# Patient Record
Sex: FEMALE | Race: WHITE | NOT HISPANIC OR LATINO
[De-identification: names, ages, dates, MRNs, and addresses within clinical notes are randomized per-mention and may not be internally consistent; named-entity substitution may affect disease eponyms.]

---

## 2017-08-09 ENCOUNTER — RX RENEWAL (OUTPATIENT)
Age: 72
End: 2017-08-09

## 2017-09-19 ENCOUNTER — RX RENEWAL (OUTPATIENT)
Age: 72
End: 2017-09-19

## 2017-11-01 ENCOUNTER — RESULT CHARGE (OUTPATIENT)
Age: 72
End: 2017-11-01

## 2017-11-02 ENCOUNTER — APPOINTMENT (OUTPATIENT)
Dept: HEART AND VASCULAR | Facility: CLINIC | Age: 72
End: 2017-11-02
Payer: MEDICARE

## 2017-11-02 VITALS
HEIGHT: 66 IN | DIASTOLIC BLOOD PRESSURE: 80 MMHG | WEIGHT: 142 LBS | OXYGEN SATURATION: 97 % | BODY MASS INDEX: 22.82 KG/M2 | HEART RATE: 87 BPM | TEMPERATURE: 98.7 F | SYSTOLIC BLOOD PRESSURE: 140 MMHG

## 2017-11-02 DIAGNOSIS — Z86.018 PERSONAL HISTORY OF OTHER BENIGN NEOPLASM: ICD-10-CM

## 2017-11-02 PROCEDURE — 99214 OFFICE O/P EST MOD 30 MIN: CPT | Mod: 25

## 2017-11-02 PROCEDURE — 93000 ELECTROCARDIOGRAM COMPLETE: CPT

## 2017-11-29 ENCOUNTER — RESULT REVIEW (OUTPATIENT)
Age: 72
End: 2017-11-29

## 2017-12-18 ENCOUNTER — APPOINTMENT (OUTPATIENT)
Dept: HEART AND VASCULAR | Facility: CLINIC | Age: 72
End: 2017-12-18
Payer: MEDICARE

## 2017-12-18 VITALS
BODY MASS INDEX: 23.14 KG/M2 | HEART RATE: 69 BPM | WEIGHT: 143.98 LBS | OXYGEN SATURATION: 96 % | DIASTOLIC BLOOD PRESSURE: 80 MMHG | HEIGHT: 66 IN | TEMPERATURE: 99.1 F | SYSTOLIC BLOOD PRESSURE: 132 MMHG

## 2017-12-18 PROCEDURE — 93000 ELECTROCARDIOGRAM COMPLETE: CPT

## 2017-12-18 PROCEDURE — 99214 OFFICE O/P EST MOD 30 MIN: CPT | Mod: 25

## 2018-02-06 ENCOUNTER — APPOINTMENT (OUTPATIENT)
Dept: HEART AND VASCULAR | Facility: CLINIC | Age: 73
End: 2018-02-06

## 2018-03-15 ENCOUNTER — OTHER (OUTPATIENT)
Age: 73
End: 2018-03-15

## 2018-03-21 ENCOUNTER — APPOINTMENT (OUTPATIENT)
Dept: HEART AND VASCULAR | Facility: CLINIC | Age: 73
End: 2018-03-21

## 2018-05-01 ENCOUNTER — APPOINTMENT (OUTPATIENT)
Dept: HEART AND VASCULAR | Facility: CLINIC | Age: 73
End: 2018-05-01
Payer: MEDICARE

## 2018-05-01 VITALS
HEART RATE: 56 BPM | BODY MASS INDEX: 22.66 KG/M2 | WEIGHT: 140.99 LBS | DIASTOLIC BLOOD PRESSURE: 81 MMHG | OXYGEN SATURATION: 97 % | SYSTOLIC BLOOD PRESSURE: 144 MMHG | TEMPERATURE: 97.5 F | HEIGHT: 66 IN

## 2018-05-01 DIAGNOSIS — R06.02 SHORTNESS OF BREATH: ICD-10-CM

## 2018-05-01 PROCEDURE — 99214 OFFICE O/P EST MOD 30 MIN: CPT | Mod: 25

## 2018-05-01 PROCEDURE — 93000 ELECTROCARDIOGRAM COMPLETE: CPT

## 2018-05-17 ENCOUNTER — OTHER (OUTPATIENT)
Age: 73
End: 2018-05-17

## 2018-05-22 ENCOUNTER — APPOINTMENT (OUTPATIENT)
Dept: HEART AND VASCULAR | Facility: CLINIC | Age: 73
End: 2018-05-22

## 2018-05-30 ENCOUNTER — LABORATORY RESULT (OUTPATIENT)
Age: 73
End: 2018-05-30

## 2018-05-30 ENCOUNTER — APPOINTMENT (OUTPATIENT)
Dept: HEART AND VASCULAR | Facility: CLINIC | Age: 73
End: 2018-05-30
Payer: MEDICARE

## 2018-05-30 PROCEDURE — 36415 COLL VENOUS BLD VENIPUNCTURE: CPT

## 2018-06-05 ENCOUNTER — RX RENEWAL (OUTPATIENT)
Age: 73
End: 2018-06-05

## 2018-06-25 ENCOUNTER — INPATIENT (INPATIENT)
Facility: HOSPITAL | Age: 73
LOS: 3 days | Discharge: HOME | DRG: 392 | End: 2018-06-29
Payer: MEDICARE

## 2018-06-25 VITALS
TEMPERATURE: 98 F | OXYGEN SATURATION: 95 % | HEART RATE: 89 BPM | HEIGHT: 66 IN | SYSTOLIC BLOOD PRESSURE: 150 MMHG | DIASTOLIC BLOOD PRESSURE: 91 MMHG | RESPIRATION RATE: 16 BRPM | WEIGHT: 134.92 LBS

## 2018-06-25 DIAGNOSIS — Z86.011 PERSONAL HISTORY OF BENIGN NEOPLASM OF THE BRAIN: Chronic | ICD-10-CM

## 2018-06-25 DIAGNOSIS — Z90.710 ACQUIRED ABSENCE OF BOTH CERVIX AND UTERUS: Chronic | ICD-10-CM

## 2018-06-25 LAB
ALBUMIN SERPL ELPH-MCNC: 4.5 G/DL — SIGNIFICANT CHANGE UP (ref 3.3–5)
ALP SERPL-CCNC: 101 U/L — SIGNIFICANT CHANGE UP (ref 40–120)
ALT FLD-CCNC: 20 U/L — SIGNIFICANT CHANGE UP (ref 10–45)
ANION GAP SERPL CALC-SCNC: 19 MMOL/L — HIGH (ref 5–17)
AST SERPL-CCNC: 26 U/L — SIGNIFICANT CHANGE UP (ref 10–40)
BASOPHILS NFR BLD AUTO: 0.1 % — SIGNIFICANT CHANGE UP (ref 0–2)
BILIRUB SERPL-MCNC: 1 MG/DL — SIGNIFICANT CHANGE UP (ref 0.2–1.2)
BUN SERPL-MCNC: 21 MG/DL — SIGNIFICANT CHANGE UP (ref 7–23)
CALCIUM SERPL-MCNC: 9.1 MG/DL — SIGNIFICANT CHANGE UP (ref 8.4–10.5)
CHLORIDE SERPL-SCNC: 99 MMOL/L — SIGNIFICANT CHANGE UP (ref 96–108)
CO2 SERPL-SCNC: 23 MMOL/L — SIGNIFICANT CHANGE UP (ref 22–31)
CREAT SERPL-MCNC: 0.71 MG/DL — SIGNIFICANT CHANGE UP (ref 0.5–1.3)
EOSINOPHIL NFR BLD AUTO: 0 % — SIGNIFICANT CHANGE UP (ref 0–6)
GLUCOSE SERPL-MCNC: 136 MG/DL — HIGH (ref 70–99)
HCT VFR BLD CALC: 43.2 % — SIGNIFICANT CHANGE UP (ref 34.5–45)
HGB BLD-MCNC: 15.1 G/DL — SIGNIFICANT CHANGE UP (ref 11.5–15.5)
LACTATE SERPL-SCNC: 2 MMOL/L — SIGNIFICANT CHANGE UP (ref 0.5–2)
LACTATE SERPL-SCNC: 3.7 MMOL/L — HIGH (ref 0.5–2)
LIDOCAIN IGE QN: 11 U/L — SIGNIFICANT CHANGE UP (ref 7–60)
LYMPHOCYTES # BLD AUTO: 6.7 % — LOW (ref 13–44)
MCHC RBC-ENTMCNC: 30.8 PG — SIGNIFICANT CHANGE UP (ref 27–34)
MCHC RBC-ENTMCNC: 35 G/DL — SIGNIFICANT CHANGE UP (ref 32–36)
MCV RBC AUTO: 88.2 FL — SIGNIFICANT CHANGE UP (ref 80–100)
MONOCYTES NFR BLD AUTO: 6.1 % — SIGNIFICANT CHANGE UP (ref 2–14)
NEUTROPHILS NFR BLD AUTO: 87.1 % — HIGH (ref 43–77)
PLATELET # BLD AUTO: 220 K/UL — SIGNIFICANT CHANGE UP (ref 150–400)
POTASSIUM SERPL-MCNC: 4.2 MMOL/L — SIGNIFICANT CHANGE UP (ref 3.5–5.3)
POTASSIUM SERPL-SCNC: 4.2 MMOL/L — SIGNIFICANT CHANGE UP (ref 3.5–5.3)
PROT SERPL-MCNC: 7.3 G/DL — SIGNIFICANT CHANGE UP (ref 6–8.3)
RBC # BLD: 4.9 M/UL — SIGNIFICANT CHANGE UP (ref 3.8–5.2)
RBC # FLD: 14.2 % — SIGNIFICANT CHANGE UP (ref 10.3–16.9)
SODIUM SERPL-SCNC: 141 MMOL/L — SIGNIFICANT CHANGE UP (ref 135–145)
WBC # BLD: 13.2 K/UL — HIGH (ref 3.8–10.5)
WBC # FLD AUTO: 13.2 K/UL — HIGH (ref 3.8–10.5)

## 2018-06-25 PROCEDURE — 99285 EMERGENCY DEPT VISIT HI MDM: CPT | Mod: 25

## 2018-06-25 PROCEDURE — 93010 ELECTROCARDIOGRAM REPORT: CPT

## 2018-06-25 PROCEDURE — 74177 CT ABD & PELVIS W/CONTRAST: CPT | Mod: 26

## 2018-06-25 PROCEDURE — 99223 1ST HOSP IP/OBS HIGH 75: CPT | Mod: GC

## 2018-06-25 RX ORDER — FAMOTIDINE 10 MG/ML
20 INJECTION INTRAVENOUS ONCE
Qty: 0 | Refills: 0 | Status: COMPLETED | OUTPATIENT
Start: 2018-06-25 | End: 2018-06-25

## 2018-06-25 RX ORDER — SODIUM CHLORIDE 9 MG/ML
1000 INJECTION INTRAMUSCULAR; INTRAVENOUS; SUBCUTANEOUS
Qty: 0 | Refills: 0 | Status: DISCONTINUED | OUTPATIENT
Start: 2018-06-25 | End: 2018-06-27

## 2018-06-25 RX ORDER — MORPHINE SULFATE 50 MG/1
4 CAPSULE, EXTENDED RELEASE ORAL ONCE
Qty: 0 | Refills: 0 | Status: DISCONTINUED | OUTPATIENT
Start: 2018-06-25 | End: 2018-06-25

## 2018-06-25 RX ORDER — PIPERACILLIN AND TAZOBACTAM 4; .5 G/20ML; G/20ML
3.38 INJECTION, POWDER, LYOPHILIZED, FOR SOLUTION INTRAVENOUS EVERY 6 HOURS
Qty: 0 | Refills: 0 | Status: DISCONTINUED | OUTPATIENT
Start: 2018-06-26 | End: 2018-06-26

## 2018-06-25 RX ORDER — SODIUM CHLORIDE 9 MG/ML
1000 INJECTION INTRAMUSCULAR; INTRAVENOUS; SUBCUTANEOUS ONCE
Qty: 0 | Refills: 0 | Status: COMPLETED | OUTPATIENT
Start: 2018-06-25 | End: 2018-06-25

## 2018-06-25 RX ORDER — HEPARIN SODIUM 5000 [USP'U]/ML
5000 INJECTION INTRAVENOUS; SUBCUTANEOUS EVERY 8 HOURS
Qty: 0 | Refills: 0 | Status: DISCONTINUED | OUTPATIENT
Start: 2018-06-25 | End: 2018-06-29

## 2018-06-25 RX ORDER — CIPROFLOXACIN LACTATE 400MG/40ML
400 VIAL (ML) INTRAVENOUS EVERY 12 HOURS
Qty: 0 | Refills: 0 | Status: DISCONTINUED | OUTPATIENT
Start: 2018-06-25 | End: 2018-06-25

## 2018-06-25 RX ORDER — LEVETIRACETAM 250 MG/1
500 TABLET, FILM COATED ORAL AT BEDTIME
Qty: 0 | Refills: 0 | Status: DISCONTINUED | OUTPATIENT
Start: 2018-06-25 | End: 2018-06-29

## 2018-06-25 RX ORDER — IOHEXOL 300 MG/ML
50 INJECTION, SOLUTION INTRAVENOUS ONCE
Qty: 0 | Refills: 0 | Status: COMPLETED | OUTPATIENT
Start: 2018-06-25 | End: 2018-06-25

## 2018-06-25 RX ORDER — ONDANSETRON 8 MG/1
4 TABLET, FILM COATED ORAL ONCE
Qty: 0 | Refills: 0 | Status: COMPLETED | OUTPATIENT
Start: 2018-06-25 | End: 2018-06-25

## 2018-06-25 RX ORDER — ATORVASTATIN CALCIUM 80 MG/1
0 TABLET, FILM COATED ORAL
Qty: 0 | Refills: 0 | COMMUNITY

## 2018-06-25 RX ORDER — METRONIDAZOLE 500 MG
500 TABLET ORAL EVERY 8 HOURS
Qty: 0 | Refills: 0 | Status: DISCONTINUED | OUTPATIENT
Start: 2018-06-25 | End: 2018-06-25

## 2018-06-25 RX ORDER — ATORVASTATIN CALCIUM 80 MG/1
40 TABLET, FILM COATED ORAL AT BEDTIME
Qty: 0 | Refills: 0 | Status: DISCONTINUED | OUTPATIENT
Start: 2018-06-25 | End: 2018-06-29

## 2018-06-25 RX ORDER — MORPHINE SULFATE 50 MG/1
2 CAPSULE, EXTENDED RELEASE ORAL EVERY 4 HOURS
Qty: 0 | Refills: 0 | Status: DISCONTINUED | OUTPATIENT
Start: 2018-06-25 | End: 2018-06-29

## 2018-06-25 RX ORDER — PIPERACILLIN AND TAZOBACTAM 4; .5 G/20ML; G/20ML
3.38 INJECTION, POWDER, LYOPHILIZED, FOR SOLUTION INTRAVENOUS ONCE
Qty: 0 | Refills: 0 | Status: COMPLETED | OUTPATIENT
Start: 2018-06-25 | End: 2018-06-25

## 2018-06-25 RX ORDER — ASPIRIN/CALCIUM CARB/MAGNESIUM 324 MG
81 TABLET ORAL DAILY
Qty: 0 | Refills: 0 | Status: DISCONTINUED | OUTPATIENT
Start: 2018-06-25 | End: 2018-06-29

## 2018-06-25 RX ADMIN — LEVETIRACETAM 500 MILLIGRAM(S): 250 TABLET, FILM COATED ORAL at 22:50

## 2018-06-25 RX ADMIN — PIPERACILLIN AND TAZOBACTAM 200 GRAM(S): 4; .5 INJECTION, POWDER, LYOPHILIZED, FOR SOLUTION INTRAVENOUS at 17:33

## 2018-06-25 RX ADMIN — IOHEXOL 50 MILLILITER(S): 300 INJECTION, SOLUTION INTRAVENOUS at 12:48

## 2018-06-25 RX ADMIN — MORPHINE SULFATE 4 MILLIGRAM(S): 50 CAPSULE, EXTENDED RELEASE ORAL at 17:33

## 2018-06-25 RX ADMIN — ONDANSETRON 4 MILLIGRAM(S): 8 TABLET, FILM COATED ORAL at 12:48

## 2018-06-25 RX ADMIN — HEPARIN SODIUM 5000 UNIT(S): 5000 INJECTION INTRAVENOUS; SUBCUTANEOUS at 22:51

## 2018-06-25 RX ADMIN — ATORVASTATIN CALCIUM 40 MILLIGRAM(S): 80 TABLET, FILM COATED ORAL at 22:51

## 2018-06-25 RX ADMIN — SODIUM CHLORIDE 2000 MILLILITER(S): 9 INJECTION INTRAMUSCULAR; INTRAVENOUS; SUBCUTANEOUS at 17:33

## 2018-06-25 RX ADMIN — FAMOTIDINE 20 MILLIGRAM(S): 10 INJECTION INTRAVENOUS at 12:47

## 2018-06-25 RX ADMIN — SODIUM CHLORIDE 2000 MILLILITER(S): 9 INJECTION INTRAMUSCULAR; INTRAVENOUS; SUBCUTANEOUS at 15:03

## 2018-06-25 RX ADMIN — SODIUM CHLORIDE 2000 MILLILITER(S): 9 INJECTION INTRAMUSCULAR; INTRAVENOUS; SUBCUTANEOUS at 12:47

## 2018-06-25 NOTE — ED PROVIDER NOTE - PHYSICAL EXAMINATION
CONSTITUTIONAL: pale appearing, sitting in chair, NAD   HEAD: Normocephalic; atraumatic.   EYES: PERRL; EOM intact; conjunctiva and sclera clear  ENT: normal nose; no rhinorrhea; normal pharynx with no erythema or lesions.   NECK: Supple; non-tender; no LAD  CARDIOVASCULAR: Normal S1, S2; no murmurs, rubs, or gallops. Regular rate and rhythm.   RESPIRATORY: Breathing easily; breath sounds clear and equal bilaterally; no wheezes, rhonchi, or rales.  GI: Soft; non-distended; non-tender; no palpable organomegaly. +hyperactive BS  MSK: FROM at all extremities, normal tone   EXT: No cyanosis or edema; N/V intact  SKIN: Normal for age and race; warm; dry; good turgor; no apparent lesions or rash.   NEURO: A & O x 3; face symmetric; grossly unremarkable.   PSYCHOLOGICAL: The patient’s mood and manner are appropriate.

## 2018-06-25 NOTE — H&P ADULT - ASSESSMENT
73Fpmhx of meningi[age], HLD, CAD s/p stent in 2003, and recent treatment for Lyme last month presenting for profuse watery diarrhea, vomiting and abdominal pain.

## 2018-06-25 NOTE — H&P ADULT - HISTORY OF PRESENT ILLNESS
73 F pmhx of HLD, lyme disease many years ago, meningioma s/p resection in August 2017 and CAD s/p stent in 2003 presents after being sent in from her PMD's office for dehydration from profuse watery diarrhea and vomiting lasting approx 12 hours.  She said last night she ate an old ice cream bar and 1 hour later developed sharp, crampy abdominal pain worse on the left lower quadrant that was persistent but intermittently intensified.  She is not sure if it was related to food ingestion as she has not eaten since that time but did note increased flatus.  The diarrhea was watery, non bloody or mucusy.  She has a "sensitive stomach" and says that she has a history of parasitic infectious diarrhea in the past but does not know other info about that diagnosis.  She has no recent travel, did use Doxycycline for 21 days last month after a tick bite.   She has subjective fevers at home over the past 5 days before the diarrhea started.  No other symptoms.       In ED:  T 98.2 HR 69 /93 RR 16 O2 95% on RA. She was given Zosyn, famotidine, 3L NS, and morphine with significant symptomatic improvment.

## 2018-06-25 NOTE — ED PROVIDER NOTE - PROGRESS NOTE DETAILS
spoke with Dr. Quresih, recommended fluids, labs, ?CT, Dr. Murphy aware of pt CT concerning for ischemic colitis. Pt now c/o worsening LLQ pain. Lactate 3.7 after 2L. Pt meets sepsis criteria, will get blood cultures, start abx. EKG, surgery, vascular consulted. Will get CTA abd/pelvis As per surgery resident, pt may have ischemic colitis, recommend IV fluids, hydration. Stool studies for O&P. Plan for fluids, lactate trend, continue abx. IV Zosyn already given. spoke with Dr. Murphy who spoke with Dr. velazquez, does not need CTA. Will admit to [facility], IV hydration, IV abx

## 2018-06-25 NOTE — H&P ADULT - PROBLEM SELECTOR PLAN 5
Continue ASA, last stent in 2003 however pt has strong family history of young cardiac death.   EKG shows NSR w/o ischemic changes but prolonged QtC 502 so avoid QT prolonging medications. Continue ASA, last stent in 2003 however pt has strong family history of young cardiac death.   EKG shows NSR w/o ischemic changes but prolonged QtC 502 so avoid QT prolonging medications.  Continue Atorvastatin QHS

## 2018-06-25 NOTE — CONSULT NOTE ADULT - ATTENDING COMMENTS
CT most consistent with ischemic colitis. Does not recall having bloody diarrhea.  Bowel rest  IV antibiotics  stool cultures  No role for CT angiogram.

## 2018-06-25 NOTE — ED PROVIDER NOTE - MEDICAL DECISION MAKING DETAILS
74 yo F with pmh of a meningioma resected 1 year ago, abd surgery for "twisted colon," appendectomy, EVERARDO, CAD s/p 1 stent c/o lower abd pain n/v/d since 9pm last night. Pt reports eating ice cream sandwiches from the freezer at work around 8pm and believes they may have been old. Admits to multiple nbnb vomiting all throughout the night and watery, non-bloody diarrhea. No f/c. Pt reports shaking during vomiting. No LOC, no HA, no incontinence. Pt pale, NAD. Abd soft, nt, nd, +hyperactive BS. Neurologically intact. Unlikely seizure episode. Likely food borne illness vs viral gastroenteritis. Will hydrate, get CT to r/o colitis.

## 2018-06-25 NOTE — ED PROVIDER NOTE - OBJECTIVE STATEMENT
72 yo F with pmh of a meningioma resected 1 year ago, abd surgery for "twisted colon," appendectomy, EVERARDO, CAD s/p 1 stent c/o lower abd pain n/v/d since 9pm last night. Pt reports eating ice cream sandwiches from the freezer at work around 8pm and believes they may have been old. Admits to multiple nbnb vomiting all throughout the night and watery, non-bloody diarrhea. Pt states last episode of vomiting and diarrhea were a few hours ago. Pt now feels generalized weakness and fatigue. Pt states while she was vomiting she had an episode of shaking  and was afraid it may have been a seizure. Pt has never had a seizure before and takes keppra for seizure prophylaxis. Pt states she was conscious during the entire episode and denies incontinence. Denies fever, chills, sick contacts, recent travel, abx use, dysuria, back pain, cp, sob, HA.

## 2018-06-25 NOTE — H&P ADULT - PROBLEM SELECTOR PLAN 1
Ischemic vs. infectious  most likely given history of diarrhea starting 1 hour after ingesting old dairy product.  She also could have ischemic colitis and was evaluated by surgery in ED who do not recommend a CTA at this time.  Will continue antibiotics.  Avoiding Cipro for Qtc >500.  Zosyn 3.375 Q8hrs  Check stool osm, na and K for gap  If diarrhea persists (now no diarrhea all afternoon) would check Cdiff as she was on abx for a tick bite last month (Doxycycline 21 day course).   If found to be ischemic colitis will need GI eval for scope.  Surgery Team1 following appreciate recs.   Continue hydration NS @ 125mls/hr    There is circumferential colonic   wall thickening with colonic wall edema and mild surrounding inflammatory   changes extending from the distal splenic flexure to the mid sigmoid   colon . In addition, in the distal sigmoid there is suggestion of a 1.4   cm enhancing nodule or mass within its wall versus an inflamed   diverticulum.  Given CT finding of mass vs. diverticulitis pt should have repeat imaging or colonoscopy after resolution of acute illness. Ischemic vs. infectious  most likely given history of diarrhea starting 1 hour after ingesting old dairy product.  She also could have ischemic colitis and was evaluated by surgery in ED who do not recommend a CTA at this time.  Will continue antibiotics.  Avoiding Cipro for Qtc >500.  Zosyn 3.375 Q8hrs  Check stool osm, na and K for gap- If OSM <250 consider excessive laxitive use and calculate osm gap  If diarrhea persists (now no diarrhea all afternoon) would check Cdiff as she was on abx for a tick bite last month (Doxycycline 21 day course).   If found to be ischemic colitis will need GI eval for scope.  Surgery Team1 following appreciate recs.   Continue hydration NS @ 125mls/hr    There is circumferential colonic   wall thickening with colonic wall edema and mild surrounding inflammatory   changes extending from the distal splenic flexure to the mid sigmoid   colon . In addition, in the distal sigmoid there is suggestion of a 1.4   cm enhancing nodule or mass within its wall versus an inflamed   diverticulum.  Given CT finding of mass vs. diverticulitis pt should have repeat imaging or colonoscopy after resolution of acute illness.  Pain control with Morphine 2mg IV Q4 prn severe pain.

## 2018-06-25 NOTE — ED ADULT NURSE NOTE - OBJECTIVE STATEMENT
Pt came to ED complaining of lower abd pain since yesterday evening with N/V/D.  Pt denies chest pain, SOB,  symptoms, fever or chills. Alert and oriented x3. POsitive PMS x4 extremities.  Ambulatory with even gait.

## 2018-06-25 NOTE — CONSULT NOTE ADULT - ASSESSMENT
73F w/ h/o HLD, CAD s/p stent, meningioma s/p resection 8/2017, endometriosis s/p EVERARDO BSO, & h/o colonic volvulus s/p ex lap, colopexy & appendectomy who now presents to [facility] ED 6/25/18 w/ N/V/D & lower abd pain x 24h w/ CT A/P findings c/w colitis (ischemic vs. infectious vs. inflammatory).    -Given CT A/P findings of colitis in descending to sigmoid colon in setting of dehydration due to profuse vomiting/diarrhea, suspect possible ischemic colitis.   -No urgent surgical intervention indicated at this time.  -No need for CTA A/P as it is unlikely to alter management at this point.    -Continue IVF rehydration.   -Continue IV antibiotics.  -Recommend stool studies including ova & parasites.  -Recommend colonoscopy when symptoms resolve.  -General Surgery Team 1 to follow.  -Discussed w/ chief resident & attending. 73F w/ h/o HLD, CAD s/p stent, meningioma s/p resection 8/2017, endometriosis s/p EVERARDO BSO, & h/o colonic volvulus s/p ex lap, colopexy & appendectomy who now presents to [facility] ED 6/25/18 w/ N/V/D & lower abd pain x 24h w/ CT A/P findings c/w colitis (ischemic vs. infectious vs. inflammatory).    -Given CT A/P findings of colitis in descending to sigmoid colon in setting of dehydration due to profuse vomiting/diarrhea, suspect possible ischemic colitis.   -No urgent surgical intervention indicated at this time.  -No need for CTA A/P as it is unlikely to alter management at this point.    -Continue IVF rehydration. Trend lactate.   -Continue IV antibiotics.  -Recommend stool studies including ova & parasites.  -Recommend colonoscopy when symptoms resolve.  -General Surgery Team 1 to follow.  -Discussed w/ chief resident & attending.

## 2018-06-25 NOTE — H&P ADULT - PROBLEM SELECTOR PLAN 2
Pt has lactate 3.4 on admission after 2L NS now downtrended to 2.  Continue hydration and monitor for severe abdominal pain from ischemic colitis.  Only meets 1/4 SIRs so not sepsis but likely hypoperfusion from hypovolemia with profuse diarrhea now resolving.   Repeat BMP in am

## 2018-06-25 NOTE — H&P ADULT - NSHPREVIEWOFSYSTEMS_GEN_ALL_CORE
+ diarrhea, subjective fevers, abdominal pain, nausea and vomiting  - weight changes, cough, dysuria

## 2018-06-25 NOTE — ED PROVIDER NOTE - ATTENDING CONTRIBUTION TO CARE
73 F with hx of prior volvulus in past now with N/V/D x 1 day min LLQ [pain CT  colitis -splenic flexure to sigmoid noted elevated lactate 3.7 s/p 2 L - abd soft NT  no guarding - Vasc surgery saw pt  will obtain CTA abd pelvis  if neg will admit to [facility]-for hydration  IV  zosyn ordered- will cont hydration

## 2018-06-25 NOTE — CONSULT NOTE ADULT - SUBJECTIVE AND OBJECTIVE BOX
GENERAL SURGERY CONSULT NOTE    HPI: 73F w/ h/o HLD, CAD s/p stent, meningioma s/p resection 8/2017, endometriosis s/p EVERARDO BSO, & h/o colonic volvulus s/p ex lap, colopexy & appendectomy who now presents to [facility] ED 6/25/18 w/ 24hrs of nausea/vomiting & diarrhea (non-bloody) assoc w/ lower abdominal pain (LLQ>RLQ). Also c/o subjective fevers & rigors. In the ED, pt remains afebrile & hemodynamically stable w/ WBC 13.2, PMNs 87%, & LA 3.7. CT A/P showed PO contrast to prox ascending colon--circumferential colonic wall thickening w/ colonic wall edema & mild surrounding inflammatory changes extending from distal splenic flexure to mid sigmoid colon, as well as distal sigmoid 1.4cm enhancing nodule or mass in wall vs. inflamed diverticulum. Pt reports h/o normal colonoscopy 3-4 yrs ago. Also report h/o multiple GI parasites however cannot recall diagnoses or specific treatments.     PMHx: HLD, CAD s/p stent, meningioma, endometriosis, lyme disease, GI parasites    PSHx: meningioma resection 8/2017, EVERARDO BSO for endometriosis in her 20s, ex lap w/ colopexy & open appendectomy for colonic volvulus     ROS: Pertinent positives/negatives noted in HPI.     HOME MEDS: aspirin 81 TIW, Lipitor, Keppra    ALLERGIES: NKDA    SocHx: Never smoker. No EtOH or drug use.     FHx: No pertinent history in first degree relatives.      T(C): 36.9 (06-25-18 @ 15:20), Max: 36.9 (06-25-18 @ 15:20)  HR: 69 (06-25-18 @ 15:20) (69 - 89)  BP: 164/93 (06-25-18 @ 15:20) (150/91 - 164/93)  RR: 16 (06-25-18 @ 15:20) (16 - 16)  SpO2: 95% (06-25-18 @ 15:20) (95% - 95%)    PHYSICAL EXAM:  GEN: Non-toxic   NEURO: A&Ox3, NAD, FALCON.  CV: RRR, S1&S2.   PULM: CTAB, no increased WOB.  ABD: Soft, mildly distended, minimally tender to palpation in lower quadrants (has received morphine however), no rebound or guarding.  EXTR: WWP. No peripheral edema.    LABS:                        15.1   13.2  )-----------( 220      ( 25 Jun 2018 12:51 )             43.2     06-25    141  |  99  |  21  ----------------------------<  136<H>  4.2   |  23  |  0.71    Ca    9.1      25 Jun 2018 12:51    TPro  7.3  /  Alb  4.5  /  TBili  1.0  /  DBili  x   /  AST  26  /  ALT  20  /  AlkPhos  101  06-25      CT Abdomen and Pelvis w/ Oral Cont and w/ IV Cont (06.25.18 @ 14:48) >    FINDINGS: Images of the lower chest demonstrate linear atelectasis in the   left lower lobe. Mild interlobular septal thickening likely representing   mild pulmonary venous congestion. Coronary artery calcifications.    Hepatic steatosis. Subcentimeter hepatic hypodensities, too small to   further characterize. No radiopaque stones are seen in the gallbladder.    The pancreas is normal in appearance.  No splenic abnormalities are seen.    The adrenal glands are unremarkable. 7 mm nonobstructing left upper pole   renal calculus. The kidneys are normal in appearance.        No abdominal aortic aneurysmis seen. Moderate atherosclerotic   calcifications of the abdominal aorta, bilateral iliac and femoral   arteries and their respective branches. Circumaortic left renal vein,   normal variant. The retroaortic component of the circumaortic left renal   vein enters the left kidney through its lower pole. No lymphadenopathy is   seen.     Evaluation of the bowel demonstrates oral contrast up to the proximal   ascending colon. Colonic diverticulosis. There is circumferential colonic   wall thickening with colonic wall edema and mild surrounding inflammatory   changes extending from the distal splenic flexure to the mid sigmoid   colon . In addition, in the distal sigmoid there is suggestion of a 1.4   cm enhancing nodule or mass within its wall versus an inflamed   diverticulum. No bowel obstruction or free intraperitoneal air. Normal   appendix. Diastases recti. No ascites is seen.    Images of the pelvis demonstrate centimeters uterus compatible with   hysterectomy. No adnexal masses. Unremarkable appearance of urinary   bladder. No pelvic adenopathy.    Evaluation of the osseous structures demonstrates severe degenerative   changes of the spine. Multilevel lumbar disc bulges. Severe degenerative   change of the right hip. Moderate degenerative change of the left hip.   Patchy osteopenia.    IMPRESSION:  Nonspecific colitis from the distal splenic flexure to the sigmoid colon   which is a distribution that can be seen with ischemic colitis, but the   differential diagnosis includes infectious or inflammatory colitis.   Correlation is advised.

## 2018-06-25 NOTE — H&P ADULT - FAMILY HISTORY
Mother  Still living? Unknown  Family history of chronic ischemic heart disease, Age at diagnosis: Age Unknown     Sibling  Still living? Unknown  Family history of chronic ischemic heart disease, Age at diagnosis: Age Unknown

## 2018-06-25 NOTE — H&P ADULT - NSHPLABSRESULTS_GEN_ALL_CORE
06-25    141  |  99  |  21  ----------------------------<  136<H>  4.2   |  23  |  0.71    Ca    9.1      25 Jun 2018 12:51    TPro  7.3  /  Alb  4.5  /  TBili  1.0  /  DBili  x   /  AST  26  /  ALT  20  /  AlkPhos  101  06-25                            15.1   13.2  )-----------( 220      ( 25 Jun 2018 12:51 )             43.2             LIVER FUNCTIONS - ( 25 Jun 2018 12:51 )  Alb: 4.5 g/dL / Pro: 7.3 g/dL / ALK PHOS: 101 U/L / ALT: 20 U/L / AST: 26 U/L / GGT: x                   CT Abd/pelvis:        INTERPRETATION:  CT of the abdomen and pelvis with contrast dated   6/25/2018 2:48 PM    INDICATION: abd pain n/v/d .    TECHNIQUE: CT of the abdomen and pelvis was performed using oral and   intravenous contrast.  Axial and coronal images were produced and   reviewed.    PRIOR STUDIES: None.    FINDINGS: Images of the lower chest demonstrate linear atelectasis in the   left lower lobe. Mild interlobular septal thickening likely representing   mild pulmonary venous congestion. Coronary artery calcifications.    Hepatic steatosis. Subcentimeter hepatic hypodensities, too small to   further characterize. No radiopaque stones are seen in the gallbladder.    The pancreas is normal in appearance.  No splenic abnormalities are seen.    The adrenal glands are unremarkable. 7 mm nonobstructing left upper pole   renal calculus. The kidneys are normal in appearance.        No abdominal aortic aneurysm is seen. Moderate atherosclerotic   calcifications of the abdominal aorta, bilateral iliac and femoral   arteries and their respective branches. Circumaortic left renal vein,   normal variant. The retroaortic component of the circumaortic left renal   vein enters the left kidney through its lower pole. No lymphadenopathy is   seen.     Evaluation of the bowel demonstrates oral contrast up to the proximal   ascending colon. Colonic diverticulosis. There is circumferential colonic   wall thickening with colonic wall edema and mild surrounding inflammatory   changes extending from the distal splenic flexure to the mid sigmoid   colon . In addition, in the distal sigmoid there is suggestion of a 1.4   cm enhancing nodule or mass within its wall versus an inflamed   diverticulum. No bowel obstruction or free intraperitoneal air. Normal   appendix. Diastases recti. No ascites is seen.    Images of the pelvis demonstrate centimeters uterus compatible with   hysterectomy. No adnexal masses. Unremarkable appearance of urinary   bladder. No pelvic adenopathy.    Evaluation of the osseous structures demonstrates severe degenerative   changes of the spine. Multilevel lumbar disc bulges. Severe degenerative   change of the right hip. Moderate degenerative change of the left hip.   Patchy osteopenia.    IMPRESSION:  Nonspecific colitis from the distal splenic flexure to the sigmoid colon   which is a distribution that can be seen with ischemic colitis, but the   differential diagnosis includes infectious or inflammatory colitis.   Correlation is advised.

## 2018-06-25 NOTE — H&P ADULT - NSHPPHYSICALEXAM_GEN_ALL_CORE
ICU Vital Signs Last 24 Hrs  T(C): 36.9 (25 Jun 2018 21:33), Max: 36.9 (25 Jun 2018 15:20)  T(F): 98.4 (25 Jun 2018 21:33), Max: 98.4 (25 Jun 2018 15:20)  HR: 77 (25 Jun 2018 21:33) (69 - 89)  BP: 155/78 (25 Jun 2018 21:33) (150/91 - 164/93)  BP(mean): --  ABP: --  ABP(mean): --  RR: 16 (25 Jun 2018 21:33) (16 - 16)  SpO2: 100% (25 Jun 2018 21:33) (95% - 100%)    General: NAD, comfortable, dry appearing  HEENT: dry mmm, no jvd or lymphadenopathy  Cardio: RRR no murmurs  Lung; CTAB  Abd: Soft, TTP of LLQ w/o reboud but some guarding noted.  Hypoactive bowel sounds. Scar midline and also in RLQ  Ext: WWP no edema

## 2018-06-26 DIAGNOSIS — R63.8 OTHER SYMPTOMS AND SIGNS CONCERNING FOOD AND FLUID INTAKE: ICD-10-CM

## 2018-06-26 DIAGNOSIS — R94.31 ABNORMAL ELECTROCARDIOGRAM [ECG] [EKG]: ICD-10-CM

## 2018-06-26 DIAGNOSIS — K52.9 NONINFECTIVE GASTROENTERITIS AND COLITIS, UNSPECIFIED: ICD-10-CM

## 2018-06-26 DIAGNOSIS — Z29.9 ENCOUNTER FOR PROPHYLACTIC MEASURES, UNSPECIFIED: ICD-10-CM

## 2018-06-26 DIAGNOSIS — D32.9 BENIGN NEOPLASM OF MENINGES, UNSPECIFIED: ICD-10-CM

## 2018-06-26 DIAGNOSIS — I25.10 ATHEROSCLEROTIC HEART DISEASE OF NATIVE CORONARY ARTERY WITHOUT ANGINA PECTORIS: ICD-10-CM

## 2018-06-26 DIAGNOSIS — E87.2 ACIDOSIS: ICD-10-CM

## 2018-06-26 LAB
ALBUMIN SERPL ELPH-MCNC: 3.2 G/DL — LOW (ref 3.3–5)
ALP SERPL-CCNC: 67 U/L — SIGNIFICANT CHANGE UP (ref 40–120)
ALT FLD-CCNC: 12 U/L — SIGNIFICANT CHANGE UP (ref 10–45)
ANION GAP SERPL CALC-SCNC: 10 MMOL/L — SIGNIFICANT CHANGE UP (ref 5–17)
AST SERPL-CCNC: 15 U/L — SIGNIFICANT CHANGE UP (ref 10–40)
BILIRUB SERPL-MCNC: 1.5 MG/DL — HIGH (ref 0.2–1.2)
BUN SERPL-MCNC: 14 MG/DL — SIGNIFICANT CHANGE UP (ref 7–23)
CALCIUM SERPL-MCNC: 8.1 MG/DL — LOW (ref 8.4–10.5)
CHLORIDE SERPL-SCNC: 103 MMOL/L — SIGNIFICANT CHANGE UP (ref 96–108)
CO2 SERPL-SCNC: 25 MMOL/L — SIGNIFICANT CHANGE UP (ref 22–31)
CREAT SERPL-MCNC: 0.85 MG/DL — SIGNIFICANT CHANGE UP (ref 0.5–1.3)
GLUCOSE SERPL-MCNC: 121 MG/DL — HIGH (ref 70–99)
HCT VFR BLD CALC: 34.9 % — SIGNIFICANT CHANGE UP (ref 34.5–45)
HGB BLD-MCNC: 11.9 G/DL — SIGNIFICANT CHANGE UP (ref 11.5–15.5)
MAGNESIUM SERPL-MCNC: 2.2 MG/DL — SIGNIFICANT CHANGE UP (ref 1.6–2.6)
MCHC RBC-ENTMCNC: 30.9 PG — SIGNIFICANT CHANGE UP (ref 27–34)
MCHC RBC-ENTMCNC: 34.1 G/DL — SIGNIFICANT CHANGE UP (ref 32–36)
MCV RBC AUTO: 90.6 FL — SIGNIFICANT CHANGE UP (ref 80–100)
PHOSPHATE SERPL-MCNC: 2.7 MG/DL — SIGNIFICANT CHANGE UP (ref 2.5–4.5)
PLATELET # BLD AUTO: 175 K/UL — SIGNIFICANT CHANGE UP (ref 150–400)
POTASSIUM SERPL-MCNC: 3.6 MMOL/L — SIGNIFICANT CHANGE UP (ref 3.5–5.3)
POTASSIUM SERPL-SCNC: 3.6 MMOL/L — SIGNIFICANT CHANGE UP (ref 3.5–5.3)
PROT SERPL-MCNC: 5.7 G/DL — LOW (ref 6–8.3)
RBC # BLD: 3.85 M/UL — SIGNIFICANT CHANGE UP (ref 3.8–5.2)
RBC # FLD: 14.1 % — SIGNIFICANT CHANGE UP (ref 10.3–16.9)
SODIUM SERPL-SCNC: 138 MMOL/L — SIGNIFICANT CHANGE UP (ref 135–145)
WBC # BLD: 9.2 K/UL — SIGNIFICANT CHANGE UP (ref 3.8–10.5)
WBC # FLD AUTO: 9.2 K/UL — SIGNIFICANT CHANGE UP (ref 3.8–10.5)

## 2018-06-26 PROCEDURE — 99232 SBSQ HOSP IP/OBS MODERATE 35: CPT | Mod: GC

## 2018-06-26 PROCEDURE — 71045 X-RAY EXAM CHEST 1 VIEW: CPT | Mod: 26

## 2018-06-26 PROCEDURE — 93010 ELECTROCARDIOGRAM REPORT: CPT

## 2018-06-26 RX ORDER — METRONIDAZOLE 500 MG
500 TABLET ORAL EVERY 8 HOURS
Qty: 0 | Refills: 0 | Status: DISCONTINUED | OUTPATIENT
Start: 2018-06-26 | End: 2018-06-29

## 2018-06-26 RX ORDER — ACETAMINOPHEN 500 MG
650 TABLET ORAL EVERY 6 HOURS
Qty: 0 | Refills: 0 | Status: DISCONTINUED | OUTPATIENT
Start: 2018-06-26 | End: 2018-06-29

## 2018-06-26 RX ORDER — SODIUM CHLORIDE 9 MG/ML
1000 INJECTION INTRAMUSCULAR; INTRAVENOUS; SUBCUTANEOUS ONCE
Qty: 0 | Refills: 0 | Status: COMPLETED | OUTPATIENT
Start: 2018-06-26 | End: 2018-06-26

## 2018-06-26 RX ORDER — CEFTRIAXONE 500 MG/1
1 INJECTION, POWDER, FOR SOLUTION INTRAMUSCULAR; INTRAVENOUS EVERY 24 HOURS
Qty: 0 | Refills: 0 | Status: DISCONTINUED | OUTPATIENT
Start: 2018-06-26 | End: 2018-06-29

## 2018-06-26 RX ORDER — SIMETHICONE 80 MG/1
80 TABLET, CHEWABLE ORAL ONCE
Qty: 0 | Refills: 0 | Status: COMPLETED | OUTPATIENT
Start: 2018-06-26 | End: 2018-06-27

## 2018-06-26 RX ADMIN — HEPARIN SODIUM 5000 UNIT(S): 5000 INJECTION INTRAVENOUS; SUBCUTANEOUS at 22:29

## 2018-06-26 RX ADMIN — LEVETIRACETAM 500 MILLIGRAM(S): 250 TABLET, FILM COATED ORAL at 22:28

## 2018-06-26 RX ADMIN — PIPERACILLIN AND TAZOBACTAM 200 GRAM(S): 4; .5 INJECTION, POWDER, LYOPHILIZED, FOR SOLUTION INTRAVENOUS at 01:45

## 2018-06-26 RX ADMIN — CEFTRIAXONE 100 GRAM(S): 500 INJECTION, POWDER, FOR SOLUTION INTRAMUSCULAR; INTRAVENOUS at 14:27

## 2018-06-26 RX ADMIN — SODIUM CHLORIDE 125 MILLILITER(S): 9 INJECTION INTRAMUSCULAR; INTRAVENOUS; SUBCUTANEOUS at 01:45

## 2018-06-26 RX ADMIN — SODIUM CHLORIDE 4000 MILLILITER(S): 9 INJECTION INTRAMUSCULAR; INTRAVENOUS; SUBCUTANEOUS at 16:43

## 2018-06-26 RX ADMIN — Medication 100 MILLIGRAM(S): at 22:29

## 2018-06-26 RX ADMIN — HEPARIN SODIUM 5000 UNIT(S): 5000 INJECTION INTRAVENOUS; SUBCUTANEOUS at 07:02

## 2018-06-26 RX ADMIN — PIPERACILLIN AND TAZOBACTAM 200 GRAM(S): 4; .5 INJECTION, POWDER, LYOPHILIZED, FOR SOLUTION INTRAVENOUS at 07:02

## 2018-06-26 RX ADMIN — MORPHINE SULFATE 2 MILLIGRAM(S): 50 CAPSULE, EXTENDED RELEASE ORAL at 01:52

## 2018-06-26 RX ADMIN — MORPHINE SULFATE 2 MILLIGRAM(S): 50 CAPSULE, EXTENDED RELEASE ORAL at 12:18

## 2018-06-26 RX ADMIN — Medication 650 MILLIGRAM(S): at 16:46

## 2018-06-26 RX ADMIN — HEPARIN SODIUM 5000 UNIT(S): 5000 INJECTION INTRAVENOUS; SUBCUTANEOUS at 14:27

## 2018-06-26 RX ADMIN — MORPHINE SULFATE 2 MILLIGRAM(S): 50 CAPSULE, EXTENDED RELEASE ORAL at 12:01

## 2018-06-26 RX ADMIN — Medication 100 MILLIGRAM(S): at 14:45

## 2018-06-26 RX ADMIN — MORPHINE SULFATE 2 MILLIGRAM(S): 50 CAPSULE, EXTENDED RELEASE ORAL at 02:07

## 2018-06-26 RX ADMIN — Medication 81 MILLIGRAM(S): at 12:01

## 2018-06-26 RX ADMIN — SODIUM CHLORIDE 125 MILLILITER(S): 9 INJECTION INTRAMUSCULAR; INTRAVENOUS; SUBCUTANEOUS at 12:01

## 2018-06-26 RX ADMIN — ATORVASTATIN CALCIUM 40 MILLIGRAM(S): 80 TABLET, FILM COATED ORAL at 22:29

## 2018-06-26 NOTE — PROGRESS NOTE ADULT - PROBLEM SELECTOR PLAN 4
Qtc 502, avoid Zofran and Cipro for now until resolved  Check EKG in am  Replete lytes. -Qtc 502 on admission EKG, avoid QTc prolonging medications.   -Follow up repeat EKG

## 2018-06-26 NOTE — PROGRESS NOTE ADULT - SUBJECTIVE AND OBJECTIVE BOX
Chief Complaint:   74 yo F w/ pmhx of meningioma, HLD, CAD s/p stent in 2003, and recent treatment for Lyme last month presenting with complaints of profuse watery diarrhea, vomiting and abdominal pain admitted to [facility] for further management.     OVERNIGHT EVENTS: NAEO.    SUBJECTIVE / INTERVAL HPI: Patient seen and examined at bedside. NAD. Patient denies any episode of vomiting or diarrhea overnight, she states her nausea has dramatically improved. She is still experiencing diffuse abdominal pain worse in LLQ which has improved since admission. All remaining ROS negative.     VITAL SIGNS:  Vital Signs Last 24 Hrs  T(C): 36.8 (26 Jun 2018 09:20), Max: 37.4 (26 Jun 2018 04:56)  T(F): 98.3 (26 Jun 2018 09:20), Max: 99.4 (26 Jun 2018 04:56)  HR: 72 (26 Jun 2018 09:20) (64 - 77)  BP: 122/73 (26 Jun 2018 09:20) (122/73 - 164/93)  BP(mean): --  RR: 16 (26 Jun 2018 09:20) (16 - 18)  SpO2: 93% (26 Jun 2018 09:20) (93% - 100%)    PHYSICAL EXAM:  General: NAD  HEENT: NC/AT; PERRL, anicteric sclera; MMM  Neck: supple, no LAD, no JVD  Cardiovascular: +S1/S2; RRR  Respiratory: CTA B/L; no W/R/R  Gastrointestinal: soft, TTP in LLQ. bowel sounds positive.   Extremities: WWP; no edema, clubbing or cyanosis  Vascular: 2+ radial, DP/PT pulses B/L  Neurological: AAOx3; no focal deficits    MEDICATIONS:  MEDICATIONS  (STANDING):  aspirin enteric coated 81 milliGRAM(s) Oral daily  atorvastatin 40 milliGRAM(s) Oral at bedtime  cefTRIAXone   IVPB 1 Gram(s) IV Intermittent every 24 hours  heparin  Injectable 5000 Unit(s) SubCutaneous every 8 hours  levETIRAcetam 500 milliGRAM(s) Oral at bedtime  metroNIDAZOLE  IVPB 500 milliGRAM(s) IV Intermittent every 8 hours  sodium chloride 0.9%. 1000 milliLiter(s) (125 mL/Hr) IV Continuous <Continuous>    MEDICATIONS  (PRN):  morphine  - Injectable 2 milliGRAM(s) IV Push every 4 hours PRN Moderate Pain (4 - 6)      ALLERGIES:  Allergies  No Known Allergies  Intolerances    LABS:                        11.9   9.2   )-----------( 175      ( 26 Jun 2018 05:31 )             34.9     06-26    138  |  103  |  14  ----------------------------<  121<H>  3.6   |  25  |  0.85    Ca    8.1<L>      26 Jun 2018 05:31  Phos  2.7     06-26  Mg     2.2     06-26    TPro  5.7<L>  /  Alb  3.2<L>  /  TBili  1.5<H>  /  DBili  x   /  AST  15  /  ALT  12  /  AlkPhos  67  06-26      CAPILLARY BLOOD GLUCOSE    RADIOLOGY & ADDITIONAL TESTS: Reviewed.

## 2018-06-26 NOTE — PROGRESS NOTE ADULT - PROBLEM SELECTOR PLAN 2
Pt has lactate 3.4 on admission after 2L NS now downtrended to 2.  Continue hydration and monitor for severe abdominal pain from ischemic colitis.  Only meets 1/4 SIRs so not sepsis but likely hypoperfusion from hypovolemia with profuse diarrhea now resolving.   Repeat BMP in am RESOLVED.   -Continue to trend BMP

## 2018-06-26 NOTE — PROGRESS NOTE ADULT - PROBLEM SELECTOR PLAN 1
Ischemic vs. infectious  most likely given history of diarrhea starting 1 hour after ingesting old dairy product.  She also could have ischemic colitis and was evaluated by surgery in ED who do not recommend a CTA at this time.  Will continue antibiotics.  Avoiding Cipro for Qtc >500.  Zosyn 3.375 Q8hrs  Check stool osm, na and K for gap- If OSM <250 consider excessive laxitive use and calculate osm gap  If diarrhea persists (now no diarrhea all afternoon) would check Cdiff as she was on abx for a tick bite last month (Doxycycline 21 day course).   If found to be ischemic colitis will need GI eval for scope.  Surgery Team1 following appreciate recs.   Continue hydration NS @ 125mls/hr    There is circumferential colonic   wall thickening with colonic wall edema and mild surrounding inflammatory   changes extending from the distal splenic flexure to the mid sigmoid   colon . In addition, in the distal sigmoid there is suggestion of a 1.4   cm enhancing nodule or mass within its wall versus an inflamed   diverticulum.  Given CT finding of mass vs. diverticulitis pt should have repeat imaging or colonoscopy after resolution of acute illness.  Pain control with Morphine 2mg IV Q4 prn severe pain. -Patient presenting with complaints of nausea, vomiting and diarrhea for 1 day which started 1 hour after ingesting old dairy product.   -CT abd/pelvis showing nonspecific colitis from the distal splenic flexure to the sigmoid colon which is a distribution that can be seen with ischemic colitis, but the   differential diagnosis includes infectious or inflammatory colitis.   -Symptoms likely in the setting of infectious colitis vs diverticulitis (fevers, LLQ pain) vs less likely ischemic colitis.   -Overnight, patient denies any further episodes of nausea, vomiting or diarrhea.  -Surgery consulted for management of suspected ischemic colitis, recs appreciated. No indication for CTA at this time.    -Will continue with IVF, pain control and anti emetics. Diet advanced to clear liquid, will advance as tolerated.   -Will continue with ceftriaxone and flagyl for intra abdominal source of infection.   -Follow up stool studies.   -If diarrhea persists, would check Cdiff as she was on abx for a tick bite last month (Doxycycline 21 day course).   -Will need outpatient colonoscopy for further evaluation for suspected ischemic colitis.

## 2018-06-26 NOTE — PROGRESS NOTE ADULT - PROBLEM SELECTOR PLAN 3
Pt had resection of meningioma August 2017 and is on seizure ppx with Keppra 500mg PO daily. -Patient had resection of meningioma in August 2017 and is on seizure ppx with Keppra 500mg PO daily. Will continue inpatient.

## 2018-06-26 NOTE — PROGRESS NOTE ADULT - PROBLEM SELECTOR PLAN 7
NPO except meds until clear that pt will not require surgery, keep mg >2 K >4, NS @125mls/hr Diet: Clear Liquid diet, advance as tolerated  NS @125cc/hr  Replete lytes prn

## 2018-06-26 NOTE — PROGRESS NOTE ADULT - ASSESSMENT
72 yo F w/ pmhx of meningioma, HLD, CAD s/p stent in 2003, and recent treatment for Lyme last month presenting for profuse watery diarrhea, vomiting and abdominal pain. 74 yo F w/ pmhx of meningioma, HLD, CAD s/p stent in 2003, and recent treatment for Lyme last month presenting with complaints of profuse watery diarrhea, vomiting and abdominal pain admitted to [facility] for further management.

## 2018-06-26 NOTE — PROGRESS NOTE ADULT - SUBJECTIVE AND OBJECTIVE BOX
SUBJECTIVE: Pt states diarrhea has resolved, none since coming into the hospital yesterday. She has mild LLQ abd pain, but no N/V, no fevers/chills, no flank/back pain.    Vital Signs Last 24 Hrs  T(C): 37.4 (26 Jun 2018 04:56), Max: 37.4 (26 Jun 2018 04:56)  T(F): 99.4 (26 Jun 2018 04:56), Max: 99.4 (26 Jun 2018 04:56)  HR: 64 (26 Jun 2018 04:56) (64 - 89)  BP: 127/76 (26 Jun 2018 04:56) (127/76 - 164/93)  BP(mean): --  RR: 16 (26 Jun 2018 04:56) (16 - 18)  SpO2: 93% (26 Jun 2018 04:56) (93% - 100%)    Physical Exam:  General: NAD  Pulmonary: Nonlabored breathing, no respiratory distress  Cardiovascular: NSR  Abdominal: soft, ND, mild diffuse TTP across all quadrants, but no guarding/rigidity/rebound tenderness, no organomegaly, +BS x 4  Extremities: WWP, normal strength, no clubbing/cyanosis/edema  Neuro: A/O x3, no focal deficits, normal sensation    LABS:                        11.9   9.2   )-----------( 175      ( 26 Jun 2018 05:31 )             34.9     06-26    138  |  103  |  14  ----------------------------<  121<H>  3.6   |  25  |  0.85    Ca    8.1<L>      26 Jun 2018 05:31  Phos  2.7     06-26  Mg     2.2     06-26    TPro  5.7<L>  /  Alb  3.2<L>  /  TBili  1.5<H>  /  DBili  x   /  AST  15  /  ALT  12  /  AlkPhos  67  06-26    LIVER FUNCTIONS - ( 26 Jun 2018 05:31 )  Alb: 3.2 g/dL / Pro: 5.7 g/dL / ALK PHOS: 67 U/L / ALT: 12 U/L / AST: 15 U/L / GGT: x

## 2018-06-26 NOTE — PROGRESS NOTE ADULT - PROBLEM SELECTOR PLAN 5
Continue ASA, last stent in 2003 however pt has strong family history of young cardiac death.   EKG shows NSR w/o ischemic changes but prolonged QtC 502 so avoid QT prolonging medications.  Continue Atorvastatin QHS -s/p PCI. Continue with ASA 81 mg daily.   -EKG shows NSR w/o ischemic changes but prolonged QtC 502 so avoid QT prolonging medications.  -Continue Atorvastatin QHS

## 2018-06-27 ENCOUNTER — TRANSCRIPTION ENCOUNTER (OUTPATIENT)
Age: 73
End: 2018-06-27

## 2018-06-27 LAB
ALBUMIN SERPL ELPH-MCNC: 3.7 G/DL — SIGNIFICANT CHANGE UP (ref 3.3–5)
ALP SERPL-CCNC: 80 U/L — SIGNIFICANT CHANGE UP (ref 40–120)
ALT FLD-CCNC: 11 U/L — SIGNIFICANT CHANGE UP (ref 10–45)
ANION GAP SERPL CALC-SCNC: 13 MMOL/L — SIGNIFICANT CHANGE UP (ref 5–17)
AST SERPL-CCNC: 16 U/L — SIGNIFICANT CHANGE UP (ref 10–40)
BILIRUB SERPL-MCNC: 1.4 MG/DL — HIGH (ref 0.2–1.2)
BUN SERPL-MCNC: 7 MG/DL — SIGNIFICANT CHANGE UP (ref 7–23)
CALCIUM SERPL-MCNC: 8.7 MG/DL — SIGNIFICANT CHANGE UP (ref 8.4–10.5)
CHLORIDE SERPL-SCNC: 102 MMOL/L — SIGNIFICANT CHANGE UP (ref 96–108)
CO2 SERPL-SCNC: 24 MMOL/L — SIGNIFICANT CHANGE UP (ref 22–31)
CREAT SERPL-MCNC: 0.58 MG/DL — SIGNIFICANT CHANGE UP (ref 0.5–1.3)
GLUCOSE SERPL-MCNC: 123 MG/DL — HIGH (ref 70–99)
HCT VFR BLD CALC: 36.8 % — SIGNIFICANT CHANGE UP (ref 34.5–45)
HGB BLD-MCNC: 12.7 G/DL — SIGNIFICANT CHANGE UP (ref 11.5–15.5)
LEVETIRACETAM SERPL-MCNC: 8.6 MCG/ML — LOW (ref 12–46)
MAGNESIUM SERPL-MCNC: 2 MG/DL — SIGNIFICANT CHANGE UP (ref 1.6–2.6)
MCHC RBC-ENTMCNC: 30.9 PG — SIGNIFICANT CHANGE UP (ref 27–34)
MCHC RBC-ENTMCNC: 34.5 G/DL — SIGNIFICANT CHANGE UP (ref 32–36)
MCV RBC AUTO: 89.5 FL — SIGNIFICANT CHANGE UP (ref 80–100)
PLATELET # BLD AUTO: 178 K/UL — SIGNIFICANT CHANGE UP (ref 150–400)
POTASSIUM SERPL-MCNC: 3.4 MMOL/L — LOW (ref 3.5–5.3)
POTASSIUM SERPL-SCNC: 3.4 MMOL/L — LOW (ref 3.5–5.3)
PROT SERPL-MCNC: 6.1 G/DL — SIGNIFICANT CHANGE UP (ref 6–8.3)
RBC # BLD: 4.11 M/UL — SIGNIFICANT CHANGE UP (ref 3.8–5.2)
RBC # FLD: 14 % — SIGNIFICANT CHANGE UP (ref 10.3–16.9)
SODIUM SERPL-SCNC: 139 MMOL/L — SIGNIFICANT CHANGE UP (ref 135–145)
WBC # BLD: 8.6 K/UL — SIGNIFICANT CHANGE UP (ref 3.8–10.5)
WBC # FLD AUTO: 8.6 K/UL — SIGNIFICANT CHANGE UP (ref 3.8–10.5)

## 2018-06-27 PROCEDURE — 99232 SBSQ HOSP IP/OBS MODERATE 35: CPT | Mod: GC

## 2018-06-27 RX ORDER — SIMETHICONE 80 MG/1
80 TABLET, CHEWABLE ORAL ONCE
Qty: 0 | Refills: 0 | Status: COMPLETED | OUTPATIENT
Start: 2018-06-27 | End: 2018-06-27

## 2018-06-27 RX ORDER — SODIUM CHLORIDE 9 MG/ML
1000 INJECTION INTRAMUSCULAR; INTRAVENOUS; SUBCUTANEOUS
Qty: 0 | Refills: 0 | Status: DISCONTINUED | OUTPATIENT
Start: 2018-06-27 | End: 2018-06-29

## 2018-06-27 RX ORDER — POTASSIUM CHLORIDE 20 MEQ
10 PACKET (EA) ORAL
Qty: 0 | Refills: 0 | Status: COMPLETED | OUTPATIENT
Start: 2018-06-27 | End: 2018-06-27

## 2018-06-27 RX ORDER — SIMETHICONE 80 MG/1
80 TABLET, CHEWABLE ORAL EVERY 8 HOURS
Qty: 0 | Refills: 0 | Status: DISCONTINUED | OUTPATIENT
Start: 2018-06-27 | End: 2018-06-29

## 2018-06-27 RX ADMIN — SODIUM CHLORIDE 125 MILLILITER(S): 9 INJECTION INTRAMUSCULAR; INTRAVENOUS; SUBCUTANEOUS at 14:39

## 2018-06-27 RX ADMIN — HEPARIN SODIUM 5000 UNIT(S): 5000 INJECTION INTRAVENOUS; SUBCUTANEOUS at 06:18

## 2018-06-27 RX ADMIN — SIMETHICONE 80 MILLIGRAM(S): 80 TABLET, CHEWABLE ORAL at 03:54

## 2018-06-27 RX ADMIN — HEPARIN SODIUM 5000 UNIT(S): 5000 INJECTION INTRAVENOUS; SUBCUTANEOUS at 22:28

## 2018-06-27 RX ADMIN — SIMETHICONE 80 MILLIGRAM(S): 80 TABLET, CHEWABLE ORAL at 07:09

## 2018-06-27 RX ADMIN — Medication 650 MILLIGRAM(S): at 06:18

## 2018-06-27 RX ADMIN — Medication 81 MILLIGRAM(S): at 12:06

## 2018-06-27 RX ADMIN — Medication 100 MILLIGRAM(S): at 14:39

## 2018-06-27 RX ADMIN — HEPARIN SODIUM 5000 UNIT(S): 5000 INJECTION INTRAVENOUS; SUBCUTANEOUS at 14:39

## 2018-06-27 RX ADMIN — Medication 100 MILLIGRAM(S): at 22:29

## 2018-06-27 RX ADMIN — Medication 100 MILLIEQUIVALENT(S): at 10:16

## 2018-06-27 RX ADMIN — Medication 650 MILLIGRAM(S): at 22:29

## 2018-06-27 RX ADMIN — LEVETIRACETAM 500 MILLIGRAM(S): 250 TABLET, FILM COATED ORAL at 22:28

## 2018-06-27 RX ADMIN — Medication 100 MILLIEQUIVALENT(S): at 07:09

## 2018-06-27 RX ADMIN — Medication 100 MILLIGRAM(S): at 06:19

## 2018-06-27 RX ADMIN — CEFTRIAXONE 100 GRAM(S): 500 INJECTION, POWDER, FOR SOLUTION INTRAMUSCULAR; INTRAVENOUS at 14:39

## 2018-06-27 RX ADMIN — SODIUM CHLORIDE 125 MILLILITER(S): 9 INJECTION INTRAMUSCULAR; INTRAVENOUS; SUBCUTANEOUS at 03:54

## 2018-06-27 RX ADMIN — SIMETHICONE 80 MILLIGRAM(S): 80 TABLET, CHEWABLE ORAL at 22:28

## 2018-06-27 RX ADMIN — Medication 100 MILLIEQUIVALENT(S): at 09:15

## 2018-06-27 RX ADMIN — ATORVASTATIN CALCIUM 40 MILLIGRAM(S): 80 TABLET, FILM COATED ORAL at 22:28

## 2018-06-27 RX ADMIN — SIMETHICONE 80 MILLIGRAM(S): 80 TABLET, CHEWABLE ORAL at 14:07

## 2018-06-27 NOTE — DISCHARGE NOTE ADULT - SECONDARY DIAGNOSIS.
CAD (coronary artery disease) Meningioma Transition of care performed with sharing of clinical summary

## 2018-06-27 NOTE — PROGRESS NOTE ADULT - ASSESSMENT
72 yo F w/ pmhx of meningioma, HLD, CAD s/p stent in 2003, presents w/ colitis, likely ischemic in etiology given distribution on CT scan   - continue with antibiotic regimen (ceftriaxone / flagyl)   - patient states pain in improving  - continue with reg diet   - one fever overnight not likely due to ischemic colitis, would consider UA to rule out additional etiology of fever   - patient should receive outpatient colonoscopy with Dr. Green

## 2018-06-27 NOTE — CHART NOTE - NSCHARTNOTEFT_GEN_A_CORE
HOSPITAL COURSE:    74 yo F w/ pmhx of HLD, Lyme disease, meningioma s/p resection in August 2017 and CAD s/p stent in 2003 presenting after sharp abdominal crampy pain worse in LLQ, multiple episodes of NBNB vomiting and loose bowel movements with no blood with inability to tolerate PO intake associated with subjective fevers and chills for 1 day. On arrival to [facility] ED, vitals: afebrile, HR 69, blood pressure of 164/93 and no respiratory distress. She was admitted to [facility] for further management.   She had a CT abd/pelvis done which showed Nonspecific colitis from the distal splenic flexure to the sigmoid colon which is a distribution that can be seen with ischemic colitis, but the differential diagnosis includes infectious or inflammatory colitis.   Surgery was consulted given CT findings. As per surgery, symptoms likely infectious and less likely ischemia, no indications for CT angio or further interventions, can proceed with colonoscopy outpatient.   Patient was admitted and managed with IV fluids and IV ceftriaxone and flagyl. While in the hospital, patient had no further episodes of vomiting and diarrhea, diet was advanced as tolerated.   This morning, patient states that her symptoms have resolved, she is able to tolerate PO intake without any nausea and vomiting.   She is clinically and hemodynamically stable to be discharged home with outpatient follow up arranged with her PCP for outpatient colonoscopy. HOSPITAL COURSE:    74 yo F w/ pmhx of HLD, Lyme disease, meningioma s/p resection in August 2017 and CAD s/p stent in 2003 presenting after sharp abdominal crampy pain worse in LLQ, multiple episodes of NBNB vomiting and loose bowel movements with no blood with inability to tolerate PO intake associated with subjective fevers and chills for 1 day. On arrival to [facility] ED, vitals: afebrile, HR 69, blood pressure of 164/93 and no respiratory distress. She was admitted to [facility] for further management.   She had a CT abd/pelvis done which showed Nonspecific colitis from the distal splenic flexure to the sigmoid colon which is a distribution that can be seen with ischemic colitis, but the differential diagnosis includes infectious or inflammatory colitis.   Surgery was consulted given CT findings. As per surgery, symptoms likely infectious and less likely ischemia, no indications for CT angio or further interventions, can proceed with colonoscopy outpatient.   Patient was admitted and managed with IV fluids and IV ceftriaxone and flagyl. While in the hospital, patient had no further episodes of vomiting and diarrhea, diet was advanced as tolerated.

## 2018-06-27 NOTE — PROGRESS NOTE ADULT - PROBLEM SELECTOR PLAN 5
-s/p PCI. Continue with ASA 81 mg daily.   -EKG shows NSR w/o ischemic changes.  -Continue Atorvastatin QHS

## 2018-06-27 NOTE — DISCHARGE NOTE ADULT - ADDITIONAL INSTRUCTIONS
Please follow up with your PCP Dr. Qureshi after discharge. You have a follow up appointment arranged on July 5th at 7:30 am.

## 2018-06-27 NOTE — DISCHARGE NOTE ADULT - HOSPITAL COURSE
72 yo F w/ pmhx of HLD, Lyme disease, meningioma s/p resection in August 2017 and CAD s/p stent in 2003 presenting after sharp abdominal crampy pain worse in LLQ, multiple episodes of NBNB vomiting and loose bowel movements with no blood with inability to tolerate PO intake associated with subjective fevers and chills for 1 day. On arrival to [facility] ED, vitals: afebrile, HR 69, blood pressure of 164/93 and no respiratory distress. She was admitted to [facility] for further management.   She had a CT abd/pelvis done which showed Nonspecific colitis from the distal splenic flexure to the sigmoid colon which is a distribution that can be seen with ischemic colitis, but the differential diagnosis includes infectious or inflammatory colitis.   Surgery was consulted given CT findings. As per surgery, symptoms likely infectious and less likely ischemia, no indications for CT angio or further interventions, can proceed with colonoscopy outpatient.   Patient was admitted and managed with IV fluids and IV ceftriaxone and flagyl. While in the hospital, patient had no further episodes of vomiting and diarrhea 72 yo F w/ pmhx of HLD, Lyme disease, meningioma s/p resection in August 2017 and CAD s/p stent in 2003 presenting after sharp abdominal crampy pain worse in LLQ, multiple episodes of NBNB vomiting and loose bowel movements with no blood with inability to tolerate PO intake associated with subjective fevers and chills for 1 day. On arrival to [facility] ED, vitals: afebrile, HR 69, blood pressure of 164/93 and no respiratory distress. She was admitted to [facility] for further management.   She had a CT abd/pelvis done which showed Nonspecific colitis from the distal splenic flexure to the sigmoid colon which is a distribution that can be seen with ischemic colitis, but the differential diagnosis includes infectious or inflammatory colitis.   Surgery was consulted given CT findings. As per surgery, symptoms likely infectious and less likely ischemia, no indications for CT angio or further interventions, can proceed with colonoscopy outpatient.   Patient was admitted and managed with IV fluids and IV ceftriaxone and flagyl. While in the hospital, patient had no further episodes of vomiting and diarrhea, diet was advanced as tolerated.   This morning, patient states that her symptoms have resolved, she is able to tolerate PO intake without any nausea and vomiting.   She is clinically and hemodynamically stable to be discharged home with outpatient follow up arranged with her PCP for outpatient colonoscopy.

## 2018-06-27 NOTE — PROGRESS NOTE ADULT - PROBLEM SELECTOR PLAN 1
-Patient presenting with complaints of nausea, vomiting and diarrhea for 1 day which started 1 hour after ingesting old dairy product.   -CT abd/pelvis showing nonspecific colitis from the distal splenic flexure to the sigmoid colon which is a distribution that can be seen with ischemic colitis, but the   differential diagnosis includes infectious or inflammatory colitis.   -Symptoms likely in the setting of infectious colitis vs diverticulitis (fevers, LLQ pain) vs less likely ischemic colitis.   -Overnight, patient denies any further episodes of nausea, vomiting or diarrhea.  -Surgery consulted for management of suspected ischemic colitis, recs appreciated. No indication for CTA at this time.    -Will continue with IVF, pain control and anti emetics. Diet advanced to regular diet.  -Will continue with ceftriaxone and flagyl for intra abdominal source of infection.   -Follow up stool studies.  -If diarrhea persists, would check Cdiff as she was on abx for a tick bite last month (Doxycycline 21 day course).   -Will need outpatient colonoscopy for further evaluation for suspected ischemic colitis.

## 2018-06-27 NOTE — DISCHARGE NOTE ADULT - PATIENT PORTAL LINK FT
You can access the [facility], offered by [facility], by registering with the following website: [email or web address]

## 2018-06-27 NOTE — PROGRESS NOTE ADULT - SUBJECTIVE AND OBJECTIVE BOX
Patient advance to CLD overnight and is tolerating, advanced in AM to reg diet   Pain in improving   Positive bowel movement / gas, denies any blood per rectum   Vital signs stable, however one fever overnight   No acute complaints at this time       Vital Signs Last 24 Hrs  T(C): 36.8 (27 Jun 2018 09:11), Max: 38.8 (26 Jun 2018 16:35)  T(F): 98.3 (27 Jun 2018 09:11), Max: 101.8 (26 Jun 2018 16:35)  HR: 73 (27 Jun 2018 09:11) (71 - 76)  BP: 137/75 (27 Jun 2018 09:11) (137/75 - 162/94)  BP(mean): --  RR: 16 (27 Jun 2018 09:11) (16 - 18)  SpO2: 95% (27 Jun 2018 09:11) (93% - 95%)        I&O's Summary    26 Jun 2018 07:01  -  27 Jun 2018 07:00  --------------------------------------------------------  IN: 1700 mL / OUT: 0 mL / NET: 1700 mL        Gen: NAD   Abd: soft, midly ttp throughout abdomen, however no rebound or gaurding, non distended                               12.7   8.6   )-----------( 178      ( 27 Jun 2018 06:03 )             36.8       06-27    139  |  102  |  7   ----------------------------<  123<H>  3.4<L>   |  24  |  0.58    Ca    8.7      27 Jun 2018 06:03  Phos  2.7     06-26  Mg     2.0     06-27    TPro  6.1  /  Alb  3.7  /  TBili  1.4<H>  /  DBili  x   /  AST  16  /  ALT  11  /  AlkPhos  80  06-27

## 2018-06-27 NOTE — PROGRESS NOTE ADULT - ASSESSMENT
72 yo F w/ pmhx of meningioma, HLD, CAD s/p stent in 2003, and recent treatment for Lyme last month presenting with complaints of profuse watery diarrhea, vomiting and abdominal pain admitted to [facility] for further management.

## 2018-06-27 NOTE — PROGRESS NOTE ADULT - PROBLEM SELECTOR PLAN 3
-Patient had resection of meningioma in August 2017 and is on seizure ppx with Keppra 500mg PO daily. Will continue inpatient.

## 2018-06-27 NOTE — DISCHARGE NOTE ADULT - CARE PLAN
Principal Discharge DX:	Colitis  Goal:	To continue antibiotic treatment as prescribed and follow up with your PCP after discharge.  Secondary Diagnosis:	CAD (coronary artery disease)  Assessment and plan of treatment:	To continue your home medications as prescribed and follow  Secondary Diagnosis:	Meningioma Principal Discharge DX:	Colitis  Goal:	To continue antibiotic treatment as prescribed and follow up with your PCP after discharge.  Assessment and plan of treatment:	You presented to the hospital with symptoms of nausea, vomiting, abdominal pain and loose bowel movements. You were admitted to our medicine floors for further management. You were managed with IV fluids, antibiotics and your diet was advanced as tolerated.   You had a CT abdomen pelvis done which showed nonspecific colitis which could be either secondary to ischemic colitis vs infectious or inflammatory colitis.   We had our surgery team follow you, as per their expert opinion they recommend colonoscopy after discharge for further evaluation.  This morning, your nausea, vomiting and diarrhea have resolved and you are tolerating a regualr diet.   You are stable to be discharged. You have a follow up appointment with Dr. Qureshi on July 5th at 7:30 am who may refer you to a gastroenterologist for further evaluation.  Secondary Diagnosis:	CAD (coronary artery disease)  Assessment and plan of treatment:	To continue your home medications as prescribed and follow up with your PCP after discharge.  Secondary Diagnosis:	Meningioma  Assessment and plan of treatment:	To continue your home medications as prescribed and follow up with your PCP after discharge. Principal Discharge DX:	Colitis  Goal:	To continue antibiotic treatment as prescribed and follow up with your PCP after discharge.  Assessment and plan of treatment:	You presented to the hospital with symptoms of nausea, vomiting, abdominal pain and loose bowel movements. You were admitted to our medicine floors for further management. You were managed with IV fluids, antibiotics and your diet was advanced as tolerated.   You had a CT abdomen pelvis done which showed nonspecific colitis which could be either secondary to ischemic colitis vs infectious or inflammatory colitis.   We had our surgery team follow you, as per their expert opinion they recommend colonoscopy after discharge for further evaluation.  This morning, your nausea, vomiting and diarrhea have resolved and you are tolerating a regualr diet.   You are stable to be discharged.  Secondary Diagnosis:	CAD (coronary artery disease)  Assessment and plan of treatment:	To continue your home medications as prescribed and follow up with your PCP after discharge.  Secondary Diagnosis:	Meningioma  Assessment and plan of treatment:	To continue your home medications as prescribed and follow up with your PCP after discharge.  Secondary Diagnosis:	Transition of care performed with sharing of clinical summary  Goal:	Appointment with PCP  Assessment and plan of treatment:	You have a follow up appointment with Dr. Qureshi on July 5th at 7:30 am who may refer you to a gastroenterologist for further evaluation. Principal Discharge DX:	Colitis  Goal:	To continue antibiotic treatment as prescribed and follow up with your PCP after discharge.  Assessment and plan of treatment:	You presented to the hospital with symptoms of nausea, vomiting, abdominal pain and loose bowel movements. You were admitted to our medicine floors for further management. You were managed with IV fluids, antibiotics and your diet was advanced as tolerated.   You had a CT abdomen pelvis done which showed nonspecific colitis which could be either secondary to ischemic colitis vs infectious or inflammatory colitis.   We had our surgery team follow you, as per their expert opinion they recommend colonoscopy after discharge for further evaluation.  This morning, your nausea, vomiting and diarrhea have resolved and you are tolerating a regualr diet.   You are stable to be discharged. Please take both metronidazole and ciprofloxacin every 8 hours for 3 more days.  Secondary Diagnosis:	CAD (coronary artery disease)  Assessment and plan of treatment:	To continue your home medications as prescribed and follow up with your PCP after discharge.  Secondary Diagnosis:	Meningioma  Assessment and plan of treatment:	To continue your home medications as prescribed and follow up with your PCP after discharge.  Secondary Diagnosis:	Transition of care performed with sharing of clinical summary  Goal:	Appointment with PCP  Assessment and plan of treatment:	You have a follow up appointment with Dr. Qureshi on July 5th at 7:30 am who may refer you to a gastroenterologist for further evaluation.

## 2018-06-27 NOTE — DISCHARGE NOTE ADULT - CARE PROVIDER_API CALL
Wally Qureshi), Preventive Medicine  [address] [place] [address]  Phone: [phone]  Fax: [phone]    Bettye Murphy), Critical Care Medicine; Pulmonary Disease  [address] [address]  Phone: [phone]  Fax: [phone]

## 2018-06-27 NOTE — DISCHARGE NOTE ADULT - MEDICATION SUMMARY - MEDICATIONS TO TAKE
I will START or STAY ON the medications listed below when I get home from the hospital:    metroNIDAZOLE 500 mg oral tablet  -- 1 tab(s) by mouth every 8 hours  -- Indication: For Colitis    aspirin 81 mg oral tablet  -- 1 tab(s) by mouth once a day  -- Indication: For Need for prophylactic measure    atorvastatin  -- 40 milligram(s) by mouth once (at bedtime)  -- Indication: For CAD (coronary artery disease)    simethicone 40 mg oral disintegrating strip  -- 40 milligram(s) by mouth 3 times a day (after meals), As Needed   -- Indication: For Nutrition, metabolism, and development symptoms    Cipro 500 mg oral tablet  -- 1 tab(s) by mouth every 8 hours   -- Avoid prolonged or excessive exposure to direct and/or artificial sunlight while taking this medication.  Check with your doctor before becoming pregnant.  Do not take dairy products, antacids, or iron preparations within one hour of this medication.  Finish all this medication unless otherwise directed by prescriber.  Medication should be taken with plenty of water.    -- Indication: For Colitis

## 2018-06-27 NOTE — PROGRESS NOTE ADULT - SUBJECTIVE AND OBJECTIVE BOX
Chief Complaint:    OVERNIGHT EVENTS:    SUBJECTIVE / INTERVAL HPI: Patient seen and examined at bedside.     VITAL SIGNS:  Vital Signs Last 24 Hrs  T(C): 36.8 (27 Jun 2018 09:11), Max: 38.8 (26 Jun 2018 16:35)  T(F): 98.3 (27 Jun 2018 09:11), Max: 101.8 (26 Jun 2018 16:35)  HR: 73 (27 Jun 2018 09:11) (71 - 76)  BP: 137/75 (27 Jun 2018 09:11) (137/75 - 162/94)  BP(mean): --  RR: 16 (27 Jun 2018 09:11) (16 - 18)  SpO2: 95% (27 Jun 2018 09:11) (93% - 95%)    PHYSICAL EXAM:    General: WDWN  HEENT: NC/AT; PERRL, anicteric sclera; MMM  Neck: supple  Cardiovascular: +S1/S2; RRR  Respiratory: CTA B/L; no W/R/R  Gastrointestinal: soft, NT/ND; +BSx4  Extremities: WWP; no edema, clubbing or cyanosis  Vascular: 2+ radial, DP/PT pulses B/L  Neurological: AAOx3; no focal deficits    MEDICATIONS:  MEDICATIONS  (STANDING):  aspirin enteric coated 81 milliGRAM(s) Oral daily  atorvastatin 40 milliGRAM(s) Oral at bedtime  cefTRIAXone   IVPB 1 Gram(s) IV Intermittent every 24 hours  heparin  Injectable 5000 Unit(s) SubCutaneous every 8 hours  levETIRAcetam 500 milliGRAM(s) Oral at bedtime  metroNIDAZOLE  IVPB 500 milliGRAM(s) IV Intermittent every 8 hours  sodium chloride 0.9%. 1000 milliLiter(s) (125 mL/Hr) IV Continuous <Continuous>    MEDICATIONS  (PRN):  acetaminophen   Tablet 650 milliGRAM(s) Oral every 6 hours PRN For Temp greater than 38 C (100.4 F)  morphine  - Injectable 2 milliGRAM(s) IV Push every 4 hours PRN Moderate Pain (4 - 6)  simethicone 80 milliGRAM(s) Chew every 8 hours PRN Gas      ALLERGIES:  Allergies    No Known Allergies    Intolerances        LABS:                        12.7   8.6   )-----------( 178      ( 27 Jun 2018 06:03 )             36.8     06-27    139  |  102  |  7   ----------------------------<  123<H>  3.4<L>   |  24  |  0.58    Ca    8.7      27 Jun 2018 06:03  Phos  2.7     06-26  Mg     2.0     06-27    TPro  6.1  /  Alb  3.7  /  TBili  1.4<H>  /  DBili  x   /  AST  16  /  ALT  11  /  AlkPhos  80  06-27    CAPILLARY BLOOD GLUCOSE  RADIOLOGY & ADDITIONAL TESTS: Reviewed. Chief Complaint:  74 yo F w/ pmhx of meningioma, HLD, CAD s/p stent in 2003, and recent treatment for Lyme last month presenting with complaints of profuse watery diarrhea, vomiting and abdominal pain admitted to [facility] for further management.     OVERNIGHT EVENTS: NAEO.     SUBJECTIVE / INTERVAL HPI: Patient seen and examined at bedside. NAD. Patient denies any episodes of vomiting and diarrhea since admission, her nausea has resolved. She is complaining of abdominal cramping which has improved since admission.     VITAL SIGNS:  Vital Signs Last 24 Hrs  T(C): 36.8 (27 Jun 2018 09:11), Max: 38.8 (26 Jun 2018 16:35)  T(F): 98.3 (27 Jun 2018 09:11), Max: 101.8 (26 Jun 2018 16:35)  HR: 73 (27 Jun 2018 09:11) (71 - 76)  BP: 137/75 (27 Jun 2018 09:11) (137/75 - 162/94)  BP(mean): --  RR: 16 (27 Jun 2018 09:11) (16 - 18)  SpO2: 95% (27 Jun 2018 09:11) (93% - 95%)    PHYSICAL EXAM:    General: WDWN  HEENT: NC/AT; PERRL, anicteric sclera; MMM  Neck: supple  Cardiovascular: +S1/S2; RRR  Respiratory: CTA B/L; no W/R/R  Gastrointestinal: soft, NT/ND; +BSx4  Extremities: WWP; no edema, clubbing or cyanosis  Vascular: 2+ radial, DP/PT pulses B/L  Neurological: AAOx3; no focal deficits    MEDICATIONS:  MEDICATIONS  (STANDING):  aspirin enteric coated 81 milliGRAM(s) Oral daily  atorvastatin 40 milliGRAM(s) Oral at bedtime  cefTRIAXone   IVPB 1 Gram(s) IV Intermittent every 24 hours  heparin  Injectable 5000 Unit(s) SubCutaneous every 8 hours  levETIRAcetam 500 milliGRAM(s) Oral at bedtime  metroNIDAZOLE  IVPB 500 milliGRAM(s) IV Intermittent every 8 hours  sodium chloride 0.9%. 1000 milliLiter(s) (125 mL/Hr) IV Continuous <Continuous>    MEDICATIONS  (PRN):  acetaminophen   Tablet 650 milliGRAM(s) Oral every 6 hours PRN For Temp greater than 38 C (100.4 F)  morphine  - Injectable 2 milliGRAM(s) IV Push every 4 hours PRN Moderate Pain (4 - 6)  simethicone 80 milliGRAM(s) Chew every 8 hours PRN Gas      ALLERGIES:  Allergies  No Known Allergies  Intolerances    LABS:                        12.7   8.6   )-----------( 178      ( 27 Jun 2018 06:03 )             36.8     06-27    139  |  102  |  7   ----------------------------<  123<H>  3.4<L>   |  24  |  0.58    Ca    8.7      27 Jun 2018 06:03  Phos  2.7     06-26  Mg     2.0     06-27    TPro  6.1  /  Alb  3.7  /  TBili  1.4<H>  /  DBili  x   /  AST  16  /  ALT  11  /  AlkPhos  80  06-27    CAPILLARY BLOOD GLUCOSE  RADIOLOGY & ADDITIONAL TESTS: Reviewed. Chief Complaint:  74 yo F w/ pmhx of meningioma, HLD, CAD s/p stent in 2003, and recent treatment for Lyme last month presenting with complaints of profuse watery diarrhea, vomiting and abdominal pain admitted to [facility] for further management.     OVERNIGHT EVENTS: NAEO.     SUBJECTIVE / INTERVAL HPI: Patient seen and examined at bedside. NAD. Patient denies any episodes of vomiting and diarrhea since admission, her nausea has resolved. She is complaining of abdominal cramping which has improved since admission. All remaining ROS negative.     VITAL SIGNS:  Vital Signs Last 24 Hrs  T(C): 36.8 (27 Jun 2018 09:11), Max: 38.8 (26 Jun 2018 16:35)  T(F): 98.3 (27 Jun 2018 09:11), Max: 101.8 (26 Jun 2018 16:35)  HR: 73 (27 Jun 2018 09:11) (71 - 76)  BP: 137/75 (27 Jun 2018 09:11) (137/75 - 162/94)  BP(mean): --  RR: 16 (27 Jun 2018 09:11) (16 - 18)  SpO2: 95% (27 Jun 2018 09:11) (93% - 95%)    PHYSICAL EXAM:  General: NAD  HEENT: NC/AT; PERRL, anicteric sclera; MMM  Neck: supple, no LAD, no JVD  Cardiovascular: +S1/S2; RRR  Respiratory: CTA B/L; no W/R/R  Gastrointestinal: soft, TTP in LLQ. bowel sounds positive.   Extremities: WWP; no edema, clubbing or cyanosis  Vascular: 2+ radial, DP/PT pulses B/L  Neurological: AAOx3; no focal deficits    MEDICATIONS:  MEDICATIONS  (STANDING):  aspirin enteric coated 81 milliGRAM(s) Oral daily  atorvastatin 40 milliGRAM(s) Oral at bedtime  cefTRIAXone   IVPB 1 Gram(s) IV Intermittent every 24 hours  heparin  Injectable 5000 Unit(s) SubCutaneous every 8 hours  levETIRAcetam 500 milliGRAM(s) Oral at bedtime  metroNIDAZOLE  IVPB 500 milliGRAM(s) IV Intermittent every 8 hours  sodium chloride 0.9%. 1000 milliLiter(s) (125 mL/Hr) IV Continuous <Continuous>    MEDICATIONS  (PRN):  acetaminophen   Tablet 650 milliGRAM(s) Oral every 6 hours PRN For Temp greater than 38 C (100.4 F)  morphine  - Injectable 2 milliGRAM(s) IV Push every 4 hours PRN Moderate Pain (4 - 6)  simethicone 80 milliGRAM(s) Chew every 8 hours PRN Gas      ALLERGIES:  Allergies  No Known Allergies  Intolerances    LABS:                        12.7   8.6   )-----------( 178      ( 27 Jun 2018 06:03 )             36.8     06-27    139  |  102  |  7   ----------------------------<  123<H>  3.4<L>   |  24  |  0.58    Ca    8.7      27 Jun 2018 06:03  Phos  2.7     06-26  Mg     2.0     06-27    TPro  6.1  /  Alb  3.7  /  TBili  1.4<H>  /  DBili  x   /  AST  16  /  ALT  11  /  AlkPhos  80  06-27    CAPILLARY BLOOD GLUCOSE  RADIOLOGY & ADDITIONAL TESTS: Reviewed.

## 2018-06-28 PROCEDURE — 99232 SBSQ HOSP IP/OBS MODERATE 35: CPT | Mod: GC

## 2018-06-28 PROCEDURE — 74018 RADEX ABDOMEN 1 VIEW: CPT | Mod: 26

## 2018-06-28 RX ORDER — LACTULOSE 10 G/15ML
10 SOLUTION ORAL ONCE
Qty: 0 | Refills: 0 | Status: COMPLETED | OUTPATIENT
Start: 2018-06-28 | End: 2018-06-29

## 2018-06-28 RX ORDER — POLYETHYLENE GLYCOL 3350 17 G/17G
17 POWDER, FOR SOLUTION ORAL EVERY 24 HOURS
Qty: 0 | Refills: 0 | Status: DISCONTINUED | OUTPATIENT
Start: 2018-06-28 | End: 2018-06-29

## 2018-06-28 RX ORDER — POTASSIUM CHLORIDE 20 MEQ
40 PACKET (EA) ORAL ONCE
Qty: 0 | Refills: 0 | Status: COMPLETED | OUTPATIENT
Start: 2018-06-28 | End: 2018-06-28

## 2018-06-28 RX ADMIN — HEPARIN SODIUM 5000 UNIT(S): 5000 INJECTION INTRAVENOUS; SUBCUTANEOUS at 06:32

## 2018-06-28 RX ADMIN — LEVETIRACETAM 500 MILLIGRAM(S): 250 TABLET, FILM COATED ORAL at 22:51

## 2018-06-28 RX ADMIN — POLYETHYLENE GLYCOL 3350 17 GRAM(S): 17 POWDER, FOR SOLUTION ORAL at 10:17

## 2018-06-28 RX ADMIN — HEPARIN SODIUM 5000 UNIT(S): 5000 INJECTION INTRAVENOUS; SUBCUTANEOUS at 22:50

## 2018-06-28 RX ADMIN — CEFTRIAXONE 100 GRAM(S): 500 INJECTION, POWDER, FOR SOLUTION INTRAMUSCULAR; INTRAVENOUS at 15:10

## 2018-06-28 RX ADMIN — Medication 40 MILLIEQUIVALENT(S): at 17:38

## 2018-06-28 RX ADMIN — Medication 100 MILLIGRAM(S): at 06:33

## 2018-06-28 RX ADMIN — Medication 100 MILLIGRAM(S): at 22:48

## 2018-06-28 RX ADMIN — ATORVASTATIN CALCIUM 40 MILLIGRAM(S): 80 TABLET, FILM COATED ORAL at 22:50

## 2018-06-28 RX ADMIN — Medication 81 MILLIGRAM(S): at 15:10

## 2018-06-28 RX ADMIN — SIMETHICONE 80 MILLIGRAM(S): 80 TABLET, CHEWABLE ORAL at 08:37

## 2018-06-28 RX ADMIN — Medication 10 MILLIGRAM(S): at 22:49

## 2018-06-28 RX ADMIN — SODIUM CHLORIDE 125 MILLILITER(S): 9 INJECTION INTRAMUSCULAR; INTRAVENOUS; SUBCUTANEOUS at 06:34

## 2018-06-28 RX ADMIN — Medication 10 MILLIGRAM(S): at 15:10

## 2018-06-28 RX ADMIN — Medication 100 MILLIGRAM(S): at 15:10

## 2018-06-28 RX ADMIN — HEPARIN SODIUM 5000 UNIT(S): 5000 INJECTION INTRAVENOUS; SUBCUTANEOUS at 15:10

## 2018-06-28 NOTE — PROGRESS NOTE ADULT - SUBJECTIVE AND OBJECTIVE BOX
6/27: No diarrhea or vomiting. Continue NS @125 cc/hr. Advanced diet to regular, tolerating. Ordered simethicone for gas.   t max 100.8 overnight   HD stable   abdominal pain markedly improved but patient remains       Vital Signs Last 24 Hrs  T(C): 36.8 (28 Jun 2018 08:49), Max: 38.2 (27 Jun 2018 22:30)  T(F): 98.3 (28 Jun 2018 08:49), Max: 100.8 (27 Jun 2018 22:30)  HR: 75 (28 Jun 2018 08:49) (72 - 78)  BP: 139/81 (28 Jun 2018 08:49) (138/89 - 165/91)  BP(mean): --  RR: 17 (28 Jun 2018 08:49) (16 - 18)  SpO2: 95% (28 Jun 2018 08:49) (94% - 95%)        I&O's Summary    27 Jun 2018 07:01  -  28 Jun 2018 07:00  --------------------------------------------------------  IN: 1500 mL / OUT: 0 mL / NET: 1500 mL          Gen: NAD   Abd: soft, nt. mildly distended                             12.7   8.6   )-----------( 178      ( 27 Jun 2018 06:03 )             36.8         06-27    139  |  102  |  7   ----------------------------<  123<H>  3.4<L>   |  24  |  0.58    Ca    8.7      27 Jun 2018 06:03  Mg     2.0     06-27    TPro  6.1  /  Alb  3.7  /  TBili  1.4<H>  /  DBili  x   /  AST  16  /  ALT  11  /  AlkPhos  80  06-27        74 yo F w/ pmhx of meningioma, HLD, CAD s/p stent in 2003, presents w/ colitis, likely ischemic in etiology given distribution on CT scan, now w/ likely ileus   - CXR shows dilated loops of colon with contrast that does not progress past splenic flexure   - patient states positive flatus but denies bowel movement, add bowel regimen (miralax), would replete electrolytes (K+, phosphorus)   - continue with antibiotic regimen (ceftriaxone / flagyl) --> if patient clinically improves can consider d/c on PO abx   - continue with reg diet   - one fever overnight not likely due to ischemic colitis --> would consider UA for fever workup   - patient should receive outpatient colonoscopy with Dr. Green   - discussed with Dr. Green

## 2018-06-28 NOTE — PROGRESS NOTE ADULT - PROBLEM SELECTOR PLAN 1
-Patient presenting with complaints of nausea, vomiting and diarrhea for 1 day which started 1 hour after ingesting old dairy product.   -CT abd/pelvis showing nonspecific colitis from the distal splenic flexure to the sigmoid colon which is a distribution that can be seen with ischemic colitis, but the   differential diagnosis includes infectious or inflammatory colitis.   -Symptoms likely in the setting of infectious colitis vs diverticulitis (fevers, LLQ pain) vs less likely ischemic colitis.   -Overnight, patient denies any further episodes of nausea, vomiting or diarrhea. Fever spike to 100.7 but subsided after several hours.   -Surgery suspects infectious colitis. No indication for CTA at this time.    -Will continue with IVF, pain control and anti emetics. Diet advanced to regular diet.  -Will continue with ceftriaxone and flagyl for intra abdominal source of infection.   -Follow up stool studies.  -If diarrhea persists, would check Cdiff as she was on abx for a tick bite last month (Doxycycline 21 day course).   -Will need outpatient colonoscopy for further evaluation for suspected ischemic colitis. -Patient presenting with complaints of nausea, vomiting and diarrhea for 1 day which started 1 hour after ingesting old dairy product.   -CT abd/pelvis showing nonspecific colitis from the distal splenic flexure to the sigmoid colon which is a distribution that can be seen with ischemic colitis, but the   differential diagnosis includes infectious or inflammatory colitis.   -Symptoms likely in the setting of infectious colitis vs diverticulitis (fevers, LLQ pain) vs less likely ischemic colitis.   -Overnight, patient denies any further episodes of nausea, vomiting or diarrhea. Fever spike to 100.7 but subsided after several hours. Will order U/A to look for source.   -Surgery suspects infectious colitis. No indication for CTA at this time.    -Will continue with IVF, pain control and anti emetics. Diet advanced to regular diet.  -Will continue with ceftriaxone and flagyl for intra abdominal source of infection.   -Follow up stool studies.  -If diarrhea persists, would check Cdiff as she was on abx for a tick bite last month (Doxycycline 21 day course).   -Will need outpatient colonoscopy for further evaluation for suspected ischemic colitis. -Patient presenting with complaints of nausea, vomiting and diarrhea for 1 day which started 1 hour after ingesting old dairy product.   -CT abd/pelvis showing nonspecific colitis from the distal splenic flexure to the sigmoid colon which is a distribution that can be seen with ischemic colitis, but the   differential diagnosis includes infectious or inflammatory colitis.   -Symptoms likely in the setting of infectious colitis vs diverticulitis (fevers, LLQ pain) vs less likely ischemic colitis.   -Overnight, patient denies any further episodes of nausea, vomiting or diarrhea. Fever spike to 100.7 but subsided after several hours. Will order U/A to look for source.   -Surgery suspects infectious colitis. No indication for CTA at this time. If pt passes bowel movement and gas, will discharge tomorrow.   -Will continue with IVF, pain control and anti emetics. Diet advanced to regular diet.  -Will continue with ceftriaxone and flagyl for intra abdominal source of infection.   -Follow up stool studies.  -If diarrhea persists, would check Cdiff as she was on abx for a tick bite last month (Doxycycline 21 day course).   -Will need outpatient colonoscopy for further evaluation for suspected ischemic colitis.

## 2018-06-28 NOTE — PROGRESS NOTE ADULT - PROBLEM SELECTOR PROBLEM 2
Metabolic acidosis with increased anion gap and accumulation of organic acids

## 2018-06-28 NOTE — PROGRESS NOTE ADULT - SUBJECTIVE AND OBJECTIVE BOX
OVERNIGHT EVENTS:  Pt spiked a fever to 100.7 at 10pm. Night resident notified later and pt was no longer febrile     SUBJECTIVE / INTERVAL HPI: Patient seen and examined at bedside.     Pt denies any nausea or vomitting. Pt has not had a bowel movement and has not passed gas since admission. Pt complains of abdominal pain exaggerated in the LLQ but has improved since admission.     VITAL SIGNS:  Vital Signs Last 24 Hrs  T(C): 36.8 (28 Jun 2018 08:49), Max: 38.2 (27 Jun 2018 22:30)  T(F): 98.3 (28 Jun 2018 08:49), Max: 100.8 (27 Jun 2018 22:30)  HR: 75 (28 Jun 2018 08:49) (72 - 78)  BP: 139/81 (28 Jun 2018 08:49) (138/89 - 165/91)  BP(mean): --  RR: 17 (28 Jun 2018 08:49) (16 - 18)  SpO2: 95% (28 Jun 2018 08:49) (94% - 95%)    PHYSICAL EXAM:    General: WDWN  HEENT: NC/AT; PERRL, anicteric sclera; MMM  Neck: supple  Cardiovascular: +S1/S2; RRR  Respiratory: CTA B/L; no W/R/R  Gastrointestinal: soft, NT/ND; +BSx4  Extremities: WWP; no edema, clubbing or cyanosis  Vascular: 2+ radial, DP/PT pulses B/L  Neurological: AAOx3; no focal deficits    MEDICATIONS:  MEDICATIONS  (STANDING):  aspirin enteric coated 81 milliGRAM(s) Oral daily  atorvastatin 40 milliGRAM(s) Oral at bedtime  cefTRIAXone   IVPB 1 Gram(s) IV Intermittent every 24 hours  heparin  Injectable 5000 Unit(s) SubCutaneous every 8 hours  levETIRAcetam 500 milliGRAM(s) Oral at bedtime  metroNIDAZOLE  IVPB 500 milliGRAM(s) IV Intermittent every 8 hours  polyethylene glycol 3350 17 Gram(s) Oral every 24 hours  sodium chloride 0.9%. 1000 milliLiter(s) (125 mL/Hr) IV Continuous <Continuous>    MEDICATIONS  (PRN):  acetaminophen   Tablet 650 milliGRAM(s) Oral every 6 hours PRN For Temp greater than 38 C (100.4 F)  morphine  - Injectable 2 milliGRAM(s) IV Push every 4 hours PRN Moderate Pain (4 - 6)  simethicone 80 milliGRAM(s) Chew every 8 hours PRN Gas      ALLERGIES:  Allergies    No Known Allergies    Intolerances        LABS:                        12.7   8.6   )-----------( 178      ( 27 Jun 2018 06:03 )             36.8     06-27    139  |  102  |  7   ----------------------------<  123<H>  3.4<L>   |  24  |  0.58    Ca    8.7      27 Jun 2018 06:03  Mg     2.0     06-27    TPro  6.1  /  Alb  3.7  /  TBili  1.4<H>  /  DBili  x   /  AST  16  /  ALT  11  /  AlkPhos  80  06-27        CAPILLARY BLOOD GLUCOSE          RADIOLOGY & ADDITIONAL TESTS: Reviewed.    ASSESSMENT:    PLAN:

## 2018-06-28 NOTE — PROGRESS NOTE ADULT - ASSESSMENT
74 yo F w/ pmhx of meningioma, HLD, CAD s/p stent in 2003, and recent treatment for Lyme last month presenting with complaints of profuse watery diarrhea, vomiting and abdominal pain admitted to [facility] for further management.

## 2018-06-29 ENCOUNTER — INPATIENT (INPATIENT)
Facility: HOSPITAL | Age: 73
LOS: 9 days | Discharge: NURSING, REHAB OR LONG-TERM CARE | DRG: 330 | End: 2018-07-09
Attending: SURGERY | Admitting: SURGERY
Payer: MEDICARE

## 2018-06-29 VITALS
HEART RATE: 106 BPM | OXYGEN SATURATION: 95 % | DIASTOLIC BLOOD PRESSURE: 112 MMHG | TEMPERATURE: 98 F | SYSTOLIC BLOOD PRESSURE: 162 MMHG | WEIGHT: 130.07 LBS | RESPIRATION RATE: 18 BRPM

## 2018-06-29 VITALS — DIASTOLIC BLOOD PRESSURE: 99 MMHG | HEART RATE: 89 BPM | SYSTOLIC BLOOD PRESSURE: 156 MMHG

## 2018-06-29 DIAGNOSIS — Z86.011 PERSONAL HISTORY OF BENIGN NEOPLASM OF THE BRAIN: Chronic | ICD-10-CM

## 2018-06-29 DIAGNOSIS — Z90.710 ACQUIRED ABSENCE OF BOTH CERVIX AND UTERUS: Chronic | ICD-10-CM

## 2018-06-29 LAB
ANION GAP SERPL CALC-SCNC: 12 MMOL/L — SIGNIFICANT CHANGE UP (ref 5–17)
BLD GP AB SCN SERPL QL: NEGATIVE — SIGNIFICANT CHANGE UP
BUN SERPL-MCNC: 6 MG/DL — LOW (ref 7–23)
CALCIUM SERPL-MCNC: 9.1 MG/DL — SIGNIFICANT CHANGE UP (ref 8.4–10.5)
CHLORIDE SERPL-SCNC: 103 MMOL/L — SIGNIFICANT CHANGE UP (ref 96–108)
CO2 SERPL-SCNC: 26 MMOL/L — SIGNIFICANT CHANGE UP (ref 22–31)
CREAT SERPL-MCNC: 0.66 MG/DL — SIGNIFICANT CHANGE UP (ref 0.5–1.3)
GLUCOSE SERPL-MCNC: 126 MG/DL — HIGH (ref 70–99)
HCT VFR BLD CALC: 37.3 % — SIGNIFICANT CHANGE UP (ref 34.5–45)
HGB BLD-MCNC: 12.9 G/DL — SIGNIFICANT CHANGE UP (ref 11.5–15.5)
LACTATE SERPL-SCNC: 2.5 MMOL/L — HIGH (ref 0.5–2)
LACTATE SERPL-SCNC: 4 MMOL/L — CRITICAL HIGH (ref 0.5–2)
MCHC RBC-ENTMCNC: 30.4 PG — SIGNIFICANT CHANGE UP (ref 27–34)
MCHC RBC-ENTMCNC: 34.6 G/DL — SIGNIFICANT CHANGE UP (ref 32–36)
MCV RBC AUTO: 88 FL — SIGNIFICANT CHANGE UP (ref 80–100)
PLATELET # BLD AUTO: 229 K/UL — SIGNIFICANT CHANGE UP (ref 150–400)
POTASSIUM SERPL-MCNC: 3.3 MMOL/L — LOW (ref 3.5–5.3)
POTASSIUM SERPL-SCNC: 3.3 MMOL/L — LOW (ref 3.5–5.3)
RBC # BLD: 4.24 M/UL — SIGNIFICANT CHANGE UP (ref 3.8–5.2)
RBC # FLD: 13.6 % — SIGNIFICANT CHANGE UP (ref 10.3–16.9)
RH IG SCN BLD-IMP: POSITIVE — SIGNIFICANT CHANGE UP
SODIUM SERPL-SCNC: 141 MMOL/L — SIGNIFICANT CHANGE UP (ref 135–145)
WBC # BLD: 6.7 K/UL — SIGNIFICANT CHANGE UP (ref 3.8–10.5)
WBC # FLD AUTO: 6.7 K/UL — SIGNIFICANT CHANGE UP (ref 3.8–10.5)

## 2018-06-29 PROCEDURE — 71045 X-RAY EXAM CHEST 1 VIEW: CPT | Mod: 26

## 2018-06-29 PROCEDURE — 96375 TX/PRO/DX INJ NEW DRUG ADDON: CPT

## 2018-06-29 PROCEDURE — 85027 COMPLETE CBC AUTOMATED: CPT

## 2018-06-29 PROCEDURE — 87040 BLOOD CULTURE FOR BACTERIA: CPT

## 2018-06-29 PROCEDURE — 71045 X-RAY EXAM CHEST 1 VIEW: CPT

## 2018-06-29 PROCEDURE — 83605 ASSAY OF LACTIC ACID: CPT

## 2018-06-29 PROCEDURE — 80053 COMPREHEN METABOLIC PANEL: CPT

## 2018-06-29 PROCEDURE — 36415 COLL VENOUS BLD VENIPUNCTURE: CPT

## 2018-06-29 PROCEDURE — 74018 RADEX ABDOMEN 1 VIEW: CPT

## 2018-06-29 PROCEDURE — 85025 COMPLETE CBC W/AUTO DIFF WBC: CPT

## 2018-06-29 PROCEDURE — 83735 ASSAY OF MAGNESIUM: CPT

## 2018-06-29 PROCEDURE — 74177 CT ABD & PELVIS W/CONTRAST: CPT

## 2018-06-29 PROCEDURE — 99285 EMERGENCY DEPT VISIT HI MDM: CPT | Mod: 25

## 2018-06-29 PROCEDURE — 83690 ASSAY OF LIPASE: CPT

## 2018-06-29 PROCEDURE — 80177 DRUG SCRN QUAN LEVETIRACETAM: CPT

## 2018-06-29 PROCEDURE — 80048 BASIC METABOLIC PNL TOTAL CA: CPT

## 2018-06-29 PROCEDURE — 99291 CRITICAL CARE FIRST HOUR: CPT

## 2018-06-29 PROCEDURE — 84100 ASSAY OF PHOSPHORUS: CPT

## 2018-06-29 PROCEDURE — 96374 THER/PROPH/DIAG INJ IV PUSH: CPT | Mod: XU

## 2018-06-29 PROCEDURE — 74177 CT ABD & PELVIS W/CONTRAST: CPT | Mod: 26

## 2018-06-29 PROCEDURE — 93005 ELECTROCARDIOGRAM TRACING: CPT

## 2018-06-29 PROCEDURE — 99238 HOSP IP/OBS DSCHRG MGMT 30/<: CPT

## 2018-06-29 RX ORDER — LACTULOSE 10 G/15ML
20 SOLUTION ORAL ONCE
Qty: 0 | Refills: 0 | Status: COMPLETED | OUTPATIENT
Start: 2018-06-29 | End: 2018-06-29

## 2018-06-29 RX ORDER — CEFTRIAXONE 500 MG/1
1 INJECTION, POWDER, FOR SOLUTION INTRAMUSCULAR; INTRAVENOUS EVERY 24 HOURS
Qty: 0 | Refills: 0 | Status: DISCONTINUED | OUTPATIENT
Start: 2018-06-30 | End: 2018-06-30

## 2018-06-29 RX ORDER — METRONIDAZOLE 500 MG
TABLET ORAL
Qty: 0 | Refills: 0 | Status: DISCONTINUED | OUTPATIENT
Start: 2018-06-30 | End: 2018-06-30

## 2018-06-29 RX ORDER — ONDANSETRON 8 MG/1
4 TABLET, FILM COATED ORAL ONCE
Qty: 0 | Refills: 0 | Status: COMPLETED | OUTPATIENT
Start: 2018-06-29 | End: 2018-06-29

## 2018-06-29 RX ORDER — HYDROMORPHONE HYDROCHLORIDE 2 MG/ML
0.5 INJECTION INTRAMUSCULAR; INTRAVENOUS; SUBCUTANEOUS ONCE
Qty: 0 | Refills: 0 | Status: DISCONTINUED | OUTPATIENT
Start: 2018-06-29 | End: 2018-06-29

## 2018-06-29 RX ORDER — POTASSIUM CHLORIDE 20 MEQ
40 PACKET (EA) ORAL ONCE
Qty: 0 | Refills: 0 | Status: COMPLETED | OUTPATIENT
Start: 2018-06-29 | End: 2018-06-29

## 2018-06-29 RX ORDER — MOXIFLOXACIN HYDROCHLORIDE TABLETS, 400 MG 400 MG/1
1 TABLET, FILM COATED ORAL
Qty: 9 | Refills: 0 | OUTPATIENT
Start: 2018-06-29 | End: 2018-07-01

## 2018-06-29 RX ORDER — PIPERACILLIN AND TAZOBACTAM 4; .5 G/20ML; G/20ML
3.38 INJECTION, POWDER, LYOPHILIZED, FOR SOLUTION INTRAVENOUS ONCE
Qty: 0 | Refills: 0 | Status: COMPLETED | OUTPATIENT
Start: 2018-06-29 | End: 2018-06-29

## 2018-06-29 RX ORDER — CEFTRIAXONE 500 MG/1
INJECTION, POWDER, FOR SOLUTION INTRAMUSCULAR; INTRAVENOUS
Qty: 0 | Refills: 0 | Status: DISCONTINUED | OUTPATIENT
Start: 2018-06-29 | End: 2018-06-30

## 2018-06-29 RX ORDER — HEPARIN SODIUM 5000 [USP'U]/ML
5000 INJECTION INTRAVENOUS; SUBCUTANEOUS EVERY 8 HOURS
Qty: 0 | Refills: 0 | Status: DISCONTINUED | OUTPATIENT
Start: 2018-06-29 | End: 2018-07-09

## 2018-06-29 RX ORDER — DEXTROSE 50 % IN WATER 50 %
25 SYRINGE (ML) INTRAVENOUS ONCE
Qty: 0 | Refills: 0 | Status: DISCONTINUED | OUTPATIENT
Start: 2018-06-29 | End: 2018-07-01

## 2018-06-29 RX ORDER — DEXTROSE 50 % IN WATER 50 %
12.5 SYRINGE (ML) INTRAVENOUS ONCE
Qty: 0 | Refills: 0 | Status: DISCONTINUED | OUTPATIENT
Start: 2018-06-29 | End: 2018-07-01

## 2018-06-29 RX ORDER — INSULIN LISPRO 100/ML
VIAL (ML) SUBCUTANEOUS
Qty: 0 | Refills: 0 | Status: DISCONTINUED | OUTPATIENT
Start: 2018-06-29 | End: 2018-07-01

## 2018-06-29 RX ORDER — HYDROMORPHONE HYDROCHLORIDE 2 MG/ML
1 INJECTION INTRAMUSCULAR; INTRAVENOUS; SUBCUTANEOUS ONCE
Qty: 0 | Refills: 0 | Status: DISCONTINUED | OUTPATIENT
Start: 2018-06-29 | End: 2018-06-29

## 2018-06-29 RX ORDER — ASPIRIN/CALCIUM CARB/MAGNESIUM 324 MG
81 TABLET ORAL DAILY
Qty: 0 | Refills: 0 | Status: DISCONTINUED | OUTPATIENT
Start: 2018-06-30 | End: 2018-06-30

## 2018-06-29 RX ORDER — AMLODIPINE BESYLATE 2.5 MG/1
5 TABLET ORAL EVERY 24 HOURS
Qty: 0 | Refills: 0 | Status: DISCONTINUED | OUTPATIENT
Start: 2018-06-29 | End: 2018-06-29

## 2018-06-29 RX ORDER — METRONIDAZOLE 500 MG
500 TABLET ORAL EVERY 8 HOURS
Qty: 0 | Refills: 0 | Status: DISCONTINUED | OUTPATIENT
Start: 2018-06-30 | End: 2018-06-30

## 2018-06-29 RX ORDER — SODIUM CHLORIDE 9 MG/ML
1000 INJECTION INTRAMUSCULAR; INTRAVENOUS; SUBCUTANEOUS
Qty: 0 | Refills: 0 | Status: DISCONTINUED | OUTPATIENT
Start: 2018-06-29 | End: 2018-06-29

## 2018-06-29 RX ORDER — METRONIDAZOLE 500 MG
500 TABLET ORAL ONCE
Qty: 0 | Refills: 0 | Status: COMPLETED | OUTPATIENT
Start: 2018-06-29 | End: 2018-06-29

## 2018-06-29 RX ORDER — SODIUM CHLORIDE 9 MG/ML
1000 INJECTION, SOLUTION INTRAVENOUS
Qty: 0 | Refills: 0 | Status: DISCONTINUED | OUTPATIENT
Start: 2018-06-29 | End: 2018-07-01

## 2018-06-29 RX ORDER — DEXTROSE 50 % IN WATER 50 %
15 SYRINGE (ML) INTRAVENOUS ONCE
Qty: 0 | Refills: 0 | Status: DISCONTINUED | OUTPATIENT
Start: 2018-06-29 | End: 2018-07-01

## 2018-06-29 RX ORDER — SODIUM CHLORIDE 9 MG/ML
1000 INJECTION INTRAMUSCULAR; INTRAVENOUS; SUBCUTANEOUS ONCE
Qty: 0 | Refills: 0 | Status: COMPLETED | OUTPATIENT
Start: 2018-06-29 | End: 2018-06-29

## 2018-06-29 RX ORDER — SIMETHICONE 80 MG/1
40 TABLET, CHEWABLE ORAL
Qty: 30 | Refills: 0 | OUTPATIENT
Start: 2018-06-29 | End: 2018-07-08

## 2018-06-29 RX ORDER — CEFTRIAXONE 500 MG/1
1 INJECTION, POWDER, FOR SOLUTION INTRAMUSCULAR; INTRAVENOUS ONCE
Qty: 0 | Refills: 0 | Status: COMPLETED | OUTPATIENT
Start: 2018-06-29 | End: 2018-06-29

## 2018-06-29 RX ORDER — SODIUM CHLORIDE 9 MG/ML
1000 INJECTION INTRAMUSCULAR; INTRAVENOUS; SUBCUTANEOUS
Qty: 0 | Refills: 0 | Status: DISCONTINUED | OUTPATIENT
Start: 2018-06-29 | End: 2018-06-30

## 2018-06-29 RX ORDER — METRONIDAZOLE 500 MG
1 TABLET ORAL
Qty: 9 | Refills: 0 | OUTPATIENT
Start: 2018-06-29 | End: 2018-07-01

## 2018-06-29 RX ORDER — METRONIDAZOLE 500 MG
500 TABLET ORAL EVERY 8 HOURS
Qty: 0 | Refills: 0 | Status: DISCONTINUED | OUTPATIENT
Start: 2018-06-29 | End: 2018-06-29

## 2018-06-29 RX ORDER — GLUCAGON INJECTION, SOLUTION 0.5 MG/.1ML
1 INJECTION, SOLUTION SUBCUTANEOUS ONCE
Qty: 0 | Refills: 0 | Status: DISCONTINUED | OUTPATIENT
Start: 2018-06-29 | End: 2018-07-01

## 2018-06-29 RX ADMIN — HEPARIN SODIUM 5000 UNIT(S): 5000 INJECTION INTRAVENOUS; SUBCUTANEOUS at 06:35

## 2018-06-29 RX ADMIN — LACTULOSE 20 GRAM(S): 10 SOLUTION ORAL at 09:35

## 2018-06-29 RX ADMIN — AMLODIPINE BESYLATE 5 MILLIGRAM(S): 2.5 TABLET ORAL at 09:35

## 2018-06-29 RX ADMIN — PIPERACILLIN AND TAZOBACTAM 200 GRAM(S): 4; .5 INJECTION, POWDER, LYOPHILIZED, FOR SOLUTION INTRAVENOUS at 20:58

## 2018-06-29 RX ADMIN — SODIUM CHLORIDE 2000 MILLILITER(S): 9 INJECTION INTRAMUSCULAR; INTRAVENOUS; SUBCUTANEOUS at 22:30

## 2018-06-29 RX ADMIN — SIMETHICONE 80 MILLIGRAM(S): 80 TABLET, CHEWABLE ORAL at 11:19

## 2018-06-29 RX ADMIN — LACTULOSE 10 GRAM(S): 10 SOLUTION ORAL at 06:36

## 2018-06-29 RX ADMIN — SODIUM CHLORIDE 2000 MILLILITER(S): 9 INJECTION INTRAMUSCULAR; INTRAVENOUS; SUBCUTANEOUS at 20:48

## 2018-06-29 RX ADMIN — ONDANSETRON 4 MILLIGRAM(S): 8 TABLET, FILM COATED ORAL at 20:48

## 2018-06-29 RX ADMIN — Medication 100 MILLIGRAM(S): at 06:35

## 2018-06-29 RX ADMIN — HYDROMORPHONE HYDROCHLORIDE 0.5 MILLIGRAM(S): 2 INJECTION INTRAMUSCULAR; INTRAVENOUS; SUBCUTANEOUS at 22:58

## 2018-06-29 RX ADMIN — Medication 81 MILLIGRAM(S): at 11:18

## 2018-06-29 RX ADMIN — Medication 40 MILLIEQUIVALENT(S): at 09:06

## 2018-06-29 RX ADMIN — POLYETHYLENE GLYCOL 3350 17 GRAM(S): 17 POWDER, FOR SOLUTION ORAL at 09:06

## 2018-06-29 RX ADMIN — HYDROMORPHONE HYDROCHLORIDE 0.5 MILLIGRAM(S): 2 INJECTION INTRAMUSCULAR; INTRAVENOUS; SUBCUTANEOUS at 00:00

## 2018-06-29 RX ADMIN — SODIUM CHLORIDE 125 MILLILITER(S): 9 INJECTION INTRAMUSCULAR; INTRAVENOUS; SUBCUTANEOUS at 23:06

## 2018-06-29 RX ADMIN — HYDROMORPHONE HYDROCHLORIDE 1 MILLIGRAM(S): 2 INJECTION INTRAMUSCULAR; INTRAVENOUS; SUBCUTANEOUS at 21:50

## 2018-06-29 RX ADMIN — HYDROMORPHONE HYDROCHLORIDE 1 MILLIGRAM(S): 2 INJECTION INTRAMUSCULAR; INTRAVENOUS; SUBCUTANEOUS at 20:45

## 2018-06-29 RX ADMIN — Medication 10 MILLIGRAM(S): at 11:18

## 2018-06-29 NOTE — PROGRESS NOTE ADULT - ASSESSMENT
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 Low concern for ischemic colitis.    Recs:  - f/u UA today  - bowel regimen (gentle laxatives with miralax + dulcolax)  - Abx per primary team, consider switching to PO if going home today  - will discuss with chief and Dr. 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 Ischemic colitis resolving.    Recs:  - f/u UA today  - bowel regimen (gentle laxatives with miralax + dulcolax)  - Abx per primary team, consider switching to PO if going home today  - discussed with chief and Dr. Green

## 2018-06-29 NOTE — ED ADULT NURSE NOTE - SEVERITY
PAIN SCALE 10 OF 10. "I personally performed the services described in the documentation  recorded by the scribe in my presence, and it accurately and completely records my words and action.”

## 2018-06-29 NOTE — PROGRESS NOTE ADULT - SUBJECTIVE AND OBJECTIVE BOX
SUBJECTIVE: lower abd pain improved after BM last night. No N/V/, no fevers.    Pain:   Abd Pain: [ ] YES [ ] NO  Nausea: [ ] YES [ ] NO            Vomiting: [ ] YES [ ] NO  Diarrhea: [ ] YES [ ] NO         Constipation: [ ] YES [ ] NO     Chest Pain: [ ] YES [ ] NO    SOB:  [ ] YES [ ] NO  Flatus: [ ] YES [ ] NO   BM: [ ] YES [ ] NO  Voiding: [ ] YES [ ] NO    Vital Signs Last 24 Hrs  T(C): 36.7 (29 Jun 2018 05:17), Max: 37.5 (28 Jun 2018 15:46)  T(F): 98.1 (29 Jun 2018 05:17), Max: 99.5 (28 Jun 2018 15:46)  HR: 81 (29 Jun 2018 05:17) (75 - 86)  BP: 161/97 (29 Jun 2018 05:17) (139/81 - 173/100)  BP(mean): --  RR: 17 (29 Jun 2018 05:17) (17 - 18)  SpO2: 93% (29 Jun 2018 05:17) (93% - 96%)          Lines/drains/tubes:    I&O's Summary    27 Jun 2018 07:01  -  28 Jun 2018 07:00  --------------------------------------------------------  IN: 1500 mL / OUT: 0 mL / NET: 1500 mL    28 Jun 2018 07:01  -  29 Jun 2018 06:27  --------------------------------------------------------  IN: 625 mL / OUT: 0 mL / NET: 625 mL        LABS:              CAPILLARY BLOOD GLUCOSE            RADIOLOGY & ADDITIONAL STUDIES: SUBJECTIVE: lower abd pain much improved after large BM last night after suppository, +flatus. Tolerating diet. No N/V, no fevers, no dysuria/frequency, no diarrhea, no SOA. Has not been ambulating much. Wants to go home today.    Vital Signs Last 24 Hrs  T(C): 36.7 (29 Jun 2018 05:17), Max: 37.5 (28 Jun 2018 15:46)  T(F): 98.1 (29 Jun 2018 05:17), Max: 99.5 (28 Jun 2018 15:46)  HR: 81 (29 Jun 2018 05:17) (75 - 86)  BP: 161/97 (29 Jun 2018 05:17) (139/81 - 173/100)  BP(mean): --  RR: 17 (29 Jun 2018 05:17) (17 - 18)  SpO2: 93% (29 Jun 2018 05:17) (93% - 96%)    Physical exam:  Gen: NAD  Lungs: normal resp effort, non-labored breathing  Heart: NSR  Abd: soft, mildly distended, lower abdomen mildly TTP, no guarding/rigidity/rebound, + BS x 4    I&O's Summary    27 Jun 2018 07:01  -  28 Jun 2018 07:00  --------------------------------------------------------  IN: 1500 mL / OUT: 0 mL / NET: 1500 mL    28 Jun 2018 07:01  -  29 Jun 2018 06:27  --------------------------------------------------------  IN: 625 mL / OUT: 0 mL / NET: 625 mL    RADIOLOGY & ADDITIONAL STUDIES:  < from: Xray Abdomen 1 View PORTABLE -Urgent (06.28.18 @ 01:17) >    Contrast within portions of colon compatible with recent CT scan. There   is air distention and contrast throughout the colon. No obstruction.   Severe degenerative changes right hip.    < from: Xray Chest 1 View- PORTABLE-Urgent (06.26.18 @ 17:15) >    Impression:    Minimal focal atelectasis/infiltrate in the lung bases.    Blood Culture Results:   No growth at 3 days. (06.25.18 @ 20:13)

## 2018-06-29 NOTE — H&P ADULT - ASSESSMENT
73F w/ CAD and prior colonic volvulus s/p ex lap/colopexy/appendectomy recently admitted with presumed ischemic colitis of the descending colon now presenting with a large bowel obstruction with concern for possible sigmoid stricture vs obstructive mass.    - Will admit to surgery, SICU bed  - Consult GI for bedside decompressive colonoscopy  - Fleet enema  - NPO/IVF  - Will continue same course of ceftriaxone/flagyl as she had had in her recent admission several days prior.  - Trend lactate  - Discussed with Chief on Call and Dr. Green

## 2018-06-29 NOTE — ED PROVIDER NOTE - OBJECTIVE STATEMENT
74 yo female with h/o CAD with stent, h/o meningeoma, h/o endometriosis and hysterectomy, h/o volvulus in the past, s/p recent treatment for colitis (admission  on 06/25/2018- d/c home earlier today) came  back to ER c/o severe abdominal pain and distention. Pt mentioned that after she went home she had a few BMs with loose stool, non-bloody, her abdomen became very distended shortly and pain is unbearable. Pt reports passing flatus. Appears uncomfortable and moaning from pain. Pt denies any difficulty with urination.

## 2018-06-29 NOTE — CONSULT NOTE ADULT - SUBJECTIVE AND OBJECTIVE BOX
HPI: 73F w/ h/o HLD, CAD s/p stent, meningioma s/p resection 8/2017, endometriosis s/p EVERARDO BSO, & h/o colonic volvulus s/p ex lap, colopexy & appendectomy who was recently admitted for descending colitis (concerning for ischemic colitis) responded well to abx and a bowel regimen and discharged earlier today now presents to [facility] ED with worsening constant crampy diffuse abdominal pain and abdominal distention. Of note she does complain of radiation of her abdominal pain through to her back. She also complains of worsening GERD-like symptoms in that she has more acid taste in the back of her mouth however she denies nausea, vomiting, chest pain, dyspnea or SOB.  Last BM was small and non-cathartic. She continues to pass flatus but minimally so. Pt reports last colonoscopy was many years ago, was a traumatic experience and as such never had a repeat scope.     PMHx: HLD, CAD s/p stent, meningioma, endometriosis  PSHx: meningioma resection 8/2017, EVERARDO BSO for endometriosis in her 20s, ex lap w/ colopexy & open appendectomy for colonic volvulus   HOME MEDS: aspirin 81 TIW, Lipitor, Keppra  NKDA  SocHx: Never smoker. No EtOH or drug use.   FHx: No pertinent history in first degree relatives.         ICU Vital Signs Last 24 Hrs  T(F): 98.1 (06-29-18 @ 23:02), Max: 98.4 (06-29-18 @ 08:45)  HR: 98 (06-29-18 @ 23:02) (80 - 106)  BP: 210/96 (06-29-18 @ 23:02) (156/99 - 210/96)  BP(mean): --  ABP: --  RR: 17 (06-29-18 @ 23:02) (16 - 18)  SpO2: 94% (06-29-18 @ 23:02) (93% - 95%)    PHYSICAL EXAM:     Gen: Age appropriate thin female in NAD  HEENT: Dry oral mucosa  CV: RRR  Resp: CTAB with good air movement to b/l bases, no increased work of breathing  Abd: Tensely distended and tympanic, diffuse moderate tenderness without peritoneal findings.  Ext: Cool and dry, no peripheral edema      LABS:    06-29    138  |  98  |  13  ----------------------------<  218<H>  4.0   |  21<L>  |  0.71    Ca    9.6      29 Jun 2018 20:42    TPro  7.5  /  Alb  4.3  /  TBili  0.9  /  DBili  x   /  AST  70<H>  /  ALT  33  /  AlkPhos  86  06-29  LIVER FUNCTIONS - ( 29 Jun 2018 20:42 )  Alb: 4.3 g/dL / Pro: 7.5 g/dL / ALK PHOS: 86 U/L / ALT: 33 U/L / AST: 70 U/L / GGT: x                               14.4   10.8  )-----------( 285      ( 29 Jun 2018 20:42 )             40.6 HPI: 73F w/ h/o HLD, CAD s/p stent, meningioma s/p resection 8/2017, endometriosis s/p EVERARDO BSO, & h/o colonic volvulus s/p ex lap, colopexy & appendectomy who was recently admitted for descending colitis (concerning for ischemic colitis) responded well to abx and a bowel regimen and discharged earlier today now presents to [facility] ED with worsening constant crampy diffuse abdominal pain and abdominal distention. Of note she does complain of radiation of her abdominal pain through to her back. She also complains of worsening GERD-like symptoms in that she has more acid taste in the back of her mouth however she denies nausea, vomiting, chest pain, dyspnea or SOB.  Last BM was small and non-cathartic. She continues to pass flatus but minimally so. Pt reports last colonoscopy was many years ago, was a traumatic experience and as such never had a repeat scope.     PMHx: HLD, CAD s/p stent, meningioma, endometriosis  PSHx: meningioma resection 8/2017, EVERARDO BSO for endometriosis in her 20s, ex lap w/ colopexy & open appendectomy for colonic volvulus   HOME MEDS: aspirin 81 TIW, Lipitor, Keppra  NKDA  SocHx: Never smoker. No EtOH or drug use.   FHx: No pertinent history in first degree relatives.     SICU Addendum: General surgery consulted for SICU admission for emergent bedside decompressive colonoscopy in a monitored unit.        ICU Vital Signs Last 24 Hrs  T(F): 98.1 (06-29-18 @ 23:02), Max: 98.4 (06-29-18 @ 08:45)  HR: 98 (06-29-18 @ 23:02) (80 - 106)  BP: 210/96 (06-29-18 @ 23:02) (156/99 - 210/96)  BP(mean): --  ABP: --  RR: 17 (06-29-18 @ 23:02) (16 - 18)  SpO2: 94% (06-29-18 @ 23:02) (93% - 95%)    PHYSICAL EXAM:     Gen: Age appropriate thin female in NAD  HEENT: Dry oral mucosa  CV: RRR  Resp: CTAB with good air movement to b/l bases, no increased work of breathing  Abd: Tensely distended and tympanic, diffuse moderate tenderness without peritoneal findings.  Ext: Cool and dry, no peripheral edema      LABS:    06-29    138  |  98  |  13  ----------------------------<  218<H>  4.0   |  21<L>  |  0.71    Ca    9.6      29 Jun 2018 20:42    TPro  7.5  /  Alb  4.3  /  TBili  0.9  /  DBili  x   /  AST  70<H>  /  ALT  33  /  AlkPhos  86  06-29  LIVER FUNCTIONS - ( 29 Jun 2018 20:42 )  Alb: 4.3 g/dL / Pro: 7.5 g/dL / ALK PHOS: 86 U/L / ALT: 33 U/L / AST: 70 U/L / GGT: x                               14.4   10.8  )-----------( 285      ( 29 Jun 2018 20:42 )             40.6

## 2018-06-29 NOTE — ED PROVIDER NOTE - ATTENDING CONTRIBUTION TO CARE
73 yof pw abd pain, recent dx of colitis, w/ possible ischemic colitis, discharged today, returns for worsening sx.     agree w/ PA, tender abd, concern for worsening colitis/ischemia, lactate elevated, pt taken to CT, surg consulted

## 2018-06-29 NOTE — CONSULT NOTE ADULT - ATTENDING COMMENTS
Pt was seen and examined. Agree with the above. Will proceed with colonoscopy
colonic ileus, colitis, volume depletion with lactic acidosis  treating with abx for colitis  IVF hydration, monitor serial lactate  consider treatment of HTN after patient's discomfort has been alleviated  plan for decompressive coonoscopy by GI

## 2018-06-29 NOTE — H&P ADULT - NSHPPHYSICALEXAM_GEN_ALL_CORE
Vital Signs Last 24 Hrs  T(C): 36.7 (29 Jun 2018 23:02), Max: 36.9 (29 Jun 2018 08:45)  T(F): 98.1 (29 Jun 2018 23:02), Max: 98.4 (29 Jun 2018 08:45)  HR: 98 (29 Jun 2018 23:02) (80 - 106)  BP: 210/96 (29 Jun 2018 23:02) (156/99 - 210/96)  BP(mean): --  RR: 17 (29 Jun 2018 23:02) (16 - 18)  SpO2: 94% (29 Jun 2018 23:02) (93% - 95%)    Gen: Age appropriate thin female in NAD  HEENT: Dry oral mucosa  CV: RRR  Resp: CTAB with good air movement to b/l bases, no increased work of breathing  Abd: Tensely distended and tympanic, diffuse moderate tenderness without peritoneal findings.  Ext: Cool and dry, no peripheral edema Vital Signs Last 24 Hrs  T(C): 36.7 (29 Jun 2018 23:02), Max: 36.9 (29 Jun 2018 08:45)  T(F): 98.1 (29 Jun 2018 23:02), Max: 98.4 (29 Jun 2018 08:45)  HR: 98 (29 Jun 2018 23:02) (80 - 106)  BP: 210/96 (29 Jun 2018 23:02) (156/99 - 210/96)  BP(mean): --  RR: 17 (29 Jun 2018 23:02) (16 - 18)  SpO2: 94% (29 Jun 2018 23:02) (93% - 95%)    Gen: Age appropriate thin female in NAD  HEENT: Dry oral mucosa  CV: RRR  Resp: CTAB with good air movement to b/l bases, no increased work of breathing  Abd: Tensely distended and tympanic, diffuse moderate tenderness without peritoneal findings.  Ext: Cool and dry, no peripheral edema  Rectal: No masses palpated or release of flatus on ISSA

## 2018-06-29 NOTE — ED PROVIDER NOTE - PROGRESS NOTE DETAILS
CT scan done, surgery paged. pending surgical eval. CT scan done, surgery paged and case discussed, pending surgical eval. case discussed with , who agrees that pt will need surgical admission for further management.

## 2018-06-29 NOTE — CONSULT NOTE ADULT - SUBJECTIVE AND OBJECTIVE BOX
HPI:  72 YO F h/o HLD, CAD s/p stent, meningioma s/p resection 8/2017, endometriosis s/p EVERARDO BSO, & h/o colonic volvulus s/p ex lap, colopexy & appendectomy who was recently admitted for descending colitis (concerning for ischemic colitis) responded well to abx and a bowel regimen and discharged earlier today now presents to [facility] ED with worsening constant crampy diffuse abdominal pain and abdominal distention. Of note she does complain of radiation of her abdominal pain through to her back. She also complains of worsening GERD-like symptoms in that she has more acid taste in the back of her mouth however she denies nausea, vomiting, chest pain, dyspnea, SOB, melena, hematochezia, diarrhea.  Last BM was small and non-cathartic. She continues to pass flatus but minimally so. Pt reports last colonoscopy was many years ago, was a traumatic experience and as such never had a repeat scope.     Allergies    No Known Allergies    Home Medications:  aspirin 81 mg oral tablet: 1 tab(s) orally three times per week  atorvastatin: 40 milligram(s) orally once (at bedtime) (25 Jun 2018 23:43)    MEDICATIONS:  MEDICATIONS  (STANDING):  cefTRIAXone   IVPB 1 Gram(s) IV Intermittent once  cefTRIAXone   IVPB      dextrose 5%. 1000 milliLiter(s) (50 mL/Hr) IV Continuous <Continuous>  dextrose 50% Injectable 12.5 Gram(s) IV Push once  dextrose 50% Injectable 25 Gram(s) IV Push once  dextrose 50% Injectable 25 Gram(s) IV Push once  heparin  Injectable 5000 Unit(s) SubCutaneous every 8 hours  insulin lispro (HumaLOG) corrective regimen sliding scale   SubCutaneous Before meals and at bedtime  metroNIDAZOLE  IVPB      metroNIDAZOLE  IVPB 500 milliGRAM(s) IV Intermittent once  sodium biphosphate Rectal Enema 1 Enema Rectal once  sodium chloride 0.9%. 1000 milliLiter(s) (80 mL/Hr) IV Continuous <Continuous>    MEDICATIONS  (PRN):  dextrose 40% Gel 15 Gram(s) Oral once PRN Blood Glucose LESS THAN 70 milliGRAM(s)/deciliter  glucagon  Injectable 1 milliGRAM(s) IntraMuscular once PRN Glucose LESS THAN 70 milligrams/deciliter    PAST MEDICAL & SURGICAL HISTORY:  Lyme disease  CAD (coronary artery disease)  Meningioma  High cholesterol  H/O meningioma of the brain  S/P EVERARDO (total abdominal hysterectomy)    FAMILY HISTORY:  Family history of chronic ischemic heart disease (Mother, Sibling)  No significant family history of CRC, gastric CA, or liver disease    SOCIAL HISTORY:  [place]: Denies  Alcohol: Denies  Illicit Drugs: Denies    REVIEW OF SYSTEMS: per HPI    Vital Signs Last 24 Hrs  T(C): 36.7 (29 Jun 2018 23:02), Max: 36.9 (29 Jun 2018 08:45)  T(F): 98.1 (29 Jun 2018 23:02), Max: 98.4 (29 Jun 2018 08:45)  HR: 98 (29 Jun 2018 23:02) (80 - 106)  BP: 210/96 (29 Jun 2018 23:02) (156/99 - 210/96)  BP(mean): --  RR: 17 (29 Jun 2018 23:02) (16 - 18)  SpO2: 94% (29 Jun 2018 23:02) (93% - 95%)    PHYSICAL EXAM:    General: Well developed; well nourished; in no acute distress  HEENT: MMM, conjunctiva and sclera clear  Gastrointestinal: Tense, diffuse TTP, distended; Tympanic to percussion Normal bowel sounds; No rebound or guarding  Extremities: Normal range of motion, No clubbing, cyanosis or edema  Neurological: Alert and oriented x3  Skin: Warm and dry. No obvious rash    LABS:                        14.4   10.8  )-----------( 285      ( 29 Jun 2018 20:42 )             40.6     06-29    138  |  98  |  13  ----------------------------<  218<H>  4.0   |  21<L>  |  0.71    Ca    9.6      29 Jun 2018 20:42    TPro  7.5  /  Alb  4.3  /  TBili  0.9  /  DBili  x   /  AST  70<H>  /  ALT  33  /  AlkPhos  86  06-29    Lactate, Blood: 2.5 mmoL/L (06.29.18 @ 23:21)    RADIOLOGY & ADDITIONAL STUDIES:     CT Abdomen and Pelvis w/ IV Cont (06.29.18 @ 21:10)  FINDINGS:    Lower chest: New right greater than left bibasilar atelectasis. Severe   coronary artery calcifications.    Liver: Smooth in contour. Several stable subcentimeter hypodensities are   too small to characterize. Portal and hepatic veins are patent.    Biliary system: Gallbladder is normal in size. No calcified gallstones.   No biliary ductal dilatation.    Pancreas: 1.0 x 1.5 x 1.5 cm (ap x trv x cc) hyperenhancing lesion in the   posterior pancreatic tail is more conspicuous on the current exam. No   duct dilatation.    Spleen: Unremarkable.    Adrenal glands: Unremarkable.    Kidneys: Symmetric parenchymal enhancement. No renal mass. No   hydronephrosis. 7 mm left upper pole stone, unchanged.  Urinary Bladder: Decompressed.    Reproductive organs: Status post hysterectomy.     Bowel/Peritoneum: Increased severe wall thickening of an approximately 7   cm segment of sigmoid colon with significant narrowing of the lumen.   Proximally, the colon is severely dilated and predominantly gas-filled,   measuring up to 11.5 cm, which has worsened since the prior examination.   There are also multiple fluid-filled small bowel loops which are upper   normal caliber. Moderate hiatal hernia, mildly increased in size. Fluid   is seen refluxing into the distal esophagus. New trace ascites seen   predominantly in the right hemiabdomen. No pneumoperitoneum.    Lymph nodes: No lymphadenopathy.    Aorta/IVC: Normal caliber. Moderate aortoiliac atherosclerosis.   Circumaortic left renal vein, a normal variant.    Bones/Soft tissues: Moderate degenerative changes of the spine.        IMPRESSION:     1.  Since 6/25/2018, there is a new large bowel obstruction secondary to   an approximately 7 cm longsegment of increased wall thickening of the   sigmoid colon resulting in increased marked luminal narrowing. Dilated   colon measures up to 11.5 cm in diameter. Findings are suggestive of   stricture. No pneumatosis or pneumoperitoneum. Recommend correlation with   colonoscopy after large bowel decompression. Discussed with CICI Collier   on 6/29/2018 at 9:30 PM.  2.  Hyperenhancing lesion in the pancreatic tail measures up to 1.5 cm   and is more conspicuous on the current study. This is likely related to   an intrapancreatic splenule, although a neuroendocrine tumor cannot be   excluded with certainty. Comparison with prior outside imaging is   recommended. Alternately, nonemergent evaluation with pancreas protocol   MRI can be performed.

## 2018-06-29 NOTE — ED PROVIDER NOTE - CRITICAL CARE PROVIDED
consultation with other physicians/direct patient care (not related to procedure)/additional history taking/documentation/interpretation of diagnostic studies

## 2018-06-29 NOTE — ED ADULT NURSE NOTE - OBJECTIVE STATEMENT
The patient is a 72 y/o female who came in c/o abdominal pain, Pt discharged today from [facility] inpatient for diagnosis of colitis. Pt is moaning, noted to have abdomen significantly distended. Sepsis work up done, type and screen done and sent to lab, medicated and taken to CT scan STAT. Pt is aox4.

## 2018-06-29 NOTE — H&P ADULT - ATTENDING COMMENTS
Patient known from admission earlier this week.  Course consistent with ischemic colitis with clinical improvement.    However readmitted with acute distention today.  Abdomen very firm and distended.  Not particularly tender  Just had attempted sigmoidoscopy; stricture in sigmoid colon, unable to pass scope.  No stool coming through stricture.  CT demonstrates acute dilation of colon with long stricture in sigmoid colon    A/P:  Suspect stricture of sigmoid colon due to diverticulitis, possibly cancer.  1. OR for colectomy, colostomy.  Risks of bleeding, infection, pulmonary complications discussed. Alternative of non-operative management not recommended.  2. IV hydration Patient known from admission earlier this week.  Course consistent with ischemic colitis with clinical improvement.    However readmitted with acute distention today.  Abdomen very firm and distended.  Not particularly tender  Just had attempted sigmoidoscopy; stricture in sigmoid colon, unable to pass scope.  No stool coming through stricture.  CT demonstrates acute dilation of colon with long stricture in sigmoid colon    A/P:  Suspect stricture of sigmoid colon due to diverticulitis, possibly cancer.  1. OR for colectomy, colostomy.  Risks of bleeding, infection, pulmonary complications discussed. Alternative of non-operative management not recommended. She agrees to proceed.  2. IV hydration

## 2018-06-29 NOTE — PROGRESS NOTE ADULT - ATTENDING COMMENTS
Patient seen at 8AM  Slightly more distended than yesterday.  Less abdominal pain. Passing flatus. No BM  Abd soft, but not tender.    AXR with contrast through proximal colon up to splenic flexure. Air in distal colon    Imp: Suspect ileus, could be due to ischemic colitis vs other source such as UTI  Rec:  1. check Urine  2. Gentle laxative such as miralax or dulcolax.   3. If decompresses can go home with po antibiotics. If worsens will transfer to my service.
Seen and examined with Dr. Eduardo.  Improving clinically except for fever last night which I cannot attribute to her symptomatically improving colitis.  Would perform fever workup and continue antibiotics.  OK to advance diet.
Feels better after large bowel movements.    Abd soft, mild dist, minimal tenderness on left side.  No further fevers.    Imp: Ischemic colitis, resolving  Rec: 1. ok to d/c home with po antibiotics x 7 more days.  2. Bowel regimen with Miralax daily, dulcolax prn  3. f/u with me in office 1-2 weeks.
Patient seen and examined with house-staff during bedside rounds.  Resident note read, including vitals, physical findings, laboratory data, and radiological reports.   Revisions included below.  Direct personal management at bed side and extensive interpretation of the data.  Plan was outlined and discussed in details with the housestaff.  Decision making of high complexity  Action taken for acute disease activity to reflect the level of care provided:  - medication reconciliation  - review laboratory data  -Patient improved but the abdominal exam revealed tender and the left lower quadrant compared to yesterday. The cultures are negative. The patient is still unclear as. Continue IV hydration and antibiotic. Monitored abdominal condition.    The patient was seen later in the day. The abdominal exam was more consistent with distention possible ileus. I ordered abdominal chest x-ray and further recommendation will follow
Patient seen and examined with house-staff during bedside rounds.  Resident note read, including vitals, physical findings, laboratory data, and radiological reports.   Revisions included below.  Direct personal management at bed side and extensive interpretation of the data.  Plan was outlined and discussed in details with the housestaff.  Decision making of high complexity  Action taken for acute disease activity to reflect the level of care provided:  - medication reconciliation  - review laboratory data  The patient was seen unexamined. I discussed the case multiple times with Dr. Green.  The patient still have barium and picture most consistent with ileus. I prescribed laxative the patient and will follow. Continue antibiotic on IV hydration. Encourage increase ambulation. Replace potassium. The blood cultures are negative today
Patient seen and examined with house-staff during bedside rounds.  Resident note read, including vitals, physical findings, laboratory data, and radiological reports.   Revisions included below.  Direct personal management at bed side and extensive interpretation of the data.  Plan was outlined and discussed in details with the housestaff.  Decision making of high complexity  Action taken for acute disease activity to reflect the level of care provided:  - medication reconciliation  - review laboratory data  -The patient improved since yesterday. The abdominal tenderness decrease. Patient responded to the antibiotic. The patient is also responded to IV hydration. I discussed the case with: surgery and colonoscopy is to be performed as an outpatient. Advanced guide as tolerated and observe. Blood cultures are negative. Patient is hemodynamically stable.

## 2018-06-29 NOTE — H&P ADULT - HISTORY OF PRESENT ILLNESS
73F w/ h/o HLD, CAD s/p stent, meningioma s/p resection 8/2017, endometriosis s/p EVERARDO BSO, & h/o colonic volvulus s/p ex lap, colopexy & appendectomy who was recently admitted for descending colitis (concerning for ischemic colitis) responded well to abx and a bowel regimen and discharged earlier today now presents to [facility] ED with worsening constant crampy diffuse abdominal pain and abdominal distention. Of note she does complain of radiation of her abdominal pain through to her back. She also complains of worsening GERD-like symptoms in that she has more acid taste in the back of her mouth however she denies nausea, vomiting, chest pain, dyspnea or SOB.  Last BM was small and non-cathartic. She continues to pass flatus but minimally so. Pt reports last colonoscopy was many years ago, was a traumatic experience and as such never had a repeat scope.     PMHx: HLD, CAD s/p stent, meningioma, endometriosis  PSHx: meningioma resection 8/2017, EVERARDO BSO for endometriosis in her 20s, ex lap w/ colopexy & open appendectomy for colonic volvulus   HOME MEDS: aspirin 81 TIW, Lipitor, Keppra  NKDA  SocHx: Never smoker. No EtOH or drug use.   FHx: No pertinent history in first degree relatives.

## 2018-06-29 NOTE — CONSULT NOTE ADULT - ASSESSMENT
74 YO F h/o HLD, CAD s/p stent, meningioma s/p resection 8/2017, endometriosis s/p EVERARDO BSO, & h/o colonic volvulus s/p ex lap, colopexy & appendectomy who was recently admitted for descending colitis (concerning for ischemic colitis) responded well to abx and a bowel regimen and discharged earlier today now presents to [facility] ED with worsening constant crampy diffuse abdominal pain and abdominal distention found to have sigmoid stricture with large bowel obstruction.     # Large bowel obstruction  - Risks, benefits, alternatives, and indications including perforation, bleeding, infection have been discussed with the patient. Pt is amenable to proceed  - Will plan for emergent unprepped colonoscopy with colonic decompression d/t concern per surgery for potential impending vascular compromise  - NPO  - Further plan pending colonoscopy    # Pancreatic tail lesion  - Recommend non-urgent MRI pancreatic mass protocol    Case d/w Dr. Marshall  GI will follow
73F w/ CAD and prior colonic volvulus s/p ex lap/colopexy/appendectomy recently admitted with presumed ischemic colitis of the descending colon now presenting with a large bowel obstruction with concern for possible sigmoid stricture vs obstructive mass.    Neuro: Tylenol/Dilaudid  CV: HD stable NS@80  Pulm: Satting well on RA  GI: NPO,s/p fleet, Decompression to be done by GI   : Voids  ID: ischemic colitis: Ceftriaxone/Flagyl (6/25--)  Endo: ISS  PPx: SCD  Lines: PIV

## 2018-06-29 NOTE — H&P ADULT - NSHPLABSRESULTS_GEN_ALL_CORE
14.4   10.8  )-----------( 285      ( 29 Jun 2018 20:42 )             40.6   06-29    138  |  98  |  13  ----------------------------<  218<H>  4.0   |  21<L>  |  0.71    Ca    9.6      29 Jun 2018 20:42    TPro  7.5  /  Alb  4.3  /  TBili  0.9  /  DBili  x   /  AST  70<H>  /  ALT  33  /  AlkPhos  86  06-29    < from: CT Abdomen and Pelvis w/ IV Cont (06.29.18 @ 21:10) >    IMPRESSION:     1.  Since 6/25/2018, there is a new large bowel obstruction secondary to   an approximately 7 cm longsegment of increased wall thickening of the   sigmoid colon resulting in increased marked luminal narrowing. Dilated   colon measures up to 11.5 cm in diameter. Findings are suggestive of   stricture. No pneumatosis or pneumoperitoneum. Recommend correlation with   colonoscopy after large bowel decompression. Discussed with CICI Collier   on 6/29/2018 at 9:30 PM.  2.  Hyperenhancing lesion in the pancreatic tail measures up to 1.5 cm   and is more conspicuous on the current study. This is likely related to   an intrapancreatic splenule, although a neuroendocrine tumor cannot be   excluded with certainty. Comparison with prior outside imaging is   recommended. Alternately, nonemergent evaluation with pancreas protocol   MRI can be performed.     < end of copied text >

## 2018-06-30 PROBLEM — E78.00 PURE HYPERCHOLESTEROLEMIA, UNSPECIFIED: Chronic | Status: ACTIVE | Noted: 2018-06-25

## 2018-06-30 LAB
ALBUMIN SERPL ELPH-MCNC: 2.9 G/DL — LOW (ref 3.3–5)
ALP SERPL-CCNC: 55 U/L — SIGNIFICANT CHANGE UP (ref 40–120)
ALT FLD-CCNC: 36 U/L — SIGNIFICANT CHANGE UP (ref 10–45)
ANION GAP SERPL CALC-SCNC: 10 MMOL/L — SIGNIFICANT CHANGE UP (ref 5–17)
APTT BLD: 27.4 SEC — LOW (ref 27.5–37.4)
AST SERPL-CCNC: 73 U/L — HIGH (ref 10–40)
BILIRUB SERPL-MCNC: 0.6 MG/DL — SIGNIFICANT CHANGE UP (ref 0.2–1.2)
BLD GP AB SCN SERPL QL: NEGATIVE — SIGNIFICANT CHANGE UP
BUN SERPL-MCNC: 11 MG/DL — SIGNIFICANT CHANGE UP (ref 7–23)
CALCIUM SERPL-MCNC: 8.3 MG/DL — LOW (ref 8.4–10.5)
CHLORIDE SERPL-SCNC: 104 MMOL/L — SIGNIFICANT CHANGE UP (ref 96–108)
CO2 SERPL-SCNC: 23 MMOL/L — SIGNIFICANT CHANGE UP (ref 22–31)
CREAT SERPL-MCNC: 0.48 MG/DL — LOW (ref 0.5–1.3)
CULTURE RESULTS: SIGNIFICANT CHANGE UP
CULTURE RESULTS: SIGNIFICANT CHANGE UP
GLUCOSE BLDC GLUCOMTR-MCNC: 119 MG/DL — HIGH (ref 70–99)
GLUCOSE BLDC GLUCOMTR-MCNC: 149 MG/DL — HIGH (ref 70–99)
GLUCOSE SERPL-MCNC: 149 MG/DL — HIGH (ref 70–99)
HCT VFR BLD CALC: 36.5 % — SIGNIFICANT CHANGE UP (ref 34.5–45)
HGB BLD-MCNC: 12.3 G/DL — SIGNIFICANT CHANGE UP (ref 11.5–15.5)
INR BLD: 0.97 — SIGNIFICANT CHANGE UP (ref 0.88–1.16)
MAGNESIUM SERPL-MCNC: 1.7 MG/DL — SIGNIFICANT CHANGE UP (ref 1.6–2.6)
MCHC RBC-ENTMCNC: 30.2 PG — SIGNIFICANT CHANGE UP (ref 27–34)
MCHC RBC-ENTMCNC: 33.7 G/DL — SIGNIFICANT CHANGE UP (ref 32–36)
MCV RBC AUTO: 89.7 FL — SIGNIFICANT CHANGE UP (ref 80–100)
PHOSPHATE SERPL-MCNC: 3.2 MG/DL — SIGNIFICANT CHANGE UP (ref 2.5–4.5)
PLATELET # BLD AUTO: 220 K/UL — SIGNIFICANT CHANGE UP (ref 150–400)
POTASSIUM SERPL-MCNC: 4 MMOL/L — SIGNIFICANT CHANGE UP (ref 3.5–5.3)
POTASSIUM SERPL-SCNC: 4 MMOL/L — SIGNIFICANT CHANGE UP (ref 3.5–5.3)
PROT SERPL-MCNC: 4.9 G/DL — LOW (ref 6–8.3)
PROTHROM AB SERPL-ACNC: 10.8 SEC — SIGNIFICANT CHANGE UP (ref 9.8–12.7)
RBC # BLD: 4.07 M/UL — SIGNIFICANT CHANGE UP (ref 3.8–5.2)
RBC # FLD: 14 % — SIGNIFICANT CHANGE UP (ref 10.3–16.9)
RH IG SCN BLD-IMP: POSITIVE — SIGNIFICANT CHANGE UP
SODIUM SERPL-SCNC: 137 MMOL/L — SIGNIFICANT CHANGE UP (ref 135–145)
SPECIMEN SOURCE: SIGNIFICANT CHANGE UP
SPECIMEN SOURCE: SIGNIFICANT CHANGE UP
WBC # BLD: 5.6 K/UL — SIGNIFICANT CHANGE UP (ref 3.8–10.5)
WBC # FLD AUTO: 5.6 K/UL — SIGNIFICANT CHANGE UP (ref 3.8–10.5)

## 2018-06-30 PROCEDURE — 99222 1ST HOSP IP/OBS MODERATE 55: CPT | Mod: GC

## 2018-06-30 RX ORDER — MIDAZOLAM HYDROCHLORIDE 1 MG/ML
6 INJECTION, SOLUTION INTRAMUSCULAR; INTRAVENOUS ONCE
Qty: 0 | Refills: 0 | Status: DISCONTINUED | OUTPATIENT
Start: 2018-06-30 | End: 2018-06-30

## 2018-06-30 RX ORDER — ACETAMINOPHEN 500 MG
1000 TABLET ORAL ONCE
Qty: 0 | Refills: 0 | Status: COMPLETED | OUTPATIENT
Start: 2018-06-30 | End: 2018-06-30

## 2018-06-30 RX ORDER — MIDAZOLAM HYDROCHLORIDE 1 MG/ML
2 INJECTION, SOLUTION INTRAMUSCULAR; INTRAVENOUS ONCE
Qty: 0 | Refills: 0 | Status: DISCONTINUED | OUTPATIENT
Start: 2018-06-30 | End: 2018-06-30

## 2018-06-30 RX ORDER — HYDROMORPHONE HYDROCHLORIDE 2 MG/ML
1 INJECTION INTRAMUSCULAR; INTRAVENOUS; SUBCUTANEOUS EVERY 4 HOURS
Qty: 0 | Refills: 0 | Status: DISCONTINUED | OUTPATIENT
Start: 2018-06-30 | End: 2018-07-02

## 2018-06-30 RX ORDER — FENTANYL CITRATE 50 UG/ML
100 INJECTION INTRAVENOUS ONCE
Qty: 0 | Refills: 0 | Status: DISCONTINUED | OUTPATIENT
Start: 2018-06-30 | End: 2018-06-30

## 2018-06-30 RX ORDER — LEVETIRACETAM 250 MG/1
500 TABLET, FILM COATED ORAL EVERY 12 HOURS
Qty: 0 | Refills: 0 | Status: DISCONTINUED | OUTPATIENT
Start: 2018-06-30 | End: 2018-07-09

## 2018-06-30 RX ORDER — HYDROMORPHONE HYDROCHLORIDE 2 MG/ML
0.5 INJECTION INTRAMUSCULAR; INTRAVENOUS; SUBCUTANEOUS EVERY 4 HOURS
Qty: 0 | Refills: 0 | Status: DISCONTINUED | OUTPATIENT
Start: 2018-06-30 | End: 2018-07-02

## 2018-06-30 RX ORDER — SODIUM CHLORIDE 9 MG/ML
1000 INJECTION INTRAMUSCULAR; INTRAVENOUS; SUBCUTANEOUS
Qty: 0 | Refills: 0 | Status: DISCONTINUED | OUTPATIENT
Start: 2018-06-30 | End: 2018-07-01

## 2018-06-30 RX ORDER — HYDROMORPHONE HYDROCHLORIDE 2 MG/ML
1 INJECTION INTRAMUSCULAR; INTRAVENOUS; SUBCUTANEOUS ONCE
Qty: 0 | Refills: 0 | Status: DISCONTINUED | OUTPATIENT
Start: 2018-06-30 | End: 2018-06-30

## 2018-06-30 RX ADMIN — Medication 400 MILLIGRAM(S): at 00:23

## 2018-06-30 RX ADMIN — FENTANYL CITRATE 100 MICROGRAM(S): 50 INJECTION INTRAVENOUS at 01:59

## 2018-06-30 RX ADMIN — HYDROMORPHONE HYDROCHLORIDE 0.5 MILLIGRAM(S): 2 INJECTION INTRAMUSCULAR; INTRAVENOUS; SUBCUTANEOUS at 22:30

## 2018-06-30 RX ADMIN — HYDROMORPHONE HYDROCHLORIDE 1 MILLIGRAM(S): 2 INJECTION INTRAMUSCULAR; INTRAVENOUS; SUBCUTANEOUS at 16:45

## 2018-06-30 RX ADMIN — HYDROMORPHONE HYDROCHLORIDE 1 MILLIGRAM(S): 2 INJECTION INTRAMUSCULAR; INTRAVENOUS; SUBCUTANEOUS at 06:24

## 2018-06-30 RX ADMIN — FENTANYL CITRATE 100 MICROGRAM(S): 50 INJECTION INTRAVENOUS at 01:35

## 2018-06-30 RX ADMIN — Medication 1000 MILLIGRAM(S): at 01:34

## 2018-06-30 RX ADMIN — HYDROMORPHONE HYDROCHLORIDE 1 MILLIGRAM(S): 2 INJECTION INTRAMUSCULAR; INTRAVENOUS; SUBCUTANEOUS at 17:07

## 2018-06-30 RX ADMIN — SODIUM CHLORIDE 80 MILLILITER(S): 9 INJECTION INTRAMUSCULAR; INTRAVENOUS; SUBCUTANEOUS at 01:35

## 2018-06-30 RX ADMIN — MIDAZOLAM HYDROCHLORIDE 6 MILLIGRAM(S): 1 INJECTION, SOLUTION INTRAMUSCULAR; INTRAVENOUS at 01:35

## 2018-06-30 RX ADMIN — CEFTRIAXONE 100 GRAM(S): 500 INJECTION, POWDER, FOR SOLUTION INTRAMUSCULAR; INTRAVENOUS at 05:33

## 2018-06-30 RX ADMIN — Medication 100 MILLIGRAM(S): at 06:04

## 2018-06-30 RX ADMIN — HEPARIN SODIUM 5000 UNIT(S): 5000 INJECTION INTRAVENOUS; SUBCUTANEOUS at 14:15

## 2018-06-30 RX ADMIN — MIDAZOLAM HYDROCHLORIDE 2 MILLIGRAM(S): 1 INJECTION, SOLUTION INTRAMUSCULAR; INTRAVENOUS at 03:04

## 2018-06-30 RX ADMIN — HYDROMORPHONE HYDROCHLORIDE 0.5 MILLIGRAM(S): 2 INJECTION INTRAMUSCULAR; INTRAVENOUS; SUBCUTANEOUS at 22:13

## 2018-06-30 RX ADMIN — HEPARIN SODIUM 5000 UNIT(S): 5000 INJECTION INTRAVENOUS; SUBCUTANEOUS at 05:32

## 2018-06-30 RX ADMIN — HEPARIN SODIUM 5000 UNIT(S): 5000 INJECTION INTRAVENOUS; SUBCUTANEOUS at 22:12

## 2018-06-30 NOTE — PROGRESS NOTE ADULT - SUBJECTIVE AND OBJECTIVE BOX
Pt seen and examined at bedside. s/p sigmoidoscopy with inability to pass the scope past 25 cm from the anal verge d/t likely diverticular stricture. Pt was taken to the OR overnight for Ex-lap, CONNIE, colonic decompression, and loop sigmoidostomy. Pt lethargic in AM, but arousable, reports mild abdominal pain.     Allergies  No Known Allergies    MEDICATIONS:  MEDICATIONS  (STANDING):  dextrose 5%. 1000 milliLiter(s) (50 mL/Hr) IV Continuous <Continuous>  dextrose 50% Injectable 12.5 Gram(s) IV Push once  dextrose 50% Injectable 25 Gram(s) IV Push once  dextrose 50% Injectable 25 Gram(s) IV Push once  heparin  Injectable 5000 Unit(s) SubCutaneous every 8 hours  insulin lispro (HumaLOG) corrective regimen sliding scale   SubCutaneous Before meals and at bedtime  sodium chloride 0.9%. 1000 milliLiter(s) (100 mL/Hr) IV Continuous <Continuous>    MEDICATIONS  (PRN):  dextrose 40% Gel 15 Gram(s) Oral once PRN Blood Glucose LESS THAN 70 milliGRAM(s)/deciliter  glucagon  Injectable 1 milliGRAM(s) IntraMuscular once PRN Glucose LESS THAN 70 milligrams/deciliter    Vital Signs Last 24 Hrs  T(C): 37.1 (30 Jun 2018 06:54), Max: 37.1 (30 Jun 2018 06:54)  T(F): 98.8 (30 Jun 2018 06:54), Max: 98.8 (30 Jun 2018 06:54)  HR: 62 (30 Jun 2018 12:30) (60 - 112)  BP: 126/71 (30 Jun 2018 11:30) (126/71 - 215/118)  BP(mean): 95 (30 Jun 2018 11:30) (83 - 149)  RR: 12 (30 Jun 2018 12:30) (12 - 100)  SpO2: 97% (30 Jun 2018 12:30) (93% - 97%)    06-29 @ 07:01 - 06-30 @ 07:00  --------------------------------------------------------  IN: 760 mL / OUT: 400 mL / NET: 360 mL    06-30 @ 07:01 - 06-30 @ 13:22  --------------------------------------------------------  IN: 80 mL / OUT: 30 mL / NET: 50 mL    PHYSICAL EXAM:    General: Well developed; well nourished; in no acute distress, lethargic but arousable  HEENT: MMM, conjunctiva and sclera clear  Gastrointestinal: Soft mild diffuse TTP mildly distended;  No hepatosplenomegaly. No rebound or guarding; stoma pink  Skin: Warm and dry. No obvious rash    LABS:                        12.3   5.6   )-----------( 220      ( 30 Jun 2018 11:53 )             36.5     06-30    137  |  104  |  11  ----------------------------<  149<H>  4.0   |  23  |  0.48<L>    Ca    8.3<L>      30 Jun 2018 11:53  Phos  3.2     06-30  Mg     1.7     06-30    TPro  4.9<L>  /  Alb  2.9<L>  /  TBili  0.6  /  DBili  x   /  AST  73<H>  /  ALT  36  /  AlkPhos  55  06-30    PT/INR - ( 30 Jun 2018 03:30 )   PT: 10.8 sec;   INR: 0.97       PTT - ( 30 Jun 2018 03:30 )  PTT:27.4 sec    Culture - Blood (collected 30 Jun 2018 00:52)  Source: .Blood Blood  Preliminary Report (30 Jun 2018 13:01):    No growth at 12 hours    Culture - Blood (collected 30 Jun 2018 00:52)  Source: .Blood Blood  Preliminary Report (30 Jun 2018 13:01):    No growth at 12 hours    RADIOLOGY & ADDITIONAL STUDIES: No new radiologic studies.

## 2018-06-30 NOTE — BRIEF OPERATIVE NOTE - OPERATION/FINDINGS
Exploratory laparotomy revealed severely dilated colon from the cecum to the sigmoid colon where the area of suspected diverticular stricture is located. Lysis of adhesions performed. Small bowel serosal tear repaired. Colotomy made in transverse colon for decompression, mostly gas. Loop sigmoid colostomy created.

## 2018-06-30 NOTE — BRIEF OPERATIVE NOTE - POST-OP DX
Diverticular stricture  06/30/2018    Active  Mary Anne Hall  Large bowel obstruction  06/30/2018    Active  Mary Anne Hall

## 2018-06-30 NOTE — BRIEF OPERATIVE NOTE - PROCEDURE
<<-----Click on this checkbox to enter Procedure Sigmoid colostomy  06/30/2018    Active  NALPER  Colonic decompression  06/30/2018    Active  NALPER  Lysis of adhesions  06/30/2018    Active  NALPER  Exploratory laparotomy  06/30/2018    Active  NALPER

## 2018-06-30 NOTE — PROGRESS NOTE ADULT - SUBJECTIVE AND OBJECTIVE BOX
SICU DAILY PROGRESS NOTE    INTERVAL HPI / OVERNIGHT EVENTS:   6/30: Taken to OR diverting loop sigmoidostomy. Deferred resection of strictured colon segment given significant distension. Plan for decompression w/ diverting ostomy now.   6/29: Admitted for GI procedure: unable to advance scope passed 25cm from anal verge, possibly due to diverticulitis causing scarring vs internal herni. Dr Green will take her to OR tonight. LA 4.5 - given 2L IV bolus. repeat LA @3am     MEDICATIONS:  ---NEURO-  ---CV-  ---PULM-  ---GI/FEN-  sodium chloride 0.9%. 1000 milliLiter(s) IV Continuous <Continuous>  ----  ---ID-   cefTRIAXone   IVPB 1 Gram(s) IV Intermittent every 24 hours  metroNIDAZOLE  IVPB 500 milliGRAM(s) IV Intermittent every 8 hours  ---ENDO-  insulin lispro (HumaLOG) corrective regimen sliding scale   SubCutaneous Before meals and at bedtime  ---HEME-  aspirin  chewable 81 milliGRAM(s) Oral daily  heparin  Injectable 5000 Unit(s) SubCutaneous every 8 hours  ---OTHER-    ANTIBIOTICS: NO  PRESSORS: NO  CENTRAL LINE: NO                                              Remove: NO     Indication: Venous access in critically ill patient.  ARTERIAL LINE: YES                                             Remove: NO     Indication: Hemodynamic monitoring in critically ill patient.   URINARY CATHETER: YES                                    Remove: NO     Indication: I/O monitoring in critically ill patient.     VOLUME STATUS:   29 Jun 2018 07:01  -  30 Jun 2018 07:00  --------------------------------------------------------  IN:    IV PiggyBack: 200 mL    sodium chloride 0.9%.: 560 mL  Total IN: 760 mL  OUT:    Indwelling Catheter - Urethral: 400 mL  Total OUT: 400 mL  Total NET: 360 mL    ICU Vital Signs Last 24 Hrs  T(C): 37.1 (30 Jun 2018 06:54), Max: 37.1 (30 Jun 2018 06:54)  T(F): 98.8 (30 Jun 2018 06:54), Max: 98.8 (30 Jun 2018 06:54)  HR: 102 (30 Jun 2018 07:00) (90 - 112)  BP: 155/97 (30 Jun 2018 07:00) (149/93 - 215/118)  BP(mean): 117 (30 Jun 2018 06:00) (83 - 149)  ABP: --  ABP(mean): --  RR: 22 (30 Jun 2018 07:00) (16 - 100)  SpO2: 96% (30 Jun 2018 07:00) (93% - 96%)    CAPILLARY BLOOD GLUCOSE  POCT Blood Glucose.: 119 mg/dL (30 Jun 2018 06:13)    PHYSICAL EXAM:  NEURO: Recently extubated & stuporous while awakening from anesthesia however responsive to noxious stimuli  CV: RRR, S1&S2.   PULM: CTAB, no increased WOB.  ABD: Soft, mildly distended, unable to assess tenderness well due to post-anesthesia state & pain medication however no grimace w/ palpation, no rebound or guarding, LLQ loop sigmoidostomy pink, protuberant, & productive of gas.   EXTR: WWP. No peripheral edema.    LABS:                        14.4   10.8  )-----------( 285      ( 29 Jun 2018 20:42 )             40.6     06-29    138  |  98  |  13  ----------------------------<  218<H>  4.0   |  21<L>  |  0.71    Ca    9.6      29 Jun 2018 20:42    TPro  7.5  /  Alb  4.3  /  TBili  0.9  /  DBili  x   /  AST  70<H>  /  ALT  33  /  AlkPhos  86  06-29    PT/INR - ( 30 Jun 2018 03:30 )   PT: 10.8 sec;   INR: 0.97     PTT - ( 30 Jun 2018 03:30 )  PTT:27.4 sec

## 2018-06-30 NOTE — PROGRESS NOTE ADULT - ASSESSMENT
74 YO F h/o HLD, CAD s/p stent, meningioma s/p resection 8/2017, endometriosis s/p EVERARDO BSO, & h/o colonic volvulus s/p ex lap, colopexy & appendectomy who was recently admitted for descending colitis (concerning for ischemic colitis) responded well to abx and a bowel regimen and discharged earlier today now presents to [facility] ED with worsening constant crampy diffuse abdominal pain and abdominal distention found to have sigmoid stricture with large bowel obstruction.     # Large bowel obstruction  - s/p sigmoidoscopy with inability to pass the scope past 25 cm from the anal verge d/t likely diverticular stricture, severe sigmoid diverticulosis, otherwise normal sigmoid and rectal mucosa  - POD 1 Ex-lap, CONNIE, colonic decompression, loop sigmoidostomy  - NPO  - Pain control  - Monitor stool output  - Further plan per primary team and SICU    # Pancreatic tail lesion  - Recommend non-urgent MRI pancreatic mass protocol    Case d/w Dr. Marshall  GI will sign off at this time, please reconsult if further questions

## 2018-06-30 NOTE — PROGRESS NOTE ADULT - ASSESSMENT
73F w/ CAD and prior colonic volvulus s/p ex lap/colopexy/appendectomy recently admitted with presumed ischemic colitis of the descending colon, readmitted 6/29 w/ LBO d/t sigmoid stricture past which sigmoidoscope could not be passed thus pt was taken to OR 6/30 for diverting loop sigmoidosctomy for decompression.     Neuro: Tylenol/Dilaudid  CV: HD stable, NS@80  Pulm: NC PRN to maintain SpO2 >92%  GI: NPO, IVF, NGT to LIWS  : Clare   ID: None. S/p abx ppx for ischemic colitis--Ceftriaxone/Flagyl (6/29-30)  Endo: ISS  PPx: SCDs, SQH  Lines: PIVs, R radial a-line (6/30--)

## 2018-07-01 LAB
ANION GAP SERPL CALC-SCNC: 10 MMOL/L — SIGNIFICANT CHANGE UP (ref 5–17)
BUN SERPL-MCNC: 13 MG/DL — SIGNIFICANT CHANGE UP (ref 7–23)
CALCIUM SERPL-MCNC: 7.9 MG/DL — LOW (ref 8.4–10.5)
CHLORIDE SERPL-SCNC: 104 MMOL/L — SIGNIFICANT CHANGE UP (ref 96–108)
CO2 SERPL-SCNC: 24 MMOL/L — SIGNIFICANT CHANGE UP (ref 22–31)
CREAT SERPL-MCNC: 0.55 MG/DL — SIGNIFICANT CHANGE UP (ref 0.5–1.3)
GLUCOSE SERPL-MCNC: 118 MG/DL — HIGH (ref 70–99)
HCT VFR BLD CALC: 33.4 % — LOW (ref 34.5–45)
HGB BLD-MCNC: 11.3 G/DL — LOW (ref 11.5–15.5)
MAGNESIUM SERPL-MCNC: 2.1 MG/DL — SIGNIFICANT CHANGE UP (ref 1.6–2.6)
MCHC RBC-ENTMCNC: 30.8 PG — SIGNIFICANT CHANGE UP (ref 27–34)
MCHC RBC-ENTMCNC: 33.8 G/DL — SIGNIFICANT CHANGE UP (ref 32–36)
MCV RBC AUTO: 91 FL — SIGNIFICANT CHANGE UP (ref 80–100)
PHOSPHATE SERPL-MCNC: 2.5 MG/DL — SIGNIFICANT CHANGE UP (ref 2.5–4.5)
PLATELET # BLD AUTO: 215 K/UL — SIGNIFICANT CHANGE UP (ref 150–400)
POTASSIUM SERPL-MCNC: 4.2 MMOL/L — SIGNIFICANT CHANGE UP (ref 3.5–5.3)
POTASSIUM SERPL-SCNC: 4.2 MMOL/L — SIGNIFICANT CHANGE UP (ref 3.5–5.3)
RBC # BLD: 3.67 M/UL — LOW (ref 3.8–5.2)
RBC # FLD: 14.3 % — SIGNIFICANT CHANGE UP (ref 10.3–16.9)
SODIUM SERPL-SCNC: 138 MMOL/L — SIGNIFICANT CHANGE UP (ref 135–145)
WBC # BLD: 6.7 K/UL — SIGNIFICANT CHANGE UP (ref 3.8–10.5)
WBC # FLD AUTO: 6.7 K/UL — SIGNIFICANT CHANGE UP (ref 3.8–10.5)

## 2018-07-01 PROCEDURE — 99232 SBSQ HOSP IP/OBS MODERATE 35: CPT | Mod: GC

## 2018-07-01 PROCEDURE — 71045 X-RAY EXAM CHEST 1 VIEW: CPT | Mod: 26

## 2018-07-01 RX ORDER — ACETAMINOPHEN 500 MG
1000 TABLET ORAL ONCE
Qty: 0 | Refills: 0 | Status: COMPLETED | OUTPATIENT
Start: 2018-07-01 | End: 2018-07-01

## 2018-07-01 RX ORDER — LIDOCAINE 4 G/100G
10 CREAM TOPICAL ONCE
Qty: 0 | Refills: 0 | Status: COMPLETED | OUTPATIENT
Start: 2018-07-01 | End: 2018-07-01

## 2018-07-01 RX ORDER — ONDANSETRON 8 MG/1
4 TABLET, FILM COATED ORAL EVERY 6 HOURS
Qty: 0 | Refills: 0 | Status: DISCONTINUED | OUTPATIENT
Start: 2018-07-01 | End: 2018-07-09

## 2018-07-01 RX ORDER — SODIUM CHLORIDE 9 MG/ML
1000 INJECTION, SOLUTION INTRAVENOUS
Qty: 0 | Refills: 0 | Status: DISCONTINUED | OUTPATIENT
Start: 2018-07-01 | End: 2018-07-04

## 2018-07-01 RX ORDER — HYDRALAZINE HCL 50 MG
5 TABLET ORAL ONCE
Qty: 0 | Refills: 0 | Status: COMPLETED | OUTPATIENT
Start: 2018-07-01 | End: 2018-07-01

## 2018-07-01 RX ADMIN — Medication 1000 MILLIGRAM(S): at 23:14

## 2018-07-01 RX ADMIN — HEPARIN SODIUM 5000 UNIT(S): 5000 INJECTION INTRAVENOUS; SUBCUTANEOUS at 17:55

## 2018-07-01 RX ADMIN — SODIUM CHLORIDE 70 MILLILITER(S): 9 INJECTION, SOLUTION INTRAVENOUS at 11:59

## 2018-07-01 RX ADMIN — Medication 400 MILLIGRAM(S): at 22:54

## 2018-07-01 RX ADMIN — LEVETIRACETAM 400 MILLIGRAM(S): 250 TABLET, FILM COATED ORAL at 07:39

## 2018-07-01 RX ADMIN — HYDROMORPHONE HYDROCHLORIDE 0.5 MILLIGRAM(S): 2 INJECTION INTRAMUSCULAR; INTRAVENOUS; SUBCUTANEOUS at 10:29

## 2018-07-01 RX ADMIN — HEPARIN SODIUM 5000 UNIT(S): 5000 INJECTION INTRAVENOUS; SUBCUTANEOUS at 22:23

## 2018-07-01 RX ADMIN — HYDROMORPHONE HYDROCHLORIDE 1 MILLIGRAM(S): 2 INJECTION INTRAMUSCULAR; INTRAVENOUS; SUBCUTANEOUS at 03:30

## 2018-07-01 RX ADMIN — Medication 62.5 MILLIMOLE(S): at 08:52

## 2018-07-01 RX ADMIN — HEPARIN SODIUM 5000 UNIT(S): 5000 INJECTION INTRAVENOUS; SUBCUTANEOUS at 07:39

## 2018-07-01 RX ADMIN — HYDROMORPHONE HYDROCHLORIDE 0.5 MILLIGRAM(S): 2 INJECTION INTRAMUSCULAR; INTRAVENOUS; SUBCUTANEOUS at 02:13

## 2018-07-01 RX ADMIN — LEVETIRACETAM 400 MILLIGRAM(S): 250 TABLET, FILM COATED ORAL at 18:25

## 2018-07-01 RX ADMIN — Medication 400 MILLIGRAM(S): at 12:14

## 2018-07-01 RX ADMIN — HYDROMORPHONE HYDROCHLORIDE 1 MILLIGRAM(S): 2 INJECTION INTRAMUSCULAR; INTRAVENOUS; SUBCUTANEOUS at 03:45

## 2018-07-01 RX ADMIN — HYDROMORPHONE HYDROCHLORIDE 0.5 MILLIGRAM(S): 2 INJECTION INTRAMUSCULAR; INTRAVENOUS; SUBCUTANEOUS at 02:30

## 2018-07-01 RX ADMIN — HYDROMORPHONE HYDROCHLORIDE 0.5 MILLIGRAM(S): 2 INJECTION INTRAMUSCULAR; INTRAVENOUS; SUBCUTANEOUS at 11:04

## 2018-07-01 RX ADMIN — LIDOCAINE 10 MILLILITER(S): 4 CREAM TOPICAL at 12:59

## 2018-07-01 RX ADMIN — Medication 5 MILLIGRAM(S): at 18:59

## 2018-07-01 NOTE — PROGRESS NOTE ADULT - SUBJECTIVE AND OBJECTIVE BOX
INTERVAL HPI/OVERNIGHT EVENTS: O/N: Started Keppra 500 IV BID for seizure ppx; will confirm home dose (pt cannot recall).   6/30: Taken to OR diverting loop sigmoidostomy. Deferred resection of strictured colon segment given significant distension. Plan for decompression w/ diverting ostomy now.     Pt seen and examined. c/o incisional pain. Denies CP/SOB/N/V    MEDICATIONS  (STANDING):  dextrose 5%. 1000 milliLiter(s) (50 mL/Hr) IV Continuous <Continuous>  dextrose 50% Injectable 12.5 Gram(s) IV Push once  dextrose 50% Injectable 25 Gram(s) IV Push once  dextrose 50% Injectable 25 Gram(s) IV Push once  heparin  Injectable 5000 Unit(s) SubCutaneous every 8 hours  insulin lispro (HumaLOG) corrective regimen sliding scale   SubCutaneous Before meals and at bedtime  levETIRAcetam  IVPB 500 milliGRAM(s) IV Intermittent every 12 hours  sodium chloride 0.9%. 1000 milliLiter(s) (100 mL/Hr) IV Continuous <Continuous>  sodium phosphate IVPB 15 milliMole(s) IV Intermittent once    MEDICATIONS  (PRN):  dextrose 40% Gel 15 Gram(s) Oral once PRN Blood Glucose LESS THAN 70 milliGRAM(s)/deciliter  glucagon  Injectable 1 milliGRAM(s) IntraMuscular once PRN Glucose LESS THAN 70 milligrams/deciliter  HYDROmorphone  Injectable 0.5 milliGRAM(s) IV Push every 4 hours PRN Moderate Pain (4 - 6)  HYDROmorphone  Injectable 1 milliGRAM(s) IV Push every 4 hours PRN Severe Pain (7 - 10)      Drug Dosing Weight  Height (cm): 167.64 (30 Jun 2018 06:54)  Weight (kg): 71 (30 Jun 2018 06:54)  BMI (kg/m2): 25.3 (30 Jun 2018 06:54)  BSA (m2): 1.8 (30 Jun 2018 06:54)      PAST MEDICAL & SURGICAL HISTORY:  Lyme disease  CAD (coronary artery disease)  Meningioma  High cholesterol  H/O meningioma of the brain  S/P EVERARDO (total abdominal hysterectomy)      ICU Vital Signs Last 24 Hrs  T(C): 37.4 (01 Jul 2018 05:54), Max: 37.9 (01 Jul 2018 02:15)  T(F): 99.3 (01 Jul 2018 05:54), Max: 100.2 (01 Jul 2018 02:15)  HR: 86 (01 Jul 2018 05:00) (60 - 100)  BP: 133/76 (01 Jul 2018 05:00) (119/67 - 146/76)  BP(mean): 95 (01 Jul 2018 05:00) (84 - 108)  ABP: 157/66 (01 Jul 2018 05:00) (126/98 - 175/77)  ABP(mean): 88 (01 Jul 2018 05:00) (81 - 114)  RR: 10 (01 Jul 2018 05:00) (10 - 25)  SpO2: 94% (01 Jul 2018 05:00) (92% - 97%)            30 Jun 2018 07:01  -  01 Jul 2018 07:00  --------------------------------------------------------  IN:    sodium chloride 0.9%: 80 mL    sodium chloride 0.9%.: 1600 mL  Total IN: 1680 mL    OUT:    Indwelling Catheter - Urethral: 695 mL    Nasoenteral Tube: 100 mL  Total OUT: 795 mL    Total NET: 885 mL      PHYSICAL EXAM:  GEN: NAD, resting comfortably in bed  NEURO: A&Ox3, no focal deficits  CV: RRR, S1&S2   PULM: CTAx2, no respiratory distress  ABD: Softly distended, appropriately TTP at incision site. no rebound, no guarding. LLQ loop sigmoidostomy pink, protuberant, with gas and sweat.  : jimenez draining clear urine  EXTR: WWP. No peripheral edema.  PSYC: appropriate        LABS:  CBC Full  -  ( 01 Jul 2018 05:44 )  WBC Count : 6.7 K/uL  Hemoglobin : 11.3 g/dL  Hematocrit : 33.4 %  Platelet Count - Automated : 215 K/uL  Mean Cell Volume : 91.0 fL  Mean Cell Hemoglobin : 30.8 pg  Mean Cell Hemoglobin Concentration : 33.8 g/dL  Auto Neutrophil # : x  Auto Lymphocyte # : x  Auto Monocyte # : x  Auto Eosinophil # : x  Auto Basophil # : x  Auto Neutrophil % : x  Auto Lymphocyte % : x  Auto Monocyte % : x  Auto Eosinophil % : x  Auto Basophil % : x    07-01    138  |  104  |  13  ----------------------------<  118<H>  4.2   |  24  |  0.55    Ca    7.9<L>      01 Jul 2018 05:44  Phos  2.5     07-01  Mg     2.1     07-01    TPro  4.9<L>  /  Alb  2.9<L>  /  TBili  0.6  /  DBili  x   /  AST  73<H>  /  ALT  36  /  AlkPhos  55  06-30    PT/INR - ( 30 Jun 2018 03:30 )   PT: 10.8 sec;   INR: 0.97          PTT - ( 30 Jun 2018 03:30 )  PTT:27.4 sec

## 2018-07-01 NOTE — PROGRESS NOTE ADULT - SUBJECTIVE AND OBJECTIVE BOX
GENERAL SURGERY DAILY PROGRESS NOTE:    INTERVAL HPI / OVERNIGHT EVENTS:    STATUS POST:  diverting loop sigmoidostomy    POST OPERATIVE DAY #: 1    SUBJECTIVE:  Pt seen & examined at bedside. Complaining of abdominal pain, controlled by medication.    MEDICATIONS  (STANDING):  heparin  Injectable 5000 Unit(s) SubCutaneous every 8 hours  levETIRAcetam  IVPB 500 milliGRAM(s) IV Intermittent every 12 hours  sodium chloride 0.9%. 1000 milliLiter(s) (100 mL/Hr) IV Continuous <Continuous>    MEDICATIONS  (PRN):  HYDROmorphone  Injectable 0.5 milliGRAM(s) IV Push every 4 hours PRN Moderate Pain (4 - 6)  HYDROmorphone  Injectable 1 milliGRAM(s) IV Push every 4 hours PRN Severe Pain (7 - 10)      Vital Signs Last 24 Hrs  T(C): 37.8 (01 Jul 2018 09:05), Max: 37.9 (01 Jul 2018 02:15)  T(F): 100 (01 Jul 2018 09:05), Max: 100.2 (01 Jul 2018 02:15)  HR: 98 (01 Jul 2018 09:00) (60 - 101)  BP: 149/79 (01 Jul 2018 09:00) (119/67 - 170/72)  BP(mean): 101 (01 Jul 2018 09:00) (84 - 108)  RR: 24 (01 Jul 2018 09:00) (11 - 25)  SpO2: 93% (01 Jul 2018 09:00) (91% - 97%)    PHYSICAL EXAM:  Neuro: NAD, A&Ox3  Resp: No incr WOB  CV: NSR, RRR   Abd: NGT in place, Softly distended tender globally, ostomy pink and protruding with gas to bag. small s/s staining to midline dressing.   : yellow urine to jimenez                    I&O's Detail    30 Jun 2018 07:01  -  01 Jul 2018 07:00  --------------------------------------------------------  IN:    IV PiggyBack: 100 mL    sodium chloride 0.9%: 80 mL    sodium chloride 0.9%.: 1700 mL  Total IN: 1880 mL    OUT:    Indwelling Catheter - Urethral: 825 mL    Nasoenteral Tube: 100 mL  Total OUT: 925 mL    Total NET: 955 mL      01 Jul 2018 07:01  -  01 Jul 2018 10:09  --------------------------------------------------------  IN:    sodium chloride 0.9%.: 200 mL  Total IN: 200 mL    OUT:    Indwelling Catheter - Urethral: 210 mL  Total OUT: 210 mL    Total NET: -10 mL          LABS:                        11.3   6.7   )-----------( 215      ( 01 Jul 2018 05:44 )             33.4     07-01    138  |  104  |  13  ----------------------------<  118<H>  4.2   |  24  |  0.55    Ca    7.9<L>      01 Jul 2018 05:44  Phos  2.5     07-01  Mg     2.1     07-01    TPro  4.9<L>  /  Alb  2.9<L>  /  TBili  0.6  /  DBili  x   /  AST  73<H>  /  ALT  36  /  AlkPhos  55  06-30    PT/INR - ( 30 Jun 2018 03:30 )   PT: 10.8 sec;   INR: 0.97          PTT - ( 30 Jun 2018 03:30 )  PTT:27.4 sec

## 2018-07-01 NOTE — DIETITIAN INITIAL EVALUATION ADULT. - ENERGY NEEDS
IBW: 59kg  %IBW: 120%  BMI: 25.3kg/m2--indicates borderline overweight  EER based on actual body weight 2/2 within % of IBW

## 2018-07-01 NOTE — PROGRESS NOTE ADULT - ASSESSMENT
73F w/ CAD and prior colonic volvulus s/p ex lap/colopexy/appendectomy recently admitted with presumed ischemic colitis of the descending colon, readmitted 6/29 w/ LBO d/t sigmoid stricture past which sigmoidoscope could not be passed thus pt was taken to OR 6/30 for diverting loop sigmoidostomy for decompression.     -NGT to stay  -continue to monitor ostomy output  -remove jimenez   -step down  -rest of care per sicu team

## 2018-07-01 NOTE — PROGRESS NOTE ADULT - ASSESSMENT
73F w/ CAD and prior colonic volvulus s/p ex lap/colopexy/appendectomy recently admitted with presumed ischemic colitis of the descending colon, readmitted 6/29 w/ LBO d/t sigmoid stricture past which sigmoidoscope could not be passed thus pt was taken to OR 6/30 for diverting loop sigmoidostomy for decompression.     Neuro: Tylenol/Dilaudid, Keppra 500 BID  CV: HD stable, LR@100  Pulm: NC PRN to maintain SpO2 >92%  GI: NPO, IVF, NGT to LIWS  : Clare   ID: None. S/p abx ppx for ischemic colitis--Ceftriaxone/Flagyl (6/29-30)  Endo: ISS  PPx: SCDs, SQH  Lines: PIVs, R radial a-line (6/30--) 73F w/ CAD and prior colonic volvulus s/p ex lap/colopexy/appendectomy recently admitted with presumed ischemic colitis of the descending colon, readmitted 6/29 w/ LBO d/t sigmoid stricture past which sigmoidoscope could not be passed thus pt was taken to OR 6/30 for diverting loop sigmoidostomy for decompression.     Neuro: Tylenol/Dilaudid, Keppra 500 BID  CV: HD stable, LR@100  Pulm: NC PRN to maintain SpO2 >92%  GI: NPO, IVF, NGT to LIWS  : Clare   ID: None. S/p abx ppx for ischemic colitis--Ceftriaxone/Flagyl (6/29-30)  Endo: ISS  PPx: SCDs, SQH  Lines: PIVs, Will remove R radial a-line (6/30--)

## 2018-07-01 NOTE — DIETITIAN INITIAL EVALUATION ADULT. - OTHER INFO
Patient seen per length of stay protocol. Remains NPO with NGT to suction. Reports consuming normal PO diet with good appetite (gets most food from local deli, sandwiches, tuna, soups) until onset of symptoms. Patient currently denies N/V/D/C at this time, reports gas pain. RN aware. Skin integrity: colostomy, midline abdomen. NKFA.

## 2018-07-02 DIAGNOSIS — K56.609 UNSPECIFIED INTESTINAL OBSTRUCTION, UNSPECIFIED AS TO PARTIAL VERSUS COMPLETE OBSTRUCTION: ICD-10-CM

## 2018-07-02 DIAGNOSIS — I25.10 ATHEROSCLEROTIC HEART DISEASE OF NATIVE CORONARY ARTERY WITHOUT ANGINA PECTORIS: ICD-10-CM

## 2018-07-02 DIAGNOSIS — D62 ACUTE POSTHEMORRHAGIC ANEMIA: ICD-10-CM

## 2018-07-02 DIAGNOSIS — E78.5 HYPERLIPIDEMIA, UNSPECIFIED: ICD-10-CM

## 2018-07-02 LAB
ANION GAP SERPL CALC-SCNC: 11 MMOL/L — SIGNIFICANT CHANGE UP (ref 5–17)
APPEARANCE UR: CLEAR — SIGNIFICANT CHANGE UP
BILIRUB UR-MCNC: ABNORMAL
BUN SERPL-MCNC: 8 MG/DL — SIGNIFICANT CHANGE UP (ref 7–23)
CALCIUM SERPL-MCNC: 8.2 MG/DL — LOW (ref 8.4–10.5)
CHLORIDE SERPL-SCNC: 103 MMOL/L — SIGNIFICANT CHANGE UP (ref 96–108)
CO2 SERPL-SCNC: 26 MMOL/L — SIGNIFICANT CHANGE UP (ref 22–31)
COLOR SPEC: YELLOW — SIGNIFICANT CHANGE UP
CREAT SERPL-MCNC: 0.53 MG/DL — SIGNIFICANT CHANGE UP (ref 0.5–1.3)
DIFF PNL FLD: ABNORMAL
GLUCOSE BLDC GLUCOMTR-MCNC: 106 MG/DL — HIGH (ref 70–99)
GLUCOSE BLDC GLUCOMTR-MCNC: 124 MG/DL — HIGH (ref 70–99)
GLUCOSE BLDC GLUCOMTR-MCNC: 128 MG/DL — HIGH (ref 70–99)
GLUCOSE SERPL-MCNC: 140 MG/DL — HIGH (ref 70–99)
GLUCOSE UR QL: NEGATIVE — SIGNIFICANT CHANGE UP
HCT VFR BLD CALC: 32.1 % — LOW (ref 34.5–45)
HGB BLD-MCNC: 11.3 G/DL — LOW (ref 11.5–15.5)
KETONES UR-MCNC: 15 MG/DL
LEUKOCYTE ESTERASE UR-ACNC: NEGATIVE — SIGNIFICANT CHANGE UP
MAGNESIUM SERPL-MCNC: 2.2 MG/DL — SIGNIFICANT CHANGE UP (ref 1.6–2.6)
MCHC RBC-ENTMCNC: 31.7 PG — SIGNIFICANT CHANGE UP (ref 27–34)
MCHC RBC-ENTMCNC: 35.2 G/DL — SIGNIFICANT CHANGE UP (ref 32–36)
MCV RBC AUTO: 89.9 FL — SIGNIFICANT CHANGE UP (ref 80–100)
NITRITE UR-MCNC: NEGATIVE — SIGNIFICANT CHANGE UP
PH UR: 6 — SIGNIFICANT CHANGE UP (ref 5–8)
PHOSPHATE SERPL-MCNC: 2.1 MG/DL — LOW (ref 2.5–4.5)
PLATELET # BLD AUTO: 210 K/UL — SIGNIFICANT CHANGE UP (ref 150–400)
POTASSIUM SERPL-MCNC: 3.9 MMOL/L — SIGNIFICANT CHANGE UP (ref 3.5–5.3)
POTASSIUM SERPL-SCNC: 3.9 MMOL/L — SIGNIFICANT CHANGE UP (ref 3.5–5.3)
PROT UR-MCNC: ABNORMAL MG/DL
RBC # BLD: 3.57 M/UL — LOW (ref 3.8–5.2)
RBC # FLD: 14 % — SIGNIFICANT CHANGE UP (ref 10.3–16.9)
SODIUM SERPL-SCNC: 140 MMOL/L — SIGNIFICANT CHANGE UP (ref 135–145)
SP GR SPEC: 1.02 — SIGNIFICANT CHANGE UP (ref 1–1.03)
UROBILINOGEN FLD QL: 0.2 E.U./DL — SIGNIFICANT CHANGE UP
WBC # BLD: 6.4 K/UL — SIGNIFICANT CHANGE UP (ref 3.8–10.5)
WBC # FLD AUTO: 6.4 K/UL — SIGNIFICANT CHANGE UP (ref 3.8–10.5)

## 2018-07-02 PROCEDURE — 71045 X-RAY EXAM CHEST 1 VIEW: CPT | Mod: 26

## 2018-07-02 PROCEDURE — 99232 SBSQ HOSP IP/OBS MODERATE 35: CPT | Mod: GC

## 2018-07-02 RX ORDER — ASPIRIN/CALCIUM CARB/MAGNESIUM 324 MG
81 TABLET ORAL DAILY
Qty: 0 | Refills: 0 | Status: DISCONTINUED | OUTPATIENT
Start: 2018-07-02 | End: 2018-07-09

## 2018-07-02 RX ORDER — POTASSIUM PHOSPHATE, MONOBASIC POTASSIUM PHOSPHATE, DIBASIC 236; 224 MG/ML; MG/ML
21 INJECTION, SOLUTION INTRAVENOUS ONCE
Qty: 0 | Refills: 0 | Status: COMPLETED | OUTPATIENT
Start: 2018-07-02 | End: 2018-07-02

## 2018-07-02 RX ORDER — KETOROLAC TROMETHAMINE 30 MG/ML
15 SYRINGE (ML) INJECTION EVERY 6 HOURS
Qty: 0 | Refills: 0 | Status: DISCONTINUED | OUTPATIENT
Start: 2018-07-02 | End: 2018-07-05

## 2018-07-02 RX ORDER — ATORVASTATIN CALCIUM 80 MG/1
40 TABLET, FILM COATED ORAL AT BEDTIME
Qty: 0 | Refills: 0 | Status: DISCONTINUED | OUTPATIENT
Start: 2018-07-02 | End: 2018-07-09

## 2018-07-02 RX ORDER — KETOROLAC TROMETHAMINE 30 MG/ML
15 SYRINGE (ML) INJECTION ONCE
Qty: 0 | Refills: 0 | Status: DISCONTINUED | OUTPATIENT
Start: 2018-07-02 | End: 2018-07-02

## 2018-07-02 RX ADMIN — HEPARIN SODIUM 5000 UNIT(S): 5000 INJECTION INTRAVENOUS; SUBCUTANEOUS at 13:51

## 2018-07-02 RX ADMIN — LEVETIRACETAM 400 MILLIGRAM(S): 250 TABLET, FILM COATED ORAL at 06:31

## 2018-07-02 RX ADMIN — Medication 15 MILLIGRAM(S): at 17:46

## 2018-07-02 RX ADMIN — Medication 15 MILLIGRAM(S): at 11:40

## 2018-07-02 RX ADMIN — Medication 81 MILLIGRAM(S): at 17:46

## 2018-07-02 RX ADMIN — LEVETIRACETAM 400 MILLIGRAM(S): 250 TABLET, FILM COATED ORAL at 17:46

## 2018-07-02 RX ADMIN — SODIUM CHLORIDE 70 MILLILITER(S): 9 INJECTION, SOLUTION INTRAVENOUS at 17:45

## 2018-07-02 RX ADMIN — Medication 15 MILLIGRAM(S): at 11:18

## 2018-07-02 RX ADMIN — HEPARIN SODIUM 5000 UNIT(S): 5000 INJECTION INTRAVENOUS; SUBCUTANEOUS at 06:31

## 2018-07-02 RX ADMIN — Medication 15 MILLIGRAM(S): at 18:00

## 2018-07-02 RX ADMIN — HEPARIN SODIUM 5000 UNIT(S): 5000 INJECTION INTRAVENOUS; SUBCUTANEOUS at 21:25

## 2018-07-02 RX ADMIN — POTASSIUM PHOSPHATE, MONOBASIC POTASSIUM PHOSPHATE, DIBASIC 64.25 MILLIMOLE(S): 236; 224 INJECTION, SOLUTION INTRAVENOUS at 11:18

## 2018-07-02 RX ADMIN — ATORVASTATIN CALCIUM 40 MILLIGRAM(S): 80 TABLET, FILM COATED ORAL at 21:26

## 2018-07-02 NOTE — PROGRESS NOTE ADULT - ASSESSMENT
73F w/ CAD and prior colonic volvulus s/p ex lap/colopexy/appendectomy recently admitted with presumed ischemic colitis of the descending colon, readmitted 6/29 w/ LBO d/t sigmoid stricture past which sigmoidoscope could not be passed thus pt was taken to OR 6/30 for diverting loop sigmoidostomy for decompression.     Neuro: Tylenol/Dilaudid, Keppra 500 BID  CV: HD stable, D5 1/2 @ 70  Pulm: NC PRN to maintain SpO2 >92%  GI: NPO, IVF, NGT to LIWS  : Sparks removed 7/2    ID: None. S/p abx ppx for ischemic colitis--Ceftriaxone/Flagyl (6/29-30)  Endo: ISS  PPx: SCDs, SQH  Lines: PIVs, R radial a-line (6/30--)    Ostomy: continue to watch for output  Consider removal of NG tube in AM

## 2018-07-02 NOTE — PROGRESS NOTE ADULT - SUBJECTIVE AND OBJECTIVE BOX
O/N: Still no NGT output, fever workup for 101.3, blood cultures, cxr, ua, pt has been confused and called the , dilaudid dc'xavier  : NGT flushed,      SUBJECTIVE: Patient seen and examined bedside by chief resident.   Pt admits to abdominal pain. Fever overnight has resolved.   Denies current fever, chills, nausea, emesis, chest pain, dyspnea.     heparin  Injectable 5000 Unit(s) SubCutaneous every 8 hours      Vital Signs Last 24 Hrs  T(C): 37.2 (2018 05:21), Max: 38.5 (2018 22:20)  T(F): 98.9 (2018 05:21), Max: 101.3 (2018 22:20)  HR: 84 (2018 04:46) (84 - 110)  BP: 161/80 (2018 04:46) (137/89 - 174/84)  BP(mean): 113 (2018 04:46) (96 - 121)  RR: 18 (2018 04:46) (16 - 36)  SpO2: 95% (2018 04:46) (89% - 97%)  I&O's Detail    2018 07:01  -  2018 07:00  --------------------------------------------------------  IN:    dextrose 5% + sodium chloride 0.45%: 1365 mL    IV PiggyBack: 350 mL    sodium chloride 0.9%: 300 mL    Solution: 200 mL  Total IN: 2215 mL    OUT:    Colostomy: 25 mL    Indwelling Catheter - Urethral: 3155 mL    Nasoenteral Tube: 100 mL  Total OUT: 3280 mL    Total NET: -1065 mL          General: NAD, resting comfortably in bed  C/V: NSR  Pulm: Nonlabored breathing, no respiratory distress  Abd: soft, nondistended, mild tenderness to palpation, incision c/d/i, ostomy in place and functioning   Extrem: WWP, no edema, SCDs in place        LABS:                        11.3   6.4   )-----------( 210      ( 2018 05:39 )             32.1     07-02    140  |  103  |  8   ----------------------------<  140<H>  3.9   |  26  |  0.53    Ca    8.2<L>      2018 05:39  Phos  2.1     07-  Mg     2.2     07-02    TPro  4.9<L>  /  Alb  2.9<L>  /  TBili  0.6  /  DBili  x   /  AST  73<H>  /  ALT  36  /  AlkPhos  55  06-30      Urinalysis Basic - ( 2018 23:32 )    Color: Yellow / Appearance: Clear / S.025 / pH: x  Gluc: x / Ketone: 15 mg/dL  / Bili: Small / Urobili: 0.2 E.U./dL   Blood: x / Protein: Trace mg/dL / Nitrite: NEGATIVE   Leuk Esterase: NEGATIVE / RBC: > 10 /HPF / WBC < 5 /HPF   Sq Epi: x / Non Sq Epi: 0-5 /HPF / Bacteria: Present /HPF

## 2018-07-02 NOTE — ADVANCED PRACTICE NURSE CONSULT - REASON FOR CONSULT
[facility] nurse consult to instruct patient on ostomy care. Patient is a 73F w/ h/o HLD, CAD s/p stent, meningioma s/p resection 8/2017, endometriosis s/p EVERARDO BSO, & h/o colonic volvulus s/p ex lap, colopexy & appendectomy who was recently admitted for descending colitis (concerning for ischemic colitis) responded well to abx and a bowel regimen, then presented to [facility] ED with worsening constant crampy diffuse abdominal pain and abdominal distention. Patient is s/p exploratory laparotomy revealed severely dilated colon from the cecum to the sigmoid colon where the area of suspected diverticular stricture is located. Lysis of adhesions performed. Small bowel serosal tear repaired.  Loop sigmoid colostomy created 6/30.

## 2018-07-02 NOTE — PROGRESS NOTE ADULT - SUBJECTIVE AND OBJECTIVE BOX
Interval Events: Reviewed  Patient seen and examined at bedside.    Patient is a 73y old  Female who presents with a chief complaint of Colonic obstruction (2018 23:06)    She is feeling better today she was out of bed. Not passing gas  PAST MEDICAL & SURGICAL HISTORY:  Lyme disease  CAD (coronary artery disease)  Meningioma  High cholesterol  H/O meningioma of the brain  S/P EVERARDO (total abdominal hysterectomy)      MEDICATIONS:  Pulmonary:    Antimicrobials:    Anticoagulants:  aspirin  chewable 81 milliGRAM(s) Oral daily  heparin  Injectable 5000 Unit(s) SubCutaneous every 8 hours    Cardiac:      Allergies    No Known Allergies    Intolerances        Vital Signs Last 24 Hrs  T(C): 36.6 (2018 21:58), Max: 37.2 (2018 05:21)  T(F): 97.8 (2018 21:58), Max: 98.9 (2018 05:21)  HR: 62 (2018 20:48) (62 - 100)  BP: 167/76 (2018 20:48) (130/77 - 167/76)  BP(mean): 109 (2018 20:48) (98 - 115)  RR: 15 (2018 20:48) (15 - 30)  SpO2: 97% (2018 20:48) (94% - 97%)     @ 07: @ 07:00  --------------------------------------------------------  IN: 2215 mL / OUT: 3280 mL / NET: -1065 mL     @ 07: @ 22:57  --------------------------------------------------------  IN: 840 mL / OUT: 900 mL / NET: -60 mL          LABS:      CBC Full  -  ( 2018 05:39 )  WBC Count : 6.4 K/uL  Hemoglobin : 11.3 g/dL  Hematocrit : 32.1 %  Platelet Count - Automated : 210 K/uL  Mean Cell Volume : 89.9 fL  Mean Cell Hemoglobin : 31.7 pg  Mean Cell Hemoglobin Concentration : 35.2 g/dL  Auto Neutrophil # : x  Auto Lymphocyte # : x  Auto Monocyte # : x  Auto Eosinophil # : x  Auto Basophil # : x  Auto Neutrophil % : x  Auto Lymphocyte % : x  Auto Monocyte % : x  Auto Eosinophil % : x  Auto Basophil % : x        140  |  103  |  8   ----------------------------<  140<H>  3.9   |  26  |  0.53    Ca    8.2<L>      2018 05:39  Phos  2.1       Mg     2.2                 Urinalysis Basic - ( 2018 23:32 )    Color: Yellow / Appearance: Clear / S.025 / pH: x  Gluc: x / Ketone: 15 mg/dL  / Bili: Small / Urobili: 0.2 E.U./dL   Blood: x / Protein: Trace mg/dL / Nitrite: NEGATIVE   Leuk Esterase: NEGATIVE / RBC: > 10 /HPF / WBC < 5 /HPF   Sq Epi: x / Non Sq Epi: 0-5 /HPF / Bacteria: Present /HPF              Culture Results:   No growth at 12 hours ( @ 23:49)  Culture Results:   No growth at 12 hours ( @ 23:49)      RADIOLOGY & ADDITIONAL STUDIES (The following images were personally reviewed):  Sparks:                                     No  Urine output:                       adequate  DVT prophylaxis:                 Yes  Flattus:                                  Yes  Bowel movement:              No

## 2018-07-02 NOTE — ADVANCED PRACTICE NURSE CONSULT - ASSESSMENT
Patient alert and receptive to teaching. Pouching system intact with small amount of serosanguinous exudate in pouch. Instructed patient on the creation of a colostomy, introduced patient to pouching system and provided with Colostomy Teaching guide.

## 2018-07-02 NOTE — PROVIDER CONTACT NOTE (OTHER) - SITUATION
Patient states having a slight panic attack and not being aware of where she was. Repeated request for Dr. Murphy.

## 2018-07-03 LAB
ANION GAP SERPL CALC-SCNC: 10 MMOL/L — SIGNIFICANT CHANGE UP (ref 5–17)
BUN SERPL-MCNC: 8 MG/DL — SIGNIFICANT CHANGE UP (ref 7–23)
CALCIUM SERPL-MCNC: 8.5 MG/DL — SIGNIFICANT CHANGE UP (ref 8.4–10.5)
CHLORIDE SERPL-SCNC: 106 MMOL/L — SIGNIFICANT CHANGE UP (ref 96–108)
CO2 SERPL-SCNC: 26 MMOL/L — SIGNIFICANT CHANGE UP (ref 22–31)
CREAT SERPL-MCNC: 0.5 MG/DL — SIGNIFICANT CHANGE UP (ref 0.5–1.3)
GLUCOSE SERPL-MCNC: 125 MG/DL — HIGH (ref 70–99)
HCT VFR BLD CALC: 32.3 % — LOW (ref 34.5–45)
HGB BLD-MCNC: 10.8 G/DL — LOW (ref 11.5–15.5)
MAGNESIUM SERPL-MCNC: 2.2 MG/DL — SIGNIFICANT CHANGE UP (ref 1.6–2.6)
MCHC RBC-ENTMCNC: 30.9 PG — SIGNIFICANT CHANGE UP (ref 27–34)
MCHC RBC-ENTMCNC: 33.4 G/DL — SIGNIFICANT CHANGE UP (ref 32–36)
MCV RBC AUTO: 92.6 FL — SIGNIFICANT CHANGE UP (ref 80–100)
PHOSPHATE SERPL-MCNC: 2.4 MG/DL — LOW (ref 2.5–4.5)
PLATELET # BLD AUTO: 249 K/UL — SIGNIFICANT CHANGE UP (ref 150–400)
POTASSIUM SERPL-MCNC: 3.8 MMOL/L — SIGNIFICANT CHANGE UP (ref 3.5–5.3)
POTASSIUM SERPL-SCNC: 3.8 MMOL/L — SIGNIFICANT CHANGE UP (ref 3.5–5.3)
RBC # BLD: 3.49 M/UL — LOW (ref 3.8–5.2)
RBC # FLD: 14.5 % — SIGNIFICANT CHANGE UP (ref 10.3–16.9)
SODIUM SERPL-SCNC: 142 MMOL/L — SIGNIFICANT CHANGE UP (ref 135–145)
WBC # BLD: 6.1 K/UL — SIGNIFICANT CHANGE UP (ref 3.8–10.5)
WBC # FLD AUTO: 6.1 K/UL — SIGNIFICANT CHANGE UP (ref 3.8–10.5)

## 2018-07-03 PROCEDURE — 99232 SBSQ HOSP IP/OBS MODERATE 35: CPT | Mod: GC

## 2018-07-03 RX ORDER — POTASSIUM PHOSPHATE, MONOBASIC POTASSIUM PHOSPHATE, DIBASIC 236; 224 MG/ML; MG/ML
21 INJECTION, SOLUTION INTRAVENOUS ONCE
Qty: 0 | Refills: 0 | Status: COMPLETED | OUTPATIENT
Start: 2018-07-03 | End: 2018-07-03

## 2018-07-03 RX ADMIN — Medication 15 MILLIGRAM(S): at 17:48

## 2018-07-03 RX ADMIN — Medication 15 MILLIGRAM(S): at 05:58

## 2018-07-03 RX ADMIN — SODIUM CHLORIDE 70 MILLILITER(S): 9 INJECTION, SOLUTION INTRAVENOUS at 21:58

## 2018-07-03 RX ADMIN — HEPARIN SODIUM 5000 UNIT(S): 5000 INJECTION INTRAVENOUS; SUBCUTANEOUS at 14:58

## 2018-07-03 RX ADMIN — ONDANSETRON 4 MILLIGRAM(S): 8 TABLET, FILM COATED ORAL at 12:04

## 2018-07-03 RX ADMIN — ATORVASTATIN CALCIUM 40 MILLIGRAM(S): 80 TABLET, FILM COATED ORAL at 21:57

## 2018-07-03 RX ADMIN — SODIUM CHLORIDE 70 MILLILITER(S): 9 INJECTION, SOLUTION INTRAVENOUS at 12:04

## 2018-07-03 RX ADMIN — Medication 15 MILLIGRAM(S): at 01:04

## 2018-07-03 RX ADMIN — LEVETIRACETAM 400 MILLIGRAM(S): 250 TABLET, FILM COATED ORAL at 05:58

## 2018-07-03 RX ADMIN — Medication 15 MILLIGRAM(S): at 17:18

## 2018-07-03 RX ADMIN — HEPARIN SODIUM 5000 UNIT(S): 5000 INJECTION INTRAVENOUS; SUBCUTANEOUS at 05:58

## 2018-07-03 RX ADMIN — Medication 81 MILLIGRAM(S): at 12:04

## 2018-07-03 RX ADMIN — Medication 15 MILLIGRAM(S): at 12:30

## 2018-07-03 RX ADMIN — Medication 15 MILLIGRAM(S): at 23:45

## 2018-07-03 RX ADMIN — Medication 15 MILLIGRAM(S): at 01:34

## 2018-07-03 RX ADMIN — HEPARIN SODIUM 5000 UNIT(S): 5000 INJECTION INTRAVENOUS; SUBCUTANEOUS at 21:57

## 2018-07-03 RX ADMIN — POTASSIUM PHOSPHATE, MONOBASIC POTASSIUM PHOSPHATE, DIBASIC 64.25 MILLIMOLE(S): 236; 224 INJECTION, SOLUTION INTRAVENOUS at 12:04

## 2018-07-03 RX ADMIN — Medication 15 MILLIGRAM(S): at 12:04

## 2018-07-03 RX ADMIN — Medication 15 MILLIGRAM(S): at 23:03

## 2018-07-03 RX ADMIN — LEVETIRACETAM 400 MILLIGRAM(S): 250 TABLET, FILM COATED ORAL at 17:18

## 2018-07-03 NOTE — PHYSICAL THERAPY INITIAL EVALUATION ADULT - IMPAIRMENTS FOUND, PT EVAL
gross motor/aerobic capacity/endurance/gait, locomotion, and balance/muscle strength/poor safety awareness

## 2018-07-03 NOTE — PROGRESS NOTE ADULT - SUBJECTIVE AND OBJECTIVE BOX
Interval Events: Reviewed  Patient seen and examined at bedside.    Patient is a 73y old  Female who presents with a chief complaint of Colonic obstruction (2018 23:06)  she is doing well.  She still di not have a bowel movement    PAST MEDICAL & SURGICAL HISTORY:  Lyme disease  CAD (coronary artery disease)  Meningioma  High cholesterol  H/O meningioma of the brain  S/P EVERARDO (total abdominal hysterectomy)      MEDICATIONS:  Pulmonary:    Antimicrobials:    Anticoagulants:  aspirin  chewable 81 milliGRAM(s) Oral daily  heparin  Injectable 5000 Unit(s) SubCutaneous every 8 hours    Cardiac:      Allergies    No Known Allergies    Intolerances        Vital Signs Last 24 Hrs  T(C): 36.9 (2018 15:58), Max: 36.9 (2018 09:16)  T(F): 98.4 (2018 15:58), Max: 98.4 (2018 09:16)  HR: 82 (2018 15:58) (68 - 106)  BP: 147/89 (2018 15:58) (134/75 - 171/85)  BP(mean): 100 (2018 12:09) (96 - 120)  RR: 26 (2018 15:58) (20 - 35)  SpO2: 98% (2018 15:58) (89% - 98%)    02 @ 07: @ 07:00  --------------------------------------------------------  IN: 840 mL / OUT: 2000 mL / NET: -1160 mL     @ 07:  -   @ 22:19  --------------------------------------------------------  IN: 280 mL / OUT: 1600 mL / NET: -1320 mL          LABS:      CBC Full  -  ( 2018 06:28 )  WBC Count : 6.1 K/uL  Hemoglobin : 10.8 g/dL  Hematocrit : 32.3 %  Platelet Count - Automated : 249 K/uL  Mean Cell Volume : 92.6 fL  Mean Cell Hemoglobin : 30.9 pg  Mean Cell Hemoglobin Concentration : 33.4 g/dL  Auto Neutrophil # : x  Auto Lymphocyte # : x  Auto Monocyte # : x  Auto Eosinophil # : x  Auto Basophil # : x  Auto Neutrophil % : x  Auto Lymphocyte % : x  Auto Monocyte % : x  Auto Eosinophil % : x  Auto Basophil % : x        142  |  106  |  8   ----------------------------<  125<H>  3.8   |  26  |  0.50    Ca    8.5      2018 06:28  Phos  2.4     -  Mg     2.2     -            Urinalysis Basic - ( 2018 23:32 )    Color: Yellow / Appearance: Clear / S.025 / pH: x  Gluc: x / Ketone: 15 mg/dL  / Bili: Small / Urobili: 0.2 E.U./dL   Blood: x / Protein: Trace mg/dL / Nitrite: NEGATIVE   Leuk Esterase: NEGATIVE / RBC: > 10 /HPF / WBC < 5 /HPF   Sq Epi: x / Non Sq Epi: 0-5 /HPF / Bacteria: Present /HPF              Culture Results:   No growth at 1 day. ( @ 23:49)  Culture Results:   No growth at 1 day. ( @ 23:49)      RADIOLOGY & ADDITIONAL STUDIES (The following images were personally reviewed):  Sparks:                                     No  Urine output:                       adequate  DVT prophylaxis:                 Yes  Flattus:                                  Yes  Bowel movement:              No

## 2018-07-03 NOTE — PHYSICAL THERAPY INITIAL EVALUATION ADULT - PERTINENT HX OF CURRENT PROBLEM, REHAB EVAL
73F w/ CAD and prior colonic volvulus s/p ex lap/colopexy/appendectomy recently admitted with presumed ischemic colitis of the descending colon, readmitted 6/29 w/ LBO d/t sigmoid stricture past which sigmoidoscope could not be passed thus pt was taken to OR 6/30 for diverting loop sigmoidostomy for decompression.

## 2018-07-03 NOTE — PHYSICAL THERAPY INITIAL EVALUATION ADULT - ADDITIONAL COMMENTS
Pt lives alone in elevator access [facility] building w/ 1 step to enter. Denies use of DME prior to this admission. Denies hx of recent falls, no HHA, no HPT

## 2018-07-03 NOTE — PROGRESS NOTE ADULT - ASSESSMENT
Joel Encarnacion is a 72 y/o F POD 3 s/p diverting loop sigmoidostomy. She continues to recover well.    Neuro: Tylenol/dilaudid for pain. Keppra for hx seizures.   FEN/GI: D51/2 NS @70/hr for fluid maintenance, NPO until begins moving bowels  PPx: SCDs, SQ heparin Joel Encarnacion is a 74 y/o F POD 3 s/p diverting loop sigmoidostomy for LBO 2/2 sigmoid stricture. She continues to recover well.    Neuro: Tylenol/dilaudid for pain. Keppra for hx seizures.   FEN/GI: D51/2 NS @70/hr for fluid maintenance, advance to CLD, continue to monitor ostomy output  PPx: SCDs, SQ heparin  Dispo: step down to regional Joel Encarnacion is a 72 y/o F POD 3 s/p diverting loop sigmoidostomy for LBO 2/2 sigmoid stricture. She continues to recover well.    Neuro: Tylenol/dilaudid for pain. Keppra for hx seizures.   FEN/GI: D51/2 NS @70/hr for fluid maintenance, advance to CLD, continue to monitor ostomy output  PPx: SCDs, SQ heparin  Dispo: step down to regional today, PT recommends FRANKY on discharge

## 2018-07-03 NOTE — ADVANCED PRACTICE NURSE CONSULT - REASON FOR CONSULT
[facility] nurse follow up to instruct patient on ostomy care. Patient had refused during visit earlier in the day. Patient refused again, stating she was still feeling overwhelmed and could not participate today. [facility] nurse follow up to instruct patient on ostomy care. Patient had refused during visit earlier in the day. Patient refused again, stating she was still feeling overwhelmed and could not participate today. Ostomy supplies left at the bedside.

## 2018-07-03 NOTE — PHYSICAL THERAPY INITIAL EVALUATION ADULT - CRITERIA FOR SKILLED THERAPEUTIC INTERVENTIONS
impairments found/risk reduction/prevention/rehab potential/anticipated discharge recommendation/therapy frequency/functional limitations in following categories

## 2018-07-03 NOTE — PHYSICAL THERAPY INITIAL EVALUATION ADULT - GENERAL OBSERVATIONS, REHAB EVAL
Pt received semi-supine in bed +heplock, +tele, +ostomy, +abdominal wound dressing C/D/I, +3LPM O2 via NC (SpO2 decreased to 88 on room air), c/o abdominal pain

## 2018-07-03 NOTE — PHYSICAL THERAPY INITIAL EVALUATION ADULT - GAIT DEVIATIONS NOTED, PT EVAL
increased time in double stance/decreased step length/decreased weight-shifting ability/decreased luly

## 2018-07-03 NOTE — PROGRESS NOTE ADULT - SUBJECTIVE AND OBJECTIVE BOX
DAILY PROGRESS NOTE:    S: Joel Encarnacion is a 74 y/o F POD 3 s/p diverting loop sigmoidostomy. She is passing gas through her ostomy but still does not have stool output. Her pain is well controlled and she denies N/V. Her blood cultures remain negative.    O:   Vitals: Her BP remains stable at 130//85, otherwise vitals WNL.  Physical exam:  General - no acute distress  Pulm - normal resp effort and excursion  Abd - moderate stable R sided tenderness without guarding or rebound; dressing changed bedside, incision without erythema, purulence, or signs of infection; ostomy bag with gas and bowel sweat    I&O's: UOP 1200 cc/24 hr; no ostomy output    MEDICATIONS  (STANDING):  aspirin  chewable 81 milliGRAM(s) Oral daily  atorvastatin 40 milliGRAM(s) Oral at bedtime  dextrose 5% + sodium chloride 0.45% 1000 milliLiter(s) (70 mL/Hr) IV Continuous <Continuous>  heparin  Injectable 5000 Unit(s) SubCutaneous every 8 hours  ketorolac   Injectable 15 milliGRAM(s) IV Push every 6 hours  levETIRAcetam  IVPB 500 milliGRAM(s) IV Intermittent every 12 hours    MEDICATIONS  (PRN):  ondansetron Injectable 4 milliGRAM(s) IV Push every 6 hours PRN Nausea and/or Vomiting DAILY PROGRESS NOTE:    S: Joel Encarnacion is a 72 y/o F POD 3 s/p diverting loop sigmoidostomy. She is passing gas through her ostomy but still does not have stool output. Her pain is well controlled and she denies N/V. Her blood cultures remain negative.    O:   Vitals: Her BP remains stable at 130//85, otherwise vitals WNL.  Physical exam:  General - no acute distress  Pulm - normal resp effort and excursion  Abd - moderate stable R sided tenderness without guarding or rebound; dressing changed bedside, incision without erythema, purulence, or signs of infection; ostomy bag with gas and bowel sweat    I&O's: UOP 1200 cc/24 hr; no ostomy output DAILY PROGRESS NOTE:    S: Joel Encarnacion is a 74 y/o F POD 3 s/p diverting loop sigmoidostomy. She is passing gas through her ostomy but still does not have stool output. Her pain is well controlled and she denies N/V. Her blood cultures remain negative.    O:   Vitals: Her BP remains stable at 130//85, otherwise vitals WNL.  Physical exam:  General - no acute distress  Pulm - normal resp effort and excursion  Abd - moderate stable R sided tenderness without guarding or rebound; dressing changed bedside, incision without erythema, purulence, or signs of infection; ostomy bag with gas and bowel sweat    I&O's: UOP 1200 cc/24 hr; no ostomy output                          10.8   6.1   )-----------( 249      ( 03 Jul 2018 06:28 )             32.3   07-03    142  |  106  |  8   ----------------------------<  125<H>  3.8   |  26  |  0.50    Ca    8.5      03 Jul 2018 06:28  Phos  2.4     07-03  Mg     2.2     07-03

## 2018-07-03 NOTE — ADVANCED PRACTICE NURSE CONSULT - ASSESSMENT
Patient did not want to be taught at the present time stating she was feeling scattered and had received "some disturbing news." Patient also stated she was going to [facility]. Discussed with patient importance of being as independent as possible regarding ostomy care and emptying pouch in the vent she is discharged from [facility] with ostomy.

## 2018-07-04 LAB
ANION GAP SERPL CALC-SCNC: 15 MMOL/L — SIGNIFICANT CHANGE UP (ref 5–17)
BUN SERPL-MCNC: 4 MG/DL — LOW (ref 7–23)
CALCIUM SERPL-MCNC: 8.6 MG/DL — SIGNIFICANT CHANGE UP (ref 8.4–10.5)
CHLORIDE SERPL-SCNC: 105 MMOL/L — SIGNIFICANT CHANGE UP (ref 96–108)
CO2 SERPL-SCNC: 23 MMOL/L — SIGNIFICANT CHANGE UP (ref 22–31)
CREAT SERPL-MCNC: 0.52 MG/DL — SIGNIFICANT CHANGE UP (ref 0.5–1.3)
GLUCOSE SERPL-MCNC: 122 MG/DL — HIGH (ref 70–99)
HCT VFR BLD CALC: 34.2 % — LOW (ref 34.5–45)
HGB BLD-MCNC: 11.3 G/DL — LOW (ref 11.5–15.5)
MAGNESIUM SERPL-MCNC: 2.1 MG/DL — SIGNIFICANT CHANGE UP (ref 1.6–2.6)
MCHC RBC-ENTMCNC: 30.4 PG — SIGNIFICANT CHANGE UP (ref 27–34)
MCHC RBC-ENTMCNC: 33 G/DL — SIGNIFICANT CHANGE UP (ref 32–36)
MCV RBC AUTO: 91.9 FL — SIGNIFICANT CHANGE UP (ref 80–100)
PHOSPHATE SERPL-MCNC: 2.5 MG/DL — SIGNIFICANT CHANGE UP (ref 2.5–4.5)
PLATELET # BLD AUTO: 335 K/UL — SIGNIFICANT CHANGE UP (ref 150–400)
POTASSIUM SERPL-MCNC: 3.9 MMOL/L — SIGNIFICANT CHANGE UP (ref 3.5–5.3)
POTASSIUM SERPL-SCNC: 3.9 MMOL/L — SIGNIFICANT CHANGE UP (ref 3.5–5.3)
RBC # BLD: 3.72 M/UL — LOW (ref 3.8–5.2)
RBC # FLD: 14.4 % — SIGNIFICANT CHANGE UP (ref 10.3–16.9)
SODIUM SERPL-SCNC: 143 MMOL/L — SIGNIFICANT CHANGE UP (ref 135–145)
WBC # BLD: 7.2 K/UL — SIGNIFICANT CHANGE UP (ref 3.8–10.5)
WBC # FLD AUTO: 7.2 K/UL — SIGNIFICANT CHANGE UP (ref 3.8–10.5)

## 2018-07-04 PROCEDURE — 74018 RADEX ABDOMEN 1 VIEW: CPT | Mod: 26

## 2018-07-04 RX ORDER — ONDANSETRON 8 MG/1
4 TABLET, FILM COATED ORAL ONCE
Qty: 0 | Refills: 0 | Status: COMPLETED | OUTPATIENT
Start: 2018-07-04 | End: 2018-07-04

## 2018-07-04 RX ORDER — PANTOPRAZOLE SODIUM 20 MG/1
40 TABLET, DELAYED RELEASE ORAL ONCE
Qty: 0 | Refills: 0 | Status: COMPLETED | OUTPATIENT
Start: 2018-07-04 | End: 2018-07-04

## 2018-07-04 RX ORDER — METOCLOPRAMIDE HCL 10 MG
5 TABLET ORAL ONCE
Qty: 0 | Refills: 0 | Status: COMPLETED | OUTPATIENT
Start: 2018-07-04 | End: 2018-07-04

## 2018-07-04 RX ORDER — ACETAMINOPHEN 500 MG
1000 TABLET ORAL ONCE
Qty: 0 | Refills: 0 | Status: COMPLETED | OUTPATIENT
Start: 2018-07-05 | End: 2018-07-05

## 2018-07-04 RX ORDER — SODIUM CHLORIDE 9 MG/ML
1000 INJECTION, SOLUTION INTRAVENOUS
Qty: 0 | Refills: 0 | Status: DISCONTINUED | OUTPATIENT
Start: 2018-07-04 | End: 2018-07-06

## 2018-07-04 RX ORDER — ACETAMINOPHEN 500 MG
1000 TABLET ORAL ONCE
Qty: 0 | Refills: 0 | Status: COMPLETED | OUTPATIENT
Start: 2018-07-04 | End: 2018-07-04

## 2018-07-04 RX ORDER — ACETAMINOPHEN 500 MG
650 TABLET ORAL ONCE
Qty: 0 | Refills: 0 | Status: COMPLETED | OUTPATIENT
Start: 2018-07-04 | End: 2018-07-04

## 2018-07-04 RX ORDER — SIMETHICONE 80 MG/1
80 TABLET, CHEWABLE ORAL ONCE
Qty: 0 | Refills: 0 | Status: COMPLETED | OUTPATIENT
Start: 2018-07-04 | End: 2018-07-04

## 2018-07-04 RX ORDER — POTASSIUM PHOSPHATE, MONOBASIC POTASSIUM PHOSPHATE, DIBASIC 236; 224 MG/ML; MG/ML
21 INJECTION, SOLUTION INTRAVENOUS ONCE
Qty: 0 | Refills: 0 | Status: COMPLETED | OUTPATIENT
Start: 2018-07-04 | End: 2018-07-04

## 2018-07-04 RX ORDER — ACETAMINOPHEN 500 MG
325 TABLET ORAL EVERY 4 HOURS
Qty: 0 | Refills: 0 | Status: DISCONTINUED | OUTPATIENT
Start: 2018-07-04 | End: 2018-07-04

## 2018-07-04 RX ORDER — SODIUM CHLORIDE 9 MG/ML
1000 INJECTION, SOLUTION INTRAVENOUS
Qty: 0 | Refills: 0 | Status: DISCONTINUED | OUTPATIENT
Start: 2018-07-04 | End: 2018-07-04

## 2018-07-04 RX ORDER — METOCLOPRAMIDE HCL 10 MG
10 TABLET ORAL ONCE
Qty: 0 | Refills: 0 | Status: COMPLETED | OUTPATIENT
Start: 2018-07-04 | End: 2018-07-04

## 2018-07-04 RX ORDER — PANTOPRAZOLE SODIUM 20 MG/1
40 TABLET, DELAYED RELEASE ORAL DAILY
Qty: 0 | Refills: 0 | Status: DISCONTINUED | OUTPATIENT
Start: 2018-07-04 | End: 2018-07-05

## 2018-07-04 RX ORDER — BENZOCAINE AND MENTHOL 5; 1 G/100ML; G/100ML
1 LIQUID ORAL THREE TIMES A DAY
Qty: 0 | Refills: 0 | Status: DISCONTINUED | OUTPATIENT
Start: 2018-07-04 | End: 2018-07-09

## 2018-07-04 RX ADMIN — Medication 15 MILLIGRAM(S): at 17:03

## 2018-07-04 RX ADMIN — PANTOPRAZOLE SODIUM 40 MILLIGRAM(S): 20 TABLET, DELAYED RELEASE ORAL at 12:05

## 2018-07-04 RX ADMIN — Medication 15 MILLIGRAM(S): at 06:00

## 2018-07-04 RX ADMIN — HEPARIN SODIUM 5000 UNIT(S): 5000 INJECTION INTRAVENOUS; SUBCUTANEOUS at 22:08

## 2018-07-04 RX ADMIN — Medication 650 MILLIGRAM(S): at 14:32

## 2018-07-04 RX ADMIN — SIMETHICONE 80 MILLIGRAM(S): 80 TABLET, CHEWABLE ORAL at 15:47

## 2018-07-04 RX ADMIN — LEVETIRACETAM 400 MILLIGRAM(S): 250 TABLET, FILM COATED ORAL at 05:37

## 2018-07-04 RX ADMIN — Medication 15 MILLIGRAM(S): at 23:22

## 2018-07-04 RX ADMIN — Medication 650 MILLIGRAM(S): at 15:00

## 2018-07-04 RX ADMIN — POTASSIUM PHOSPHATE, MONOBASIC POTASSIUM PHOSPHATE, DIBASIC 64.25 MILLIMOLE(S): 236; 224 INJECTION, SOLUTION INTRAVENOUS at 10:34

## 2018-07-04 RX ADMIN — HEPARIN SODIUM 5000 UNIT(S): 5000 INJECTION INTRAVENOUS; SUBCUTANEOUS at 05:37

## 2018-07-04 RX ADMIN — Medication 15 MILLIGRAM(S): at 05:34

## 2018-07-04 RX ADMIN — LEVETIRACETAM 400 MILLIGRAM(S): 250 TABLET, FILM COATED ORAL at 17:03

## 2018-07-04 RX ADMIN — BENZOCAINE AND MENTHOL 1 LOZENGE: 5; 1 LIQUID ORAL at 11:58

## 2018-07-04 RX ADMIN — Medication 10 MILLIGRAM(S): at 18:19

## 2018-07-04 RX ADMIN — Medication 15 MILLIGRAM(S): at 17:32

## 2018-07-04 RX ADMIN — SODIUM CHLORIDE 110 MILLILITER(S): 9 INJECTION, SOLUTION INTRAVENOUS at 22:08

## 2018-07-04 RX ADMIN — Medication 15 MILLIGRAM(S): at 11:00

## 2018-07-04 RX ADMIN — Medication 400 MILLIGRAM(S): at 18:15

## 2018-07-04 RX ADMIN — Medication 81 MILLIGRAM(S): at 10:33

## 2018-07-04 RX ADMIN — ONDANSETRON 4 MILLIGRAM(S): 8 TABLET, FILM COATED ORAL at 15:47

## 2018-07-04 RX ADMIN — PANTOPRAZOLE SODIUM 40 MILLIGRAM(S): 20 TABLET, DELAYED RELEASE ORAL at 18:58

## 2018-07-04 RX ADMIN — Medication 15 MILLIGRAM(S): at 10:33

## 2018-07-04 RX ADMIN — SODIUM CHLORIDE 70 MILLILITER(S): 9 INJECTION, SOLUTION INTRAVENOUS at 05:38

## 2018-07-04 RX ADMIN — HEPARIN SODIUM 5000 UNIT(S): 5000 INJECTION INTRAVENOUS; SUBCUTANEOUS at 13:01

## 2018-07-04 NOTE — PROGRESS NOTE ADULT - SUBJECTIVE AND OBJECTIVE BOX
DAILY PROGRESS NOTE:    S: Joel Encarnacion is a 74 y/o F POD 3 s/p diverting loop sigmoidostomy. She is passing gas through her ostomy with bowel sweat but no other output. She is tolerating a CLD. Her pain is well controlled and she denies N/V. Her blood cultures remain negative.    O:     Vital Signs Last 24 Hrs  T(C): 37.4 (04 Jul 2018 08:43), Max: 37.7 (04 Jul 2018 05:20)  T(F): 99.4 (04 Jul 2018 08:43), Max: 99.8 (04 Jul 2018 05:20)  HR: 89 (04 Jul 2018 08:43) (82 - 106)  BP: 154/104 (04 Jul 2018 08:43) (134/75 - 171/85)  BP(mean): 100 (03 Jul 2018 12:09) (96 - 120)  RR: 17 (04 Jul 2018 08:43) (17 - 35)  SpO2: 97% (04 Jul 2018 08:43) (89% - 98%)    Physical exam:  General - no acute distress  Pulm - normal resp effort and excursion  Abd - soft with mild distension and R sided TTP unchanged from previous; dressing changed bedside, incision without erythema, purulence, or signs of infection; ostomy bag with gas and bowel sweat    I&O's: UOP 1700/12 hr, ostomy bowel sweat                                     11.3   7.2   )-----------( 335      ( 04 Jul 2018 07:05 )             34.2   07-04    143  |  105  |  4<L>  ----------------------------<  122<H>  3.9   |  23  |  0.52    Ca    8.6      04 Jul 2018 07:05  Phos  2.5     07-04  Mg     2.1     07-04 DAILY PROGRESS NOTE:    S: Joel Encarnacion is a 72 y/o F POD 3 s/p diverting loop sigmoidostomy. She is passing gas through her ostomy with bowel sweat but no other output. She is tolerating a CLD. Her pain is well controlled and she denies N/V. Her blood cultures remain negative.    O:     Vital Signs Last 24 Hrs  T(C): 37.4 (04 Jul 2018 08:43), Max: 37.7 (04 Jul 2018 05:20)  T(F): 99.4 (04 Jul 2018 08:43), Max: 99.8 (04 Jul 2018 05:20)  HR: 89 (04 Jul 2018 08:43) (82 - 106)  BP: 154/104 (04 Jul 2018 08:43) (134/75 - 171/85)  BP(mean): 100 (03 Jul 2018 12:09) (96 - 120)  RR: 17 (04 Jul 2018 08:43) (17 - 35)  SpO2: 97% (04 Jul 2018 08:43) (89% - 98%)    Physical exam:  General - no acute distress  Pulm - normal resp effort and excursion  Abd - mild distension and R sided TTP unchanged from previous; dressing changed bedside, incision without erythema, purulence, or signs of infection; ostomy bag with gas and bowel sweat    I&O's: UOP 1700/12 hr, ostomy bowel sweat                                     11.3   7.2   )-----------( 335      ( 04 Jul 2018 07:05 )             34.2   07-04    143  |  105  |  4<L>  ----------------------------<  122<H>  3.9   |  23  |  0.52    Ca    8.6      04 Jul 2018 07:05  Phos  2.5     07-04  Mg     2.1     07-04 DAILY PROGRESS NOTE:    S: Joel Encarnacion is a 74 y/o F POD 4 s/p diverting loop sigmoidostomy. She is passing gas through her ostomy with bowel sweat but no other output. She is tolerating a CLD. Her pain is well controlled and she denies N/V. Her blood cultures remain negative.    O:     Vital Signs Last 24 Hrs  T(C): 37.4 (04 Jul 2018 08:43), Max: 37.7 (04 Jul 2018 05:20)  T(F): 99.4 (04 Jul 2018 08:43), Max: 99.8 (04 Jul 2018 05:20)  HR: 89 (04 Jul 2018 08:43) (82 - 106)  BP: 154/104 (04 Jul 2018 08:43) (134/75 - 171/85)  BP(mean): 100 (03 Jul 2018 12:09) (96 - 120)  RR: 17 (04 Jul 2018 08:43) (17 - 35)  SpO2: 97% (04 Jul 2018 08:43) (89% - 98%)    Physical exam:  General - no acute distress  Pulm - normal resp effort and excursion  Abd - mild distension and R sided TTP unchanged from previous; dressing changed bedside, incision without erythema, purulence, or signs of infection; ostomy bag with gas and bowel sweat    I&O's: UOP 1700/12 hr, ostomy bowel sweat                                     11.3   7.2   )-----------( 335      ( 04 Jul 2018 07:05 )             34.2   07-04    143  |  105  |  4<L>  ----------------------------<  122<H>  3.9   |  23  |  0.52    Ca    8.6      04 Jul 2018 07:05  Phos  2.5     07-04  Mg     2.1     07-04

## 2018-07-04 NOTE — ADVANCED PRACTICE NURSE CONSULT - ASSESSMENT
Patient more receptive to teaching, however stating she is still feeling overwhelmed. Patient observed [facility]OCN change pouching system and measure stoma. Also, instructed patient on emptying pouching system. Patient participated in removal of pouching system and opening/closing pouching system.   Colostomy stoma well budded, slightly oblong shaped, edematous, measuring 2 inches, small amount of serosanguinous effluent noted in pouch. Peristomal skin intact. Applied Cavilon liquid skin barrier to peristomal skin, then Nelson 2 piece flat, 2 3/4 cut to fit pouching system.

## 2018-07-04 NOTE — PROGRESS NOTE ADULT - ASSESSMENT
Joel Encarnacion is a 72 y/o F POD 3 s/p diverting loop sigmoidostomy for LBO 2/2 sigmoid stricture. She continues to recover well.    Neuro: Tylenol. Keppra for hx seizures.   FEN/GI: d/c fluids, advance to low residue diet, continue to monitor ostomy output  PPx: SCDs, SQ heparin  Dispo: regional, PT recommends FRANKY on discharge Joel Encarnacion is a 72 y/o F POD 4 s/p diverting loop sigmoidostomy for LBO 2/2 sigmoid stricture. She continues to recover well.    Neuro: Tylenol. Keppra for hx seizures.   FEN/GI: d/c fluids, advance to low residue diet, continue to monitor ostomy output  PPx: SCDs, SQ heparin  Dispo: regional, PT recommends FRANKY on discharge

## 2018-07-05 DIAGNOSIS — I45.81 LONG QT SYNDROME: ICD-10-CM

## 2018-07-05 DIAGNOSIS — E87.2 ACIDOSIS: ICD-10-CM

## 2018-07-05 DIAGNOSIS — K52.9 NONINFECTIVE GASTROENTERITIS AND COLITIS, UNSPECIFIED: ICD-10-CM

## 2018-07-05 DIAGNOSIS — Z95.5 PRESENCE OF CORONARY ANGIOPLASTY IMPLANT AND GRAFT: ICD-10-CM

## 2018-07-05 DIAGNOSIS — I25.10 ATHEROSCLEROTIC HEART DISEASE OF NATIVE CORONARY ARTERY WITHOUT ANGINA PECTORIS: ICD-10-CM

## 2018-07-05 DIAGNOSIS — E78.00 PURE HYPERCHOLESTEROLEMIA, UNSPECIFIED: ICD-10-CM

## 2018-07-05 DIAGNOSIS — D72.829 ELEVATED WHITE BLOOD CELL COUNT, UNSPECIFIED: ICD-10-CM

## 2018-07-05 DIAGNOSIS — A09 INFECTIOUS GASTROENTERITIS AND COLITIS, UNSPECIFIED: ICD-10-CM

## 2018-07-05 DIAGNOSIS — Z79.82 LONG TERM (CURRENT) USE OF ASPIRIN: ICD-10-CM

## 2018-07-05 LAB
ANION GAP SERPL CALC-SCNC: 14 MMOL/L — SIGNIFICANT CHANGE UP (ref 5–17)
BUN SERPL-MCNC: 8 MG/DL — SIGNIFICANT CHANGE UP (ref 7–23)
CALCIUM SERPL-MCNC: 9.1 MG/DL — SIGNIFICANT CHANGE UP (ref 8.4–10.5)
CHLORIDE SERPL-SCNC: 101 MMOL/L — SIGNIFICANT CHANGE UP (ref 96–108)
CO2 SERPL-SCNC: 24 MMOL/L — SIGNIFICANT CHANGE UP (ref 22–31)
CREAT SERPL-MCNC: 0.46 MG/DL — LOW (ref 0.5–1.3)
CULTURE RESULTS: SIGNIFICANT CHANGE UP
CULTURE RESULTS: SIGNIFICANT CHANGE UP
GLUCOSE SERPL-MCNC: 149 MG/DL — HIGH (ref 70–99)
HCT VFR BLD CALC: 35.1 % — SIGNIFICANT CHANGE UP (ref 34.5–45)
HGB BLD-MCNC: 12.1 G/DL — SIGNIFICANT CHANGE UP (ref 11.5–15.5)
MAGNESIUM SERPL-MCNC: 1.9 MG/DL — SIGNIFICANT CHANGE UP (ref 1.6–2.6)
MCHC RBC-ENTMCNC: 31.3 PG — SIGNIFICANT CHANGE UP (ref 27–34)
MCHC RBC-ENTMCNC: 34.5 G/DL — SIGNIFICANT CHANGE UP (ref 32–36)
MCV RBC AUTO: 90.9 FL — SIGNIFICANT CHANGE UP (ref 80–100)
PHOSPHATE SERPL-MCNC: 2.8 MG/DL — SIGNIFICANT CHANGE UP (ref 2.5–4.5)
PLATELET # BLD AUTO: 388 K/UL — SIGNIFICANT CHANGE UP (ref 150–400)
POTASSIUM SERPL-MCNC: 3.9 MMOL/L — SIGNIFICANT CHANGE UP (ref 3.5–5.3)
POTASSIUM SERPL-SCNC: 3.9 MMOL/L — SIGNIFICANT CHANGE UP (ref 3.5–5.3)
RAPID RVP RESULT: SIGNIFICANT CHANGE UP
RBC # BLD: 3.86 M/UL — SIGNIFICANT CHANGE UP (ref 3.8–5.2)
RBC # FLD: 14.1 % — SIGNIFICANT CHANGE UP (ref 10.3–16.9)
SODIUM SERPL-SCNC: 139 MMOL/L — SIGNIFICANT CHANGE UP (ref 135–145)
SPECIMEN SOURCE: SIGNIFICANT CHANGE UP
SPECIMEN SOURCE: SIGNIFICANT CHANGE UP
WBC # BLD: 14.6 K/UL — HIGH (ref 3.8–10.5)
WBC # FLD AUTO: 14.6 K/UL — HIGH (ref 3.8–10.5)

## 2018-07-05 PROCEDURE — 71045 X-RAY EXAM CHEST 1 VIEW: CPT | Mod: 26,59

## 2018-07-05 PROCEDURE — 71046 X-RAY EXAM CHEST 2 VIEWS: CPT | Mod: 26

## 2018-07-05 PROCEDURE — 99232 SBSQ HOSP IP/OBS MODERATE 35: CPT | Mod: GC

## 2018-07-05 PROCEDURE — 93010 ELECTROCARDIOGRAM REPORT: CPT

## 2018-07-05 RX ORDER — PANTOPRAZOLE SODIUM 20 MG/1
40 TABLET, DELAYED RELEASE ORAL DAILY
Qty: 0 | Refills: 0 | Status: DISCONTINUED | OUTPATIENT
Start: 2018-07-05 | End: 2018-07-09

## 2018-07-05 RX ORDER — FAMOTIDINE 10 MG/ML
40 INJECTION INTRAVENOUS
Qty: 0 | Refills: 0 | Status: DISCONTINUED | OUTPATIENT
Start: 2018-07-05 | End: 2018-07-09

## 2018-07-05 RX ORDER — LABETALOL HCL 100 MG
10 TABLET ORAL ONCE
Qty: 0 | Refills: 0 | Status: COMPLETED | OUTPATIENT
Start: 2018-07-05 | End: 2018-07-05

## 2018-07-05 RX ORDER — SENNA PLUS 8.6 MG/1
2 TABLET ORAL AT BEDTIME
Qty: 0 | Refills: 0 | Status: DISCONTINUED | OUTPATIENT
Start: 2018-07-05 | End: 2018-07-09

## 2018-07-05 RX ORDER — LANOLIN ALCOHOL/MO/W.PET/CERES
5 CREAM (GRAM) TOPICAL AT BEDTIME
Qty: 0 | Refills: 0 | Status: DISCONTINUED | OUTPATIENT
Start: 2018-07-05 | End: 2018-07-09

## 2018-07-05 RX ORDER — CALCIUM CARBONATE 500(1250)
1 TABLET ORAL THREE TIMES A DAY
Qty: 0 | Refills: 0 | Status: DISCONTINUED | OUTPATIENT
Start: 2018-07-05 | End: 2018-07-05

## 2018-07-05 RX ORDER — OXYCODONE AND ACETAMINOPHEN 5; 325 MG/1; MG/1
1 TABLET ORAL EVERY 4 HOURS
Qty: 0 | Refills: 0 | Status: DISCONTINUED | OUTPATIENT
Start: 2018-07-05 | End: 2018-07-09

## 2018-07-05 RX ORDER — DOCUSATE SODIUM 100 MG
100 CAPSULE ORAL
Qty: 0 | Refills: 0 | Status: DISCONTINUED | OUTPATIENT
Start: 2018-07-05 | End: 2018-07-09

## 2018-07-05 RX ORDER — MAGNESIUM SULFATE 500 MG/ML
2 VIAL (ML) INJECTION ONCE
Qty: 0 | Refills: 0 | Status: COMPLETED | OUTPATIENT
Start: 2018-07-05 | End: 2018-07-05

## 2018-07-05 RX ORDER — ACETAMINOPHEN 500 MG
1000 TABLET ORAL ONCE
Qty: 0 | Refills: 0 | Status: COMPLETED | OUTPATIENT
Start: 2018-07-05 | End: 2018-07-05

## 2018-07-05 RX ORDER — FAMOTIDINE 10 MG/ML
20 INJECTION INTRAVENOUS DAILY
Qty: 0 | Refills: 0 | Status: DISCONTINUED | OUTPATIENT
Start: 2018-07-05 | End: 2018-07-05

## 2018-07-05 RX ADMIN — Medication 15 MILLIGRAM(S): at 05:42

## 2018-07-05 RX ADMIN — FAMOTIDINE 20 MILLIGRAM(S): 10 INJECTION INTRAVENOUS at 11:20

## 2018-07-05 RX ADMIN — Medication 400 MILLIGRAM(S): at 01:33

## 2018-07-05 RX ADMIN — FAMOTIDINE 40 MILLIGRAM(S): 10 INJECTION INTRAVENOUS at 22:48

## 2018-07-05 RX ADMIN — Medication 100 MILLIGRAM(S): at 18:17

## 2018-07-05 RX ADMIN — ATORVASTATIN CALCIUM 40 MILLIGRAM(S): 80 TABLET, FILM COATED ORAL at 22:46

## 2018-07-05 RX ADMIN — ONDANSETRON 4 MILLIGRAM(S): 8 TABLET, FILM COATED ORAL at 01:21

## 2018-07-05 RX ADMIN — Medication 10 MILLIGRAM(S): at 06:27

## 2018-07-05 RX ADMIN — Medication 400 MILLIGRAM(S): at 09:50

## 2018-07-05 RX ADMIN — Medication 50 GRAM(S): at 08:13

## 2018-07-05 RX ADMIN — SODIUM CHLORIDE 75 MILLILITER(S): 9 INJECTION, SOLUTION INTRAVENOUS at 22:45

## 2018-07-05 RX ADMIN — HEPARIN SODIUM 5000 UNIT(S): 5000 INJECTION INTRAVENOUS; SUBCUTANEOUS at 05:42

## 2018-07-05 RX ADMIN — Medication 15 MILLIGRAM(S): at 06:15

## 2018-07-05 RX ADMIN — HEPARIN SODIUM 5000 UNIT(S): 5000 INJECTION INTRAVENOUS; SUBCUTANEOUS at 22:46

## 2018-07-05 RX ADMIN — LEVETIRACETAM 400 MILLIGRAM(S): 250 TABLET, FILM COATED ORAL at 05:42

## 2018-07-05 RX ADMIN — LEVETIRACETAM 400 MILLIGRAM(S): 250 TABLET, FILM COATED ORAL at 18:17

## 2018-07-05 RX ADMIN — PANTOPRAZOLE SODIUM 40 MILLIGRAM(S): 20 TABLET, DELAYED RELEASE ORAL at 06:04

## 2018-07-05 RX ADMIN — Medication 1000 MILLIGRAM(S): at 10:30

## 2018-07-05 RX ADMIN — Medication 81 MILLIGRAM(S): at 11:20

## 2018-07-05 RX ADMIN — Medication 1000 MILLIGRAM(S): at 02:00

## 2018-07-05 RX ADMIN — SENNA PLUS 2 TABLET(S): 8.6 TABLET ORAL at 22:46

## 2018-07-05 RX ADMIN — Medication 15 MILLIGRAM(S): at 00:00

## 2018-07-05 RX ADMIN — OXYCODONE AND ACETAMINOPHEN 1 TABLET(S): 5; 325 TABLET ORAL at 16:21

## 2018-07-05 RX ADMIN — Medication 5 MILLIGRAM(S): at 22:46

## 2018-07-05 RX ADMIN — HEPARIN SODIUM 5000 UNIT(S): 5000 INJECTION INTRAVENOUS; SUBCUTANEOUS at 14:03

## 2018-07-05 RX ADMIN — Medication 30 MILLILITER(S): at 15:35

## 2018-07-05 RX ADMIN — OXYCODONE AND ACETAMINOPHEN 1 TABLET(S): 5; 325 TABLET ORAL at 17:15

## 2018-07-05 NOTE — CHART NOTE - NSCHARTNOTEFT_GEN_A_CORE
Admitting Diagnosis:   Patient is a 73y old  Female who presents with a chief complaint of Colonic obstruction (29 Jun 2018 23:06)      PAST MEDICAL & SURGICAL HISTORY:  Lyme disease  CAD (coronary artery disease)  Meningioma  High cholesterol  H/O meningioma of the brain  S/P EVERARDO (total abdominal hysterectomy)      Current Nutrition Order: Clear Liquids    PO Intake: Good (%) [   ]  Fair (50-75%) [   ] Poor (<25%) [  x ] Sipping water    GI Issues: Denies N/V at present.  Had 1 episode emesis o/n  +formed stool/ +flatus  Colostomy    Pain:  C/o general pain -not associated w/ drinking    Skin Integrity:  Surgical incision    Labs:   07-05    139  |  101  |  8   ----------------------------<  149<H>  3.9   |  24  |  0.46<L>    Ca    9.1      05 Jul 2018 06:50  Phos  2.8     07-05  Mg     1.9     07-05      CAPILLARY BLOOD GLUCOSE          Medications:  MEDICATIONS  (STANDING):  aspirin  chewable 81 milliGRAM(s) Oral daily  atorvastatin 40 milliGRAM(s) Oral at bedtime  dextrose 5% + sodium chloride 0.45% 1000 milliLiter(s) (75 mL/Hr) IV Continuous <Continuous>  famotidine    Tablet 20 milliGRAM(s) Oral daily  heparin  Injectable 5000 Unit(s) SubCutaneous every 8 hours  levETIRAcetam  IVPB 500 milliGRAM(s) IV Intermittent every 12 hours  pantoprazole  Injectable 40 milliGRAM(s) IV Push daily    MEDICATIONS  (PRN):  benzocaine 15 mG/menthol 3.6 mG Lozenge 1 Lozenge Oral three times a day PRN Sore Throat  ondansetron Injectable 4 milliGRAM(s) IV Push every 6 hours PRN Nausea and/or Vomiting      Weight: 156.5lb  IBW: 59kg  %IBW: 120%  Daily     Weight Change:   States UBW is 135-145lbs, current documented wt is 156lbs -please obtain new wt for accuracy    Estimated energy needs:   ABW used for calculations as pt between % of IBW.   Nutrient needs based on [facility] standards of care for maintenance in older adults.   Needs adjusted for post-op and age.  1420-1775kcal/day (20-25kcal/kg)  85-99g pro/day  (1.2-1.4g/kg)  2130-2485ml fluid/day (30-35ml/kg)    Subjective:   72yo F s/p diverting loop sigmoid colostomy POD5 for obstruction likely d/t diverticular stricture. Pt seen resting in bed. Diet advanced to clears today. Sipping water this am. Reports emesis O/N as she felt it would provide relief. C/o acid reflux and indigestion and that protonix did not help- Discussed w/ RN and pt will also be receiving pepcid. Pt c/o generalized pain, not specifically after drinking. Discussed diet progression to likely full liquids >> low fiber pending medical clearance and po tolerance. Offered to review colostomy nutrition therapy, however pt declined as she was very lethargic. States UBW is 135-145lbs, current documented wt is 156lbs -please obtain new wt for accuracy. NKFA or dietary restrictions. Skin: surgical incision; GI: distended, colostomy per flowsheet.     Previous Nutrition Diagnosis:  inadequate oral intake RT altered GI function, inability to take PO AEB NPO status, NGT to suction.  Active [   ]  Resolved [ x  ]    If resolved, new PES:   Inadequate oral intake RT difficulty meeting needs on CLD order AEB pt consuming <25% estimated needs PO    Goal:  Pt to meet >75% estimated needs PO    Recommendations:  1) Recommend slow advancement of Clears >> Full liquids >> Low fiber once deemed medically feasible  2) Monitor tolerance as diet is advanced  3) Obtain new wt for accuracy    Education: diet advancement process. offered to review ostomy nutrition therapy- pt declined as she was lethargic.    Risk Level: High [ x  ] Moderate [   ] Low [   ]

## 2018-07-05 NOTE — PROGRESS NOTE ADULT - SUBJECTIVE AND OBJECTIVE BOX
Subjective:  Patient is improved today. She states her pain and nausea is improving. She admits to 1 episode of emesis O/N (100cc). She is passing formed stool and gas through her ostomy. She states her bloating is improving.     Objective:     Vital Signs Last 24 Hrs  T(C): 37.4 (05 Jul 2018 05:54), Max: 38.1 (04 Jul 2018 16:41)  T(F): 99.3 (05 Jul 2018 05:54), Max: 100.6 (04 Jul 2018 16:41)  HR: 83 (05 Jul 2018 06:30) (83 - 100)  BP: 148/100 (05 Jul 2018 06:30) (148/100 - 181/91)  BP(mean): --  RR: 16 (05 Jul 2018 06:30) (16 - 19)  SpO2: 95% (05 Jul 2018 06:30) (93% - 97%)  I&O's Summary    04 Jul 2018 07:01  -  05 Jul 2018 07:00  --------------------------------------------------------  IN: 1150 mL / OUT: 3151 mL / NET: -2001 mL      Physical exam:  General - no acute distress  Pulm - normal resp effort and excursion  Abd - mild distension, soft to palpation, mild TTP, dressing changed bedside, incision C/D/I, ostomy bag with gas and formed stool                        12.1   14.6  )-----------( 388      ( 05 Jul 2018 06:50 )             35.1   07-05    139  |  101  |  8   ----------------------------<  149<H>  3.9   |  24  |  0.46<L>    Ca    9.1      05 Jul 2018 06:50  Phos  2.8     07-05  Mg     1.9     07-05 Subjective:  Patient is improved today, but complains of poor sleep. She states her pain and nausea is improving. She admits to 1 episode of emesis O/N (100cc). She is passing formed stool and gas through her ostomy. She states her bloating is improving.     Objective:     Vital Signs Last 24 Hrs  T(C): 37.4 (05 Jul 2018 05:54), Max: 38.1 (04 Jul 2018 16:41)  T(F): 99.3 (05 Jul 2018 05:54), Max: 100.6 (04 Jul 2018 16:41)  HR: 83 (05 Jul 2018 06:30) (83 - 100)  BP: 148/100 (05 Jul 2018 06:30) (148/100 - 181/91)  BP(mean): --  RR: 16 (05 Jul 2018 06:30) (16 - 19)  SpO2: 95% (05 Jul 2018 06:30) (93% - 97%)  I&O's Summary    04 Jul 2018 07:01  -  05 Jul 2018 07:00  --------------------------------------------------------  IN: 1150 mL / OUT: 3151 mL / NET: -2001 mL      Physical exam:  General - no acute distress  Pulm - normal resp effort and excursion  Abd - mild distension, soft to palpation, mild TTP, dressing changed bedside, incision C/D/I, ostomy bag with gas and formed stool                        12.1   14.6  )-----------( 388      ( 05 Jul 2018 06:50 )             35.1   07-05    139  |  101  |  8   ----------------------------<  149<H>  3.9   |  24  |  0.46<L>    Ca    9.1      05 Jul 2018 06:50  Phos  2.8     07-05  Mg     1.9     07-05

## 2018-07-05 NOTE — PROGRESS NOTE ADULT - SUBJECTIVE AND OBJECTIVE BOX
Interval Events: Reviewed  Patient seen and examined at bedside.    Patient is a 73y old  Female who presents with a chief complaint of Colonic obstruction (29 Jun 2018 23:06)    The patient is coughing. She's having abdominal discomfort especially after she ate yesterday  PAST MEDICAL & SURGICAL HISTORY:  Lyme disease  CAD (coronary artery disease)  Meningioma  High cholesterol  H/O meningioma of the brain  S/P EVERARDO (total abdominal hysterectomy)      MEDICATIONS:  Pulmonary:    Antimicrobials:    Anticoagulants:  aspirin  chewable 81 milliGRAM(s) Oral daily  heparin  Injectable 5000 Unit(s) SubCutaneous every 8 hours    Cardiac:      Allergies    No Known Allergies    Intolerances        Vital Signs Last 24 Hrs  T(C): 36.9 (05 Jul 2018 17:45), Max: 37.5 (04 Jul 2018 23:45)  T(F): 98.4 (05 Jul 2018 17:45), Max: 99.5 (04 Jul 2018 23:45)  HR: 102 (05 Jul 2018 17:45) (83 - 114)  BP: 135/75 (05 Jul 2018 17:45) (125/87 - 181/91)  BP(mean): --  RR: 16 (05 Jul 2018 17:45) (16 - 19)  SpO2: 95% (05 Jul 2018 17:45) (93% - 97%)    07-04 @ 07:01 - 07-05 @ 07:00  --------------------------------------------------------  IN: 1150 mL / OUT: 3151 mL / NET: -2001 mL    07-05 @ 07:01 - 07-05 @ 21:55  --------------------------------------------------------  IN: 550 mL / OUT: 775 mL / NET: -225 mL          LABS:      CBC Full  -  ( 05 Jul 2018 06:50 )  WBC Count : 14.6 K/uL  Hemoglobin : 12.1 g/dL  Hematocrit : 35.1 %  Platelet Count - Automated : 388 K/uL  Mean Cell Volume : 90.9 fL  Mean Cell Hemoglobin : 31.3 pg  Mean Cell Hemoglobin Concentration : 34.5 g/dL  Auto Neutrophil # : x  Auto Lymphocyte # : x  Auto Monocyte # : x  Auto Eosinophil # : x  Auto Basophil # : x  Auto Neutrophil % : x  Auto Lymphocyte % : x  Auto Monocyte % : x  Auto Eosinophil % : x  Auto Basophil % : x    07-05    139  |  101  |  8   ----------------------------<  149<H>  3.9   |  24  |  0.46<L>    Ca    9.1      05 Jul 2018 06:50  Phos  2.8     07-05  Mg     1.9     07-05        < from: Xray Chest 1 View AP/PA (07.05.18 @ 08:53) >  EXAM:  XR CHEST AP OR PA 1V                          PROCEDURE DATE:  07/05/2018          INTERPRETATION:  Chest frontal view exam    History: Productive cough    No definite focal infiltrate is noted on this exam. Possible infiltrate   in left lung base on prior abdomen x-ray 7/4/18 is not deftly appreciated   however this exam is somewhat limited.      < end of copied text >  < from: 12 Lead ECG (07.05.18 @ 13:33) >    Ventricular Rate 105 BPM    Atrial Rate 105 BPM    P-R Interval 130 ms    QRS Duration 66 ms    Q-T Interval 360 ms    QTC Calculation(Bezet) 475 ms    P Axis 51 degrees    R Axis 41 degrees    T Axis 63 degrees    Diagnosis Line Sinus tachycardia  Possible Left atrial enlargement    < end of copied text >  RVP negative                  RADIOLOGY & ADDITIONAL STUDIES (The following images were personally reviewed):  Sparks:                                     No  Urine output:                       adequate  DVT prophylaxis:                 Yes  Flattus:                                  Yes  Bowel movement:              No

## 2018-07-05 NOTE — PROGRESS NOTE ADULT - SUBJECTIVE AND OBJECTIVE BOX
Vomited yesterday. Still having air via colostomy. Main complaint is lack of sleep  Has a new cough, does not recall when it started. Non-productive    Low grade temps  VSS  Abd soft, mod distended.  Incision clean  Colostomy with air, minimal superficial slough    A/P: POD 5 loop sigmoid colostomy for obstruction likely due to diverticular stricture.  Ileus  1. Sips of clear liquids  2. OOB, IS.  PT  3. CXR    Dr. Mueller covering until 7/9

## 2018-07-05 NOTE — PROGRESS NOTE ADULT - ASSESSMENT
Joel Encarnacion is a 74 y/o F POD 5 s/p diverting loop sigmoidostomy, doing well.     Neuro: Tylenol. Keppra for hx seizures.   FEN/GI: d/c fluids, advance to CLD, continue to monitor ostomy output  HTN: Labetalol 10 mg PRN  PPx: SCDs, SQ heparin  Dispo: regional, PT recommends FRANKY on discharge Joel Encarnacion is a 72 y/o F POD 5 s/p diverting loop sigmoidostomy, doing well.     Neuro: Tylenol. Keppra for hx seizures.   FEN/GI: d/c fluids, advance to CLD, continue to monitor ostomy output  HTN: Labetalol 10 mg PRN  PPx: SCDs, SQ heparin  Dispo: regional, PT recommends FRANKY on discharge  Resp: cxr ordered for new cough, IS

## 2018-07-06 LAB
ANION GAP SERPL CALC-SCNC: 13 MMOL/L — SIGNIFICANT CHANGE UP (ref 5–17)
BASOPHILS NFR BLD AUTO: 0.3 % — SIGNIFICANT CHANGE UP (ref 0–2)
BUN SERPL-MCNC: 13 MG/DL — SIGNIFICANT CHANGE UP (ref 7–23)
CALCIUM SERPL-MCNC: 8.3 MG/DL — LOW (ref 8.4–10.5)
CHLORIDE SERPL-SCNC: 103 MMOL/L — SIGNIFICANT CHANGE UP (ref 96–108)
CO2 SERPL-SCNC: 22 MMOL/L — SIGNIFICANT CHANGE UP (ref 22–31)
CREAT SERPL-MCNC: 0.59 MG/DL — SIGNIFICANT CHANGE UP (ref 0.5–1.3)
EOSINOPHIL NFR BLD AUTO: 1 % — SIGNIFICANT CHANGE UP (ref 0–6)
GLUCOSE SERPL-MCNC: 126 MG/DL — HIGH (ref 70–99)
HCT VFR BLD CALC: 32 % — LOW (ref 34.5–45)
HGB BLD-MCNC: 10.5 G/DL — LOW (ref 11.5–15.5)
LYMPHOCYTES # BLD AUTO: 7.7 % — LOW (ref 13–44)
MAGNESIUM SERPL-MCNC: 2.1 MG/DL — SIGNIFICANT CHANGE UP (ref 1.6–2.6)
MCHC RBC-ENTMCNC: 30.8 PG — SIGNIFICANT CHANGE UP (ref 27–34)
MCHC RBC-ENTMCNC: 32.8 G/DL — SIGNIFICANT CHANGE UP (ref 32–36)
MCV RBC AUTO: 93.8 FL — SIGNIFICANT CHANGE UP (ref 80–100)
MONOCYTES NFR BLD AUTO: 4.8 % — SIGNIFICANT CHANGE UP (ref 2–14)
NEUTROPHILS NFR BLD AUTO: 86.2 % — HIGH (ref 43–77)
PHOSPHATE SERPL-MCNC: 2.9 MG/DL — SIGNIFICANT CHANGE UP (ref 2.5–4.5)
PLATELET # BLD AUTO: 320 K/UL — SIGNIFICANT CHANGE UP (ref 150–400)
POTASSIUM SERPL-MCNC: 4 MMOL/L — SIGNIFICANT CHANGE UP (ref 3.5–5.3)
POTASSIUM SERPL-SCNC: 4 MMOL/L — SIGNIFICANT CHANGE UP (ref 3.5–5.3)
RBC # BLD: 3.41 M/UL — LOW (ref 3.8–5.2)
RBC # FLD: 14.5 % — SIGNIFICANT CHANGE UP (ref 10.3–16.9)
SODIUM SERPL-SCNC: 138 MMOL/L — SIGNIFICANT CHANGE UP (ref 135–145)
WBC # BLD: 14.3 K/UL — HIGH (ref 3.8–10.5)
WBC # FLD AUTO: 14.3 K/UL — HIGH (ref 3.8–10.5)

## 2018-07-06 PROCEDURE — 99232 SBSQ HOSP IP/OBS MODERATE 35: CPT | Mod: GC

## 2018-07-06 RX ORDER — POTASSIUM PHOSPHATE, MONOBASIC POTASSIUM PHOSPHATE, DIBASIC 236; 224 MG/ML; MG/ML
21 INJECTION, SOLUTION INTRAVENOUS ONCE
Qty: 0 | Refills: 0 | Status: COMPLETED | OUTPATIENT
Start: 2018-07-06 | End: 2018-07-06

## 2018-07-06 RX ADMIN — ATORVASTATIN CALCIUM 40 MILLIGRAM(S): 80 TABLET, FILM COATED ORAL at 21:33

## 2018-07-06 RX ADMIN — PANTOPRAZOLE SODIUM 40 MILLIGRAM(S): 20 TABLET, DELAYED RELEASE ORAL at 06:19

## 2018-07-06 RX ADMIN — OXYCODONE AND ACETAMINOPHEN 1 TABLET(S): 5; 325 TABLET ORAL at 06:27

## 2018-07-06 RX ADMIN — HEPARIN SODIUM 5000 UNIT(S): 5000 INJECTION INTRAVENOUS; SUBCUTANEOUS at 22:00

## 2018-07-06 RX ADMIN — FAMOTIDINE 40 MILLIGRAM(S): 10 INJECTION INTRAVENOUS at 18:18

## 2018-07-06 RX ADMIN — Medication 5 MILLIGRAM(S): at 21:33

## 2018-07-06 RX ADMIN — SENNA PLUS 2 TABLET(S): 8.6 TABLET ORAL at 21:33

## 2018-07-06 RX ADMIN — HEPARIN SODIUM 5000 UNIT(S): 5000 INJECTION INTRAVENOUS; SUBCUTANEOUS at 14:04

## 2018-07-06 RX ADMIN — POTASSIUM PHOSPHATE, MONOBASIC POTASSIUM PHOSPHATE, DIBASIC 64.25 MILLIMOLE(S): 236; 224 INJECTION, SOLUTION INTRAVENOUS at 14:47

## 2018-07-06 RX ADMIN — Medication 100 MILLIGRAM(S): at 06:19

## 2018-07-06 RX ADMIN — OXYCODONE AND ACETAMINOPHEN 1 TABLET(S): 5; 325 TABLET ORAL at 22:00

## 2018-07-06 RX ADMIN — Medication 81 MILLIGRAM(S): at 12:05

## 2018-07-06 RX ADMIN — HEPARIN SODIUM 5000 UNIT(S): 5000 INJECTION INTRAVENOUS; SUBCUTANEOUS at 06:20

## 2018-07-06 RX ADMIN — OXYCODONE AND ACETAMINOPHEN 1 TABLET(S): 5; 325 TABLET ORAL at 07:00

## 2018-07-06 RX ADMIN — OXYCODONE AND ACETAMINOPHEN 1 TABLET(S): 5; 325 TABLET ORAL at 21:34

## 2018-07-06 RX ADMIN — Medication 100 MILLIGRAM(S): at 18:18

## 2018-07-06 RX ADMIN — FAMOTIDINE 40 MILLIGRAM(S): 10 INJECTION INTRAVENOUS at 06:19

## 2018-07-06 RX ADMIN — LEVETIRACETAM 400 MILLIGRAM(S): 250 TABLET, FILM COATED ORAL at 06:19

## 2018-07-06 RX ADMIN — LEVETIRACETAM 400 MILLIGRAM(S): 250 TABLET, FILM COATED ORAL at 18:17

## 2018-07-06 NOTE — PROGRESS NOTE ADULT - ASSESSMENT
Joel Encarnacion is a 72 y/o F POD 5 s/p diverting loop sigmoidostomy. She continues to improve and her ostomy output is increasing.     Neuro: Tylenol. Keppra for hx seizures.   FEN/GI: FLD, continue to monitor ostomy output  Heme: RLE TTP and WBC elevated, DP pulse OK, ordered duplex US  HTN: Labetalol 10 mg PRN  PPx: SCDs, SQ heparin  Dispo: regional, PT recommends FRANKY on discharge  Resp: cxr ordered for new cough, IS Joel Encarnacion is a 74 y/o F POD 5 s/p diverting loop sigmoidostomy. She continues to improve and her ostomy output is increasing.     Neuro: Tylenol. Keppra for hx seizures.   FEN/GI: d/c fluids, FLD, continue to monitor ostomy output  Heme: RLE TTP and WBC elevated, DP pulse OK and no discoloration/swelling, ordered duplex US  HTN: Labetalol 10 mg PRN  PPx: SCDs, SQ heparin  Dispo: regional, PT recommends FRANKY on discharge  Resp: cxr ordered for new cough, IS Joel Encarnacion is a 74 y/o F POD 5 s/p diverting loop sigmoidostomy. She continues to improve and her ostomy output is increasing.     Neuro: Tylenol. percocet q4 PRN. Keppra for hx seizures.   FEN/GI: d/c fluids, FLD, continue to monitor ostomy output  Heme: RLE TTP and WBC elevated, DP pulse OK and no discoloration/swelling, ordered duplex US  PPx: SCDs, SQ heparin  Dispo: regional, PT recommends FRANKY on discharge  Resp: cxr ordered for new cough, IS

## 2018-07-06 NOTE — PROGRESS NOTE ADULT - SUBJECTIVE AND OBJECTIVE BOX
Interval Events: Reviewed  Patient seen and examined at bedside.    Patient is a 73y old  Female who presents with a chief complaint of Colonic obstruction (29 Jun 2018 23:06)    The patient is coughing. She's is better but still nauseous  PAST MEDICAL & SURGICAL HISTORY:  Lyme disease  CAD (coronary artery disease)  Meningioma  High cholesterol  H/O meningioma of the brain  S/P EVERARDO (total abdominal hysterectomy)      MEDICATIONS:  Pulmonary:    Antimicrobials:    Anticoagulants:  aspirin  chewable 81 milliGRAM(s) Oral daily  heparin  Injectable 5000 Unit(s) SubCutaneous every 8 hours    Cardiac:      Allergies    No Known Allergies    Intolerances        Vital Signs Last 24 Hrs  T(C): 36.9 (05 Jul 2018 17:45), Max: 37.5 (04 Jul 2018 23:45)  T(F): 98.4 (05 Jul 2018 17:45), Max: 99.5 (04 Jul 2018 23:45)  HR: 102 (05 Jul 2018 17:45) (83 - 114)  BP: 135/75 (05 Jul 2018 17:45) (125/87 - 181/91)  BP(mean): --  RR: 16 (05 Jul 2018 17:45) (16 - 19)  SpO2: 95% (05 Jul 2018 17:45) (93% - 97%)    07-04 @ 07:01 - 07-05 @ 07:00  --------------------------------------------------------  IN: 1150 mL / OUT: 3151 mL / NET: -2001 mL    07-05 @ 07:01 - 07-05 @ 21:55  --------------------------------------------------------  IN: 550 mL / OUT: 775 mL / NET: -225 mL          LABS:      CBC Full  -  ( 05 Jul 2018 06:50 )  WBC Count : 14.6 K/uL  Hemoglobin : 12.1 g/dL  Hematocrit : 35.1 %  Platelet Count - Automated : 388 K/uL  Mean Cell Volume : 90.9 fL  Mean Cell Hemoglobin : 31.3 pg  Mean Cell Hemoglobin Concentration : 34.5 g/dL  Auto Neutrophil # : x  Auto Lymphocyte # : x  Auto Monocyte # : x  Auto Eosinophil # : x  Auto Basophil # : x  Auto Neutrophil % : x  Auto Lymphocyte % : x  Auto Monocyte % : x  Auto Eosinophil % : x  Auto Basophil % : x    07-05    139  |  101  |  8   ----------------------------<  149<H>  3.9   |  24  |  0.46<L>    Ca    9.1      05 Jul 2018 06:50  Phos  2.8     07-05  Mg     1.9     07-05            RADIOLOGY & ADDITIONAL STUDIES (The following images were personally reviewed):  Sparks:                                     No  Urine output:                       adequate  DVT prophylaxis:                 Yes  Flattus:                                  Yes  Bowel movement:              No

## 2018-07-06 NOTE — ADVANCED PRACTICE NURSE CONSULT - ASSESSMENT
Pouching system changed during previous shift. Patient able to empty pouch with WOCN coaching. Used ostomy model to instruct on measuring stoma and changing pouching system. Patient able to change pouching system with WOCN coaching step by step. Provided patient with written instructions for changing pouching system. Also placed list of required ostomy supplies on the front of the patient's chart. Faxed [employer] enrollment form to [employer]. Provided patient with copies of all forms.

## 2018-07-06 NOTE — ADVANCED PRACTICE NURSE CONSULT - RECOMMEDATIONS
Will continue to follow for ostomy teaching.
Will return to continue instruction.
Patient continues to require assistance emptying and changing ostomy pouching system.
Patient requires additional teaching.
Patient will require further ostomy care teaching.
WOCN will return in the afternoon.

## 2018-07-06 NOTE — PROGRESS NOTE ADULT - SUBJECTIVE AND OBJECTIVE BOX
Subjective: Patient feels "fair." Her indigestion is improving but still experiencing abdominal pain. Her WBC is elevated but stable (14.3 today, 14.6 yesterday).     Objective:     Vital Signs Last 24 Hrs  T(C): 37.4 (05 Jul 2018 05:54), Max: 38.1 (04 Jul 2018 16:41)  T(F): 99.3 (05 Jul 2018 05:54), Max: 100.6 (04 Jul 2018 16:41)  HR: 83 (05 Jul 2018 06:30) (83 - 100)  BP: 148/100 (05 Jul 2018 06:30) (148/100 - 181/91)  BP(mean): --  RR: 16 (05 Jul 2018 06:30) (16 - 19)  SpO2: 95% (05 Jul 2018 06:30) (93% - 97%)  I&O's Summary    04 Jul 2018 07:01  -  05 Jul 2018 07:00  --------------------------------------------------------  IN: 1150 mL / OUT: 3151 mL / NET: -2001 mL      Physical exam:  General - no acute distress  Pulm - normal resp effort and excursion  Abd - mild distension, soft to palpation, mild TTP, incision C/D/I, ostomy bag with gas and formed stool  Extremities - R leg with TTP, non-discolored, good DP pulses bilaterally                         12.1   14.6  )-----------( 388      ( 05 Jul 2018 06:50 )             35.1   07-05    139  |  101  |  8   ----------------------------<  149<H>  3.9   |  24  |  0.46<L>    Ca    9.1      05 Jul 2018 06:50  Phos  2.8     07-05  Mg     1.9     07-05

## 2018-07-06 NOTE — PROGRESS NOTE ADULT - SUBJECTIVE AND OBJECTIVE BOX
tolerating some PO  AVSS  Abd soft   stoma functioning    W/U for leukocytosis so far negative    increase diet  check labs  OOB  pulmonary toilet

## 2018-07-07 LAB
ANION GAP SERPL CALC-SCNC: 12 MMOL/L — SIGNIFICANT CHANGE UP (ref 5–17)
APPEARANCE UR: CLEAR — SIGNIFICANT CHANGE UP
BILIRUB UR-MCNC: NEGATIVE — SIGNIFICANT CHANGE UP
BUN SERPL-MCNC: 10 MG/DL — SIGNIFICANT CHANGE UP (ref 7–23)
CALCIUM SERPL-MCNC: 8.5 MG/DL — SIGNIFICANT CHANGE UP (ref 8.4–10.5)
CHLORIDE SERPL-SCNC: 104 MMOL/L — SIGNIFICANT CHANGE UP (ref 96–108)
CO2 SERPL-SCNC: 23 MMOL/L — SIGNIFICANT CHANGE UP (ref 22–31)
COLOR SPEC: YELLOW — SIGNIFICANT CHANGE UP
CREAT SERPL-MCNC: 0.61 MG/DL — SIGNIFICANT CHANGE UP (ref 0.5–1.3)
CULTURE RESULTS: SIGNIFICANT CHANGE UP
CULTURE RESULTS: SIGNIFICANT CHANGE UP
DIFF PNL FLD: NEGATIVE — SIGNIFICANT CHANGE UP
GLUCOSE SERPL-MCNC: 114 MG/DL — HIGH (ref 70–99)
GLUCOSE UR QL: NEGATIVE — SIGNIFICANT CHANGE UP
HCT VFR BLD CALC: 30.6 % — LOW (ref 34.5–45)
HGB BLD-MCNC: 10.2 G/DL — LOW (ref 11.5–15.5)
KETONES UR-MCNC: NEGATIVE — SIGNIFICANT CHANGE UP
LEUKOCYTE ESTERASE UR-ACNC: NEGATIVE — SIGNIFICANT CHANGE UP
MAGNESIUM SERPL-MCNC: 2 MG/DL — SIGNIFICANT CHANGE UP (ref 1.6–2.6)
MCHC RBC-ENTMCNC: 30.9 PG — SIGNIFICANT CHANGE UP (ref 27–34)
MCHC RBC-ENTMCNC: 33.3 G/DL — SIGNIFICANT CHANGE UP (ref 32–36)
MCV RBC AUTO: 92.7 FL — SIGNIFICANT CHANGE UP (ref 80–100)
NITRITE UR-MCNC: NEGATIVE — SIGNIFICANT CHANGE UP
PH UR: 6 — SIGNIFICANT CHANGE UP (ref 5–8)
PHOSPHATE SERPL-MCNC: 3.5 MG/DL — SIGNIFICANT CHANGE UP (ref 2.5–4.5)
PLATELET # BLD AUTO: 369 K/UL — SIGNIFICANT CHANGE UP (ref 150–400)
POTASSIUM SERPL-MCNC: 3.9 MMOL/L — SIGNIFICANT CHANGE UP (ref 3.5–5.3)
POTASSIUM SERPL-SCNC: 3.9 MMOL/L — SIGNIFICANT CHANGE UP (ref 3.5–5.3)
PROT UR-MCNC: NEGATIVE MG/DL — SIGNIFICANT CHANGE UP
RBC # BLD: 3.3 M/UL — LOW (ref 3.8–5.2)
RBC # FLD: 14.4 % — SIGNIFICANT CHANGE UP (ref 10.3–16.9)
SODIUM SERPL-SCNC: 139 MMOL/L — SIGNIFICANT CHANGE UP (ref 135–145)
SP GR SPEC: 1.01 — SIGNIFICANT CHANGE UP (ref 1–1.03)
SPECIMEN SOURCE: SIGNIFICANT CHANGE UP
SPECIMEN SOURCE: SIGNIFICANT CHANGE UP
UROBILINOGEN FLD QL: 0.2 E.U./DL — SIGNIFICANT CHANGE UP
WBC # BLD: 9.5 K/UL — SIGNIFICANT CHANGE UP (ref 3.8–10.5)
WBC # FLD AUTO: 9.5 K/UL — SIGNIFICANT CHANGE UP (ref 3.8–10.5)

## 2018-07-07 PROCEDURE — 93970 EXTREMITY STUDY: CPT | Mod: 26

## 2018-07-07 PROCEDURE — 71045 X-RAY EXAM CHEST 1 VIEW: CPT | Mod: 26

## 2018-07-07 RX ORDER — POTASSIUM CHLORIDE 20 MEQ
20 PACKET (EA) ORAL ONCE
Qty: 0 | Refills: 0 | Status: COMPLETED | OUTPATIENT
Start: 2018-07-07 | End: 2018-07-07

## 2018-07-07 RX ORDER — POTASSIUM CHLORIDE 20 MEQ
40 PACKET (EA) ORAL ONCE
Qty: 0 | Refills: 0 | Status: DISCONTINUED | OUTPATIENT
Start: 2018-07-07 | End: 2018-07-07

## 2018-07-07 RX ADMIN — ATORVASTATIN CALCIUM 40 MILLIGRAM(S): 80 TABLET, FILM COATED ORAL at 23:23

## 2018-07-07 RX ADMIN — OXYCODONE AND ACETAMINOPHEN 1 TABLET(S): 5; 325 TABLET ORAL at 05:41

## 2018-07-07 RX ADMIN — FAMOTIDINE 40 MILLIGRAM(S): 10 INJECTION INTRAVENOUS at 17:37

## 2018-07-07 RX ADMIN — Medication 5 MILLIGRAM(S): at 23:23

## 2018-07-07 RX ADMIN — Medication 100 MILLIGRAM(S): at 17:37

## 2018-07-07 RX ADMIN — Medication 81 MILLIGRAM(S): at 12:08

## 2018-07-07 RX ADMIN — OXYCODONE AND ACETAMINOPHEN 1 TABLET(S): 5; 325 TABLET ORAL at 18:00

## 2018-07-07 RX ADMIN — HEPARIN SODIUM 5000 UNIT(S): 5000 INJECTION INTRAVENOUS; SUBCUTANEOUS at 06:00

## 2018-07-07 RX ADMIN — Medication 20 MILLIEQUIVALENT(S): at 12:08

## 2018-07-07 RX ADMIN — LEVETIRACETAM 400 MILLIGRAM(S): 250 TABLET, FILM COATED ORAL at 06:00

## 2018-07-07 RX ADMIN — PANTOPRAZOLE SODIUM 40 MILLIGRAM(S): 20 TABLET, DELAYED RELEASE ORAL at 05:40

## 2018-07-07 RX ADMIN — OXYCODONE AND ACETAMINOPHEN 1 TABLET(S): 5; 325 TABLET ORAL at 17:37

## 2018-07-07 RX ADMIN — FAMOTIDINE 40 MILLIGRAM(S): 10 INJECTION INTRAVENOUS at 05:41

## 2018-07-07 RX ADMIN — OXYCODONE AND ACETAMINOPHEN 1 TABLET(S): 5; 325 TABLET ORAL at 23:55

## 2018-07-07 RX ADMIN — HEPARIN SODIUM 5000 UNIT(S): 5000 INJECTION INTRAVENOUS; SUBCUTANEOUS at 23:23

## 2018-07-07 RX ADMIN — HEPARIN SODIUM 5000 UNIT(S): 5000 INJECTION INTRAVENOUS; SUBCUTANEOUS at 13:40

## 2018-07-07 RX ADMIN — OXYCODONE AND ACETAMINOPHEN 1 TABLET(S): 5; 325 TABLET ORAL at 23:23

## 2018-07-07 RX ADMIN — SENNA PLUS 2 TABLET(S): 8.6 TABLET ORAL at 23:23

## 2018-07-07 RX ADMIN — Medication 100 MILLIGRAM(S): at 05:41

## 2018-07-07 RX ADMIN — LEVETIRACETAM 400 MILLIGRAM(S): 250 TABLET, FILM COATED ORAL at 17:38

## 2018-07-07 RX ADMIN — OXYCODONE AND ACETAMINOPHEN 1 TABLET(S): 5; 325 TABLET ORAL at 06:00

## 2018-07-07 NOTE — PROGRESS NOTE ADULT - ASSESSMENT
Joel Encarnacion is a 72 y/o F POD 5 s/p diverting loop sigmoidostomy. She continues to improve and her ostomy output is increasing. She had a mild fever to 100.9 today, but other vitals WNL.    Neuro: Tylenol. percocet q4 PRN. Keppra for hx seizures.   FEN/GI: low residue diet, continue to monitor ostomy output  ID: f/u fever workup (CXR, cultures, UA)  PPx: SCDs, SQ heparin  Dispo: regional, PT recommends FRANKY on discharge  Resp: cxr ordered for new cough, IS

## 2018-07-07 NOTE — PROGRESS NOTE ADULT - SUBJECTIVE AND OBJECTIVE BOX
Subjective: Leukocytosis resolved. Duplex today found no DVT. Patient tolerating LRD. Fever of 100.9 in afternoon, other vital signs WNL and patient non-toxic appearing.    Objective:     ICU Vital Signs Last 24 Hrs  T(C): 38.3 (07 Jul 2018 16:37), Max: 38.3 (07 Jul 2018 16:37)  T(F): 100.9 (07 Jul 2018 16:37), Max: 100.9 (07 Jul 2018 16:37)  HR: 89 (07 Jul 2018 16:37) (79 - 89)  BP: 132/83 (07 Jul 2018 16:37) (122/71 - 145/90)  BP(mean): --  ABP: --  ABP(mean): --  RR: 17 (07 Jul 2018 16:37) (14 - 18)  SpO2: 93% (07 Jul 2018 16:37) (92% - 94%)      I&O's Detail    06 Jul 2018 07:01  -  07 Jul 2018 07:00  --------------------------------------------------------  IN:    Oral Fluid: 540 mL    Solution: 100 mL  Total IN: 640 mL    OUT:    Colostomy: 250 mL    Voided: 2050 mL  Total OUT: 2300 mL    Total NET: -1660 mL      07 Jul 2018 07:01  -  07 Jul 2018 22:20  --------------------------------------------------------  IN:    Oral Fluid: 480 mL  Total IN: 480 mL    OUT:    Voided: 1650 mL  Total OUT: 1650 mL    Total NET: -1170 mL      07-07    139  |  104  |  10  ----------------------------<  114<H>  3.9   |  23  |  0.61    Ca    8.5      07 Jul 2018 06:27  Phos  3.5     07-07  Mg     2.0     07-07                          10.2   9.5   )-----------( 369      ( 07 Jul 2018 06:27 )             30.6         Physical exam:  General - no acute distress  Pulm - normal resp effort and excursion  Abd - mild distension, soft to palpation, mild TTP, incision C/D/I, ostomy bag with gas and liquid stool  Extremities - no TTP in LE bilaterally

## 2018-07-08 LAB
ANION GAP SERPL CALC-SCNC: 12 MMOL/L — SIGNIFICANT CHANGE UP (ref 5–17)
BUN SERPL-MCNC: 8 MG/DL — SIGNIFICANT CHANGE UP (ref 7–23)
CALCIUM SERPL-MCNC: 8.9 MG/DL — SIGNIFICANT CHANGE UP (ref 8.4–10.5)
CHLORIDE SERPL-SCNC: 101 MMOL/L — SIGNIFICANT CHANGE UP (ref 96–108)
CO2 SERPL-SCNC: 25 MMOL/L — SIGNIFICANT CHANGE UP (ref 22–31)
CREAT SERPL-MCNC: 0.64 MG/DL — SIGNIFICANT CHANGE UP (ref 0.5–1.3)
GLUCOSE SERPL-MCNC: 128 MG/DL — HIGH (ref 70–99)
HCT VFR BLD CALC: 30.7 % — LOW (ref 34.5–45)
HGB BLD-MCNC: 10.2 G/DL — LOW (ref 11.5–15.5)
MAGNESIUM SERPL-MCNC: 2.1 MG/DL — SIGNIFICANT CHANGE UP (ref 1.6–2.6)
MCHC RBC-ENTMCNC: 30.7 PG — SIGNIFICANT CHANGE UP (ref 27–34)
MCHC RBC-ENTMCNC: 33.2 G/DL — SIGNIFICANT CHANGE UP (ref 32–36)
MCV RBC AUTO: 92.5 FL — SIGNIFICANT CHANGE UP (ref 80–100)
PHOSPHATE SERPL-MCNC: 3.4 MG/DL — SIGNIFICANT CHANGE UP (ref 2.5–4.5)
PLATELET # BLD AUTO: 417 K/UL — HIGH (ref 150–400)
POTASSIUM SERPL-MCNC: 3.8 MMOL/L — SIGNIFICANT CHANGE UP (ref 3.5–5.3)
POTASSIUM SERPL-SCNC: 3.8 MMOL/L — SIGNIFICANT CHANGE UP (ref 3.5–5.3)
RBC # BLD: 3.32 M/UL — LOW (ref 3.8–5.2)
RBC # FLD: 14.1 % — SIGNIFICANT CHANGE UP (ref 10.3–16.9)
SODIUM SERPL-SCNC: 138 MMOL/L — SIGNIFICANT CHANGE UP (ref 135–145)
WBC # BLD: 7.5 K/UL — SIGNIFICANT CHANGE UP (ref 3.8–10.5)
WBC # FLD AUTO: 7.5 K/UL — SIGNIFICANT CHANGE UP (ref 3.8–10.5)

## 2018-07-08 RX ORDER — ACETAMINOPHEN 500 MG
650 TABLET ORAL ONCE
Qty: 0 | Refills: 0 | Status: COMPLETED | OUTPATIENT
Start: 2018-07-08 | End: 2018-07-08

## 2018-07-08 RX ADMIN — PANTOPRAZOLE SODIUM 40 MILLIGRAM(S): 20 TABLET, DELAYED RELEASE ORAL at 06:00

## 2018-07-08 RX ADMIN — HEPARIN SODIUM 5000 UNIT(S): 5000 INJECTION INTRAVENOUS; SUBCUTANEOUS at 06:00

## 2018-07-08 RX ADMIN — OXYCODONE AND ACETAMINOPHEN 1 TABLET(S): 5; 325 TABLET ORAL at 22:01

## 2018-07-08 RX ADMIN — LEVETIRACETAM 400 MILLIGRAM(S): 250 TABLET, FILM COATED ORAL at 19:21

## 2018-07-08 RX ADMIN — SENNA PLUS 2 TABLET(S): 8.6 TABLET ORAL at 22:01

## 2018-07-08 RX ADMIN — HEPARIN SODIUM 5000 UNIT(S): 5000 INJECTION INTRAVENOUS; SUBCUTANEOUS at 22:01

## 2018-07-08 RX ADMIN — OXYCODONE AND ACETAMINOPHEN 1 TABLET(S): 5; 325 TABLET ORAL at 12:17

## 2018-07-08 RX ADMIN — Medication 100 MILLIGRAM(S): at 19:21

## 2018-07-08 RX ADMIN — HEPARIN SODIUM 5000 UNIT(S): 5000 INJECTION INTRAVENOUS; SUBCUTANEOUS at 13:18

## 2018-07-08 RX ADMIN — Medication 100 MILLIGRAM(S): at 05:18

## 2018-07-08 RX ADMIN — Medication 5 MILLIGRAM(S): at 22:01

## 2018-07-08 RX ADMIN — ATORVASTATIN CALCIUM 40 MILLIGRAM(S): 80 TABLET, FILM COATED ORAL at 22:01

## 2018-07-08 RX ADMIN — LEVETIRACETAM 400 MILLIGRAM(S): 250 TABLET, FILM COATED ORAL at 05:18

## 2018-07-08 RX ADMIN — Medication 81 MILLIGRAM(S): at 12:17

## 2018-07-08 RX ADMIN — OXYCODONE AND ACETAMINOPHEN 1 TABLET(S): 5; 325 TABLET ORAL at 05:19

## 2018-07-08 RX ADMIN — FAMOTIDINE 40 MILLIGRAM(S): 10 INJECTION INTRAVENOUS at 12:29

## 2018-07-08 RX ADMIN — Medication 650 MILLIGRAM(S): at 14:38

## 2018-07-08 RX ADMIN — OXYCODONE AND ACETAMINOPHEN 1 TABLET(S): 5; 325 TABLET ORAL at 13:18

## 2018-07-08 RX ADMIN — FAMOTIDINE 40 MILLIGRAM(S): 10 INJECTION INTRAVENOUS at 05:18

## 2018-07-08 RX ADMIN — OXYCODONE AND ACETAMINOPHEN 1 TABLET(S): 5; 325 TABLET ORAL at 06:00

## 2018-07-08 RX ADMIN — OXYCODONE AND ACETAMINOPHEN 1 TABLET(S): 5; 325 TABLET ORAL at 22:45

## 2018-07-08 NOTE — PROGRESS NOTE ADULT - ASSESSMENT
Joel Encarnacion is a 72 y/o F POD 6 s/p diverting loop sigmoidostomy, doing well.     Neuro: Tylenol. percocet q4 PRN. Keppra for hx seizures.   FEN/GI: LRD. continue to monitor ostomy output  ID: fever workup negative, repeat CXR today - continue to monitor   Increase ambulation   Incentive spirometry   PPx: SCDs, SQ heparin  Dispo: regional, PT recommends FRANKY on discharge Joel Encarnacion is a 72 y/o F POD 6 s/p diverting loop sigmoidostomy, doing well.     Neuro: Tylenol. percocet q4 PRN. Keppra for hx seizures.   FEN/GI: LRD. continue to monitor ostomy output  ID: fever workup negative - continue to monitor   Increase ambulation   Incentive spirometry   PPx: SCDs, SQ heparin  Dispo: regional, PT recommends FRANKY on discharge

## 2018-07-08 NOTE — PROGRESS NOTE ADULT - SUBJECTIVE AND OBJECTIVE BOX
S: Patient continues to improve. Pt states her pain is improving. She denies any nausea/vomiting. She states her appetite is improving. Cough is improving.     O:     Vital Signs Last 24 Hrs  T(C): 37.1 (08 Jul 2018 05:47), Max: 38.3 (07 Jul 2018 16:37)  T(F): 98.8 (08 Jul 2018 05:47), Max: 100.9 (07 Jul 2018 16:37)  HR: 75 (08 Jul 2018 05:47) (75 - 89)  BP: 147/84 (08 Jul 2018 05:47) (122/71 - 147/84)  BP(mean): --  RR: 16 (08 Jul 2018 05:47) (15 - 18)  SpO2: 94% (08 Jul 2018 05:47) (92% - 94%)  I&O's Summary    07 Jul 2018 07:01  -  08 Jul 2018 07:00  --------------------------------------------------------  IN: 700 mL / OUT: 2370 mL / NET: -1670 mL    08 Jul 2018 07:01  -  08 Jul 2018 10:23  --------------------------------------------------------  IN: 360 mL / OUT: 450 mL / NET: -90 mL        Physical exam:  General - NAD  Pulm - normal resp effort   Abd - mild distension, soft to palpation, mild TTP R side > L, incision C/D/I, ostomy bag with gas, bowel sweat, and soft stool  Extremities - no TTP, erythema, warmpth in LE bilaterally                            10.2   7.5   )-----------( 417      ( 08 Jul 2018 06:14 )             30.7   07-08    138  |  101  |  8   ----------------------------<  128<H>  3.8   |  25  |  0.64    Ca    8.9      08 Jul 2018 06:12  Phos  3.4     07-08  Mg     2.1     07-08 S: Patient continues to improve. Pt states her pain is improving. She denies any nausea/vomiting. She states her appetite is improving and she is tolerating her diet well. Cough is improving.     O:     Vital Signs Last 24 Hrs  T(C): 37.1 (08 Jul 2018 05:47), Max: 38.3 (07 Jul 2018 16:37)  T(F): 98.8 (08 Jul 2018 05:47), Max: 100.9 (07 Jul 2018 16:37)  HR: 75 (08 Jul 2018 05:47) (75 - 89)  BP: 147/84 (08 Jul 2018 05:47) (122/71 - 147/84)  BP(mean): --  RR: 16 (08 Jul 2018 05:47) (15 - 18)  SpO2: 94% (08 Jul 2018 05:47) (92% - 94%)  I&O's Summary    07 Jul 2018 07:01  -  08 Jul 2018 07:00  --------------------------------------------------------  IN: 700 mL / OUT: 2370 mL / NET: -1670 mL    08 Jul 2018 07:01  -  08 Jul 2018 10:23  --------------------------------------------------------  IN: 360 mL / OUT: 450 mL / NET: -90 mL        Physical exam:  General - NAD  Pulm - normal resp effort   Abd - mild distension, soft to palpation, mild TTP R side > L, incision C/D/I, ostomy bag with gas, bowel sweat, and soft stool  Extremities - no TTP, erythema, warmpth in LE bilaterally                            10.2   7.5   )-----------( 417      ( 08 Jul 2018 06:14 )             30.7   07-08    138  |  101  |  8   ----------------------------<  128<H>  3.8   |  25  |  0.64    Ca    8.9      08 Jul 2018 06:12  Phos  3.4     07-08  Mg     2.1     07-08

## 2018-07-09 ENCOUNTER — TRANSCRIPTION ENCOUNTER (OUTPATIENT)
Age: 73
End: 2018-07-09

## 2018-07-09 VITALS
TEMPERATURE: 99 F | HEART RATE: 70 BPM | OXYGEN SATURATION: 96 % | SYSTOLIC BLOOD PRESSURE: 154 MMHG | RESPIRATION RATE: 18 BRPM | DIASTOLIC BLOOD PRESSURE: 88 MMHG

## 2018-07-09 LAB
ANION GAP SERPL CALC-SCNC: 14 MMOL/L — SIGNIFICANT CHANGE UP (ref 5–17)
BUN SERPL-MCNC: 11 MG/DL — SIGNIFICANT CHANGE UP (ref 7–23)
CALCIUM SERPL-MCNC: 8.8 MG/DL — SIGNIFICANT CHANGE UP (ref 8.4–10.5)
CHLORIDE SERPL-SCNC: 102 MMOL/L — SIGNIFICANT CHANGE UP (ref 96–108)
CO2 SERPL-SCNC: 22 MMOL/L — SIGNIFICANT CHANGE UP (ref 22–31)
CREAT SERPL-MCNC: 0.62 MG/DL — SIGNIFICANT CHANGE UP (ref 0.5–1.3)
GLUCOSE SERPL-MCNC: 116 MG/DL — HIGH (ref 70–99)
HCT VFR BLD CALC: 31.5 % — LOW (ref 34.5–45)
HGB BLD-MCNC: 10.5 G/DL — LOW (ref 11.5–15.5)
MAGNESIUM SERPL-MCNC: 2.1 MG/DL — SIGNIFICANT CHANGE UP (ref 1.6–2.6)
MCHC RBC-ENTMCNC: 31 PG — SIGNIFICANT CHANGE UP (ref 27–34)
MCHC RBC-ENTMCNC: 33.3 G/DL — SIGNIFICANT CHANGE UP (ref 32–36)
MCV RBC AUTO: 92.9 FL — SIGNIFICANT CHANGE UP (ref 80–100)
PHOSPHATE SERPL-MCNC: 3.3 MG/DL — SIGNIFICANT CHANGE UP (ref 2.5–4.5)
PLATELET # BLD AUTO: 470 K/UL — HIGH (ref 150–400)
POTASSIUM SERPL-MCNC: 4 MMOL/L — SIGNIFICANT CHANGE UP (ref 3.5–5.3)
POTASSIUM SERPL-SCNC: 4 MMOL/L — SIGNIFICANT CHANGE UP (ref 3.5–5.3)
RBC # BLD: 3.39 M/UL — LOW (ref 3.8–5.2)
RBC # FLD: 13.9 % — SIGNIFICANT CHANGE UP (ref 10.3–16.9)
SODIUM SERPL-SCNC: 138 MMOL/L — SIGNIFICANT CHANGE UP (ref 135–145)
WBC # BLD: 7.5 K/UL — SIGNIFICANT CHANGE UP (ref 3.8–10.5)
WBC # FLD AUTO: 7.5 K/UL — SIGNIFICANT CHANGE UP (ref 3.8–10.5)

## 2018-07-09 PROCEDURE — 85730 THROMBOPLASTIN TIME PARTIAL: CPT

## 2018-07-09 PROCEDURE — 96374 THER/PROPH/DIAG INJ IV PUSH: CPT | Mod: XU

## 2018-07-09 PROCEDURE — 80053 COMPREHEN METABOLIC PANEL: CPT

## 2018-07-09 PROCEDURE — 96376 TX/PRO/DX INJ SAME DRUG ADON: CPT

## 2018-07-09 PROCEDURE — 87040 BLOOD CULTURE FOR BACTERIA: CPT

## 2018-07-09 PROCEDURE — 71046 X-RAY EXAM CHEST 2 VIEWS: CPT

## 2018-07-09 PROCEDURE — 81001 URINALYSIS AUTO W/SCOPE: CPT

## 2018-07-09 PROCEDURE — 80048 BASIC METABOLIC PNL TOTAL CA: CPT

## 2018-07-09 PROCEDURE — 85025 COMPLETE CBC W/AUTO DIFF WBC: CPT

## 2018-07-09 PROCEDURE — 74177 CT ABD & PELVIS W/CONTRAST: CPT

## 2018-07-09 PROCEDURE — 36415 COLL VENOUS BLD VENIPUNCTURE: CPT

## 2018-07-09 PROCEDURE — 99285 EMERGENCY DEPT VISIT HI MDM: CPT | Mod: 25

## 2018-07-09 PROCEDURE — 86850 RBC ANTIBODY SCREEN: CPT

## 2018-07-09 PROCEDURE — 83690 ASSAY OF LIPASE: CPT

## 2018-07-09 PROCEDURE — 87581 M.PNEUMON DNA AMP PROBE: CPT

## 2018-07-09 PROCEDURE — 85610 PROTHROMBIN TIME: CPT

## 2018-07-09 PROCEDURE — 74018 RADEX ABDOMEN 1 VIEW: CPT

## 2018-07-09 PROCEDURE — 81003 URINALYSIS AUTO W/O SCOPE: CPT

## 2018-07-09 PROCEDURE — 97116 GAIT TRAINING THERAPY: CPT

## 2018-07-09 PROCEDURE — 84100 ASSAY OF PHOSPHORUS: CPT

## 2018-07-09 PROCEDURE — 83735 ASSAY OF MAGNESIUM: CPT

## 2018-07-09 PROCEDURE — 86900 BLOOD TYPING SEROLOGIC ABO: CPT

## 2018-07-09 PROCEDURE — 93970 EXTREMITY STUDY: CPT

## 2018-07-09 PROCEDURE — 96375 TX/PRO/DX INJ NEW DRUG ADDON: CPT

## 2018-07-09 PROCEDURE — 71045 X-RAY EXAM CHEST 1 VIEW: CPT

## 2018-07-09 PROCEDURE — 93005 ELECTROCARDIOGRAM TRACING: CPT

## 2018-07-09 PROCEDURE — 97163 PT EVAL HIGH COMPLEX 45 MIN: CPT

## 2018-07-09 PROCEDURE — 87486 CHLMYD PNEUM DNA AMP PROBE: CPT

## 2018-07-09 PROCEDURE — 83605 ASSAY OF LACTIC ACID: CPT

## 2018-07-09 PROCEDURE — 82962 GLUCOSE BLOOD TEST: CPT

## 2018-07-09 PROCEDURE — 86901 BLOOD TYPING SEROLOGIC RH(D): CPT

## 2018-07-09 PROCEDURE — 87798 DETECT AGENT NOS DNA AMP: CPT

## 2018-07-09 PROCEDURE — 99232 SBSQ HOSP IP/OBS MODERATE 35: CPT | Mod: GC

## 2018-07-09 PROCEDURE — 85027 COMPLETE CBC AUTOMATED: CPT

## 2018-07-09 PROCEDURE — 87633 RESP VIRUS 12-25 TARGETS: CPT

## 2018-07-09 RX ORDER — POLYETHYLENE GLYCOL 3350 17 G/17G
17 POWDER, FOR SOLUTION ORAL DAILY
Qty: 0 | Refills: 0 | Status: DISCONTINUED | OUTPATIENT
Start: 2018-07-10 | End: 2018-07-09

## 2018-07-09 RX ORDER — LEVETIRACETAM 250 MG/1
500 TABLET, FILM COATED ORAL
Qty: 0 | Refills: 0 | Status: DISCONTINUED | OUTPATIENT
Start: 2018-07-09 | End: 2018-07-09

## 2018-07-09 RX ORDER — LEVETIRACETAM 250 MG/1
1 TABLET, FILM COATED ORAL
Qty: 0 | Refills: 0 | COMMUNITY
Start: 2018-07-09

## 2018-07-09 RX ORDER — POLYETHYLENE GLYCOL 3350 17 G/17G
17 POWDER, FOR SOLUTION ORAL ONCE
Qty: 0 | Refills: 0 | Status: COMPLETED | OUTPATIENT
Start: 2018-07-09 | End: 2018-07-09

## 2018-07-09 RX ADMIN — PANTOPRAZOLE SODIUM 40 MILLIGRAM(S): 20 TABLET, DELAYED RELEASE ORAL at 05:53

## 2018-07-09 RX ADMIN — Medication 81 MILLIGRAM(S): at 12:10

## 2018-07-09 RX ADMIN — POLYETHYLENE GLYCOL 3350 17 GRAM(S): 17 POWDER, FOR SOLUTION ORAL at 12:10

## 2018-07-09 RX ADMIN — HEPARIN SODIUM 5000 UNIT(S): 5000 INJECTION INTRAVENOUS; SUBCUTANEOUS at 05:52

## 2018-07-09 RX ADMIN — Medication 100 MILLIGRAM(S): at 05:52

## 2018-07-09 RX ADMIN — FAMOTIDINE 40 MILLIGRAM(S): 10 INJECTION INTRAVENOUS at 05:52

## 2018-07-09 RX ADMIN — OXYCODONE AND ACETAMINOPHEN 1 TABLET(S): 5; 325 TABLET ORAL at 05:52

## 2018-07-09 RX ADMIN — LEVETIRACETAM 400 MILLIGRAM(S): 250 TABLET, FILM COATED ORAL at 05:52

## 2018-07-09 NOTE — PROGRESS NOTE ADULT - PROVIDER SPECIALTY LIST ADULT
CTU
Colorectal Surgery
Internal Medicine
SICU
Surgery
Gastroenterology
Surgery
Internal Medicine
Internal Medicine

## 2018-07-09 NOTE — DISCHARGE NOTE ADULT - MEDICATION SUMMARY - MEDICATIONS TO TAKE
I will START or STAY ON the medications listed below when I get home from the hospital:    aspirin 81 mg oral tablet  -- 1 tab(s) by mouth once a day  -- Indication: For Home medication    levETIRAcetam 500 mg oral tablet  -- 1 tab(s) by mouth 2 times a day  -- Indication: For Home medication    atorvastatin  -- 40 milligram(s) by mouth once (at bedtime)  -- Indication: For Home medication    simethicone 40 mg oral disintegrating strip  -- 40 milligram(s) by mouth 3 times a day (after meals), As Needed   -- Indication: For Home medication

## 2018-07-09 NOTE — PROGRESS NOTE ADULT - SUBJECTIVE AND OBJECTIVE BOX
SUBJECTIVE: Patient seen and examined bedside by chief resident. No acute events overnight. Denies fever, chills, nausea, emesis, chest pain, dyspnea, abdominal pain.     aspirin  chewable 81 milliGRAM(s) Oral daily  heparin  Injectable 5000 Unit(s) SubCutaneous every 8 hours      Vital Signs Last 24 Hrs  T(C): 37 (2018 09:36), Max: 37.8 (2018 12:52)  T(F): 98.6 (2018 09:36), Max: 100.1 (2018 12:52)  HR: 70 (2018 09:36) (70 - 95)  BP: 154/88 (2018 09:36) (124/80 - 157/94)  BP(mean): --  RR: 18 (2018 09:36) (16 - 18)  SpO2: 96% (2018 09:36) (95% - 96%)  I&O's Detail    2018 07:01  -  2018 07:00  --------------------------------------------------------  IN:    Oral Fluid: 600 mL  Total IN: 600 mL    OUT:    Colostomy: 5 mL    Voided: 850 mL  Total OUT: 855 mL    Total NET: -255 mL      2018 07:01  -  2018 11:25  --------------------------------------------------------  IN:  Total IN: 0 mL    OUT:    Voided: 350 mL  Total OUT: 350 mL    Total NET: -350 mL          General: NAD, resting comfortably in bed  C/V: NSR  Pulm: Nonlabored breathing, no respiratory distress  Abd: soft, mild distension, appropriate tenderness to palpation, midline c/d/i  Extrem: WWP, no edema, SCDs in place        LABS:                        10.5   7.5   )-----------( 470      ( 2018 06:36 )             31.5     -    138  |  102  |  11  ----------------------------<  116<H>  4.0   |  22  |  0.62    Ca    8.8      2018 06:36  Phos  3.3       Mg     2.1             Urinalysis Basic - ( 2018 20:43 )    Color: Yellow / Appearance: Clear / S.015 / pH: x  Gluc: x / Ketone: NEGATIVE  / Bili: Negative / Urobili: 0.2 E.U./dL   Blood: x / Protein: NEGATIVE mg/dL / Nitrite: NEGATIVE   Leuk Esterase: NEGATIVE / RBC: x / WBC x   Sq Epi: x / Non Sq Epi: x / Bacteria: x

## 2018-07-09 NOTE — DISCHARGE NOTE ADULT - HOSPITAL COURSE
Patient is a 72 y/o F w/ hx of [facility], CAD s/p stent, meningioma s/ resection 8/2017, endometriosis      was admitted on 6/29 for a sigmoidoscopy by GI. The sigmoidoscopy was unable to be completed due to a stricture in the sigmoid colon.  On 6/30 she was Taken to the OR and underwent a diverting loop sigmoidostomy. She tolerated the procedure well. Her immediate postoperative course was unremarkable with advancement of diet, passing trial of void, and pain control. On day of discharge patient was stable to be Sub-acute rehab.            7/9: awaiting FRANKY placement   O/N: OOBA, air in ostomy  7/8: T: 100.1 @ 1pm, SARATH, OOBA  ON: NAEO  7/7: duplex negative, fever of 100.9 with full workup (UA negative)  O/N; duplex pending, SARATH  7/6: fluids d/c'd, advanced to LRD, duplex US ordered for RLE TTP  O/N: RVP as per SARATH Murphy  7/5: Advanced to CLD, CXR ordered, Pepsid, labetalol PRN for HTN, D5 1/2 NS @75; continues to have heartburn, ordered EKG (sinus tachy), gave tums senna and colace  O/N: 1 episode of 100cc emesis-given reglan, ostomy with contents  7/4: Advanced to LRD. Fluids d/c. Started protonix for GERD. failed LRD, Abd tensly distended, started CLD, started D5 1/2 NS + 20 KCL @110, Tylenol 650 for pain, emesis 400 mL, made NPO, simethisone/reglan/zofran given, abd xray, T: 100.6 @ 4:30pm, ofirmev for pain  O/N: pt c/o cough, encouraged pt to ambulate, pt seems to have more abd distended today   7/3: Adv to CLD, step down to regional, PT recommends FRANKY on discharge  O/N: pt states she is doing better, encouraged pt to ambulate with assistance, no growth 1 day from bcx   7/2: jimenez removed, passed TOV, NGT removed, PT consulted, ASA restarted   O/N: Still no NGT output, fever workup for 101.3, blood cultures, cxr, ua, pt has been confused and called the , dilaudid dc'd  7/1: NGT flushed,   O/N: Started Keppra 500 IV BID for seizure ppx; will confirm home dose (pt cannot recall).   6/30: Taken to OR diverting loop sigmoidostomy. Deferred resection of strictured colon segment given significant distension. Plan for decompression w/ diverting ostomy now.   6/29: Admitted for GI procedure: unable to advance scope passed 25cm from anal verge, possibly due to diverticulitis causing scarring vs internal herni. Dr Green will take her to OR tonight. LA 4.5 - given 2L IV bolus. repeat LA @3am Ms. Encarnacion is a 74 y/o F w/ hx of [facility], CAD s/p stent, meningioma s/p resection 8/2017, endometriosis, & colonic volvulus s/p exploratory laparotomy, colopexy, & appendectomy, who was recently admitted for descending colitis (concerning for ischemic colitis). She initially responded well to antibiotics and a bowel regimen and was discharged home before re-presenting to [facility] ED with worsening constant crampy diffuse abdominal pain and abdominal distention on 6/29. She was admitted for a sigmoidoscopy by GI. The sigmoidoscopy was unable to be completed due to a stricture in the sigmoid colon.  On 6/30 she was taken to the OR and underwent a diverting loop sigmoidostomy. She tolerated the procedure well. Her immediate postoperative course was unremarkable with advancement of diet, passing trial of void, and pain control. On day of discharge patient was stable to be Sub-acute rehab with instructions to follow-up outpatient in 2 weeks.

## 2018-07-09 NOTE — PROGRESS NOTE ADULT - PROBLEM/PLAN-3
For information on Fall & Injury Prevention, visit [email or web address]
DISPLAY PLAN FREE TEXT

## 2018-07-09 NOTE — PROGRESS NOTE ADULT - PROBLEM SELECTOR PLAN 2
No evidence of cardiac ischemia no CHF

## 2018-07-09 NOTE — PROGRESS NOTE ADULT - PROBLEM SELECTOR PLAN 4
Following hemoglobin and no indication for blood transfusion at this point

## 2018-07-09 NOTE — PROGRESS NOTE ADULT - ASSESSMENT
A/P: POD 9 loop sigmoid colostomy. Much improved  1. Senna, colace  2. Regular diet  3. OOB, IS, PT  4. Rehab when bed available.  5. d/c staples

## 2018-07-09 NOTE — PROGRESS NOTE ADULT - PROBLEM SELECTOR PROBLEM 4
Acute blood loss anemia

## 2018-07-09 NOTE — DISCHARGE NOTE ADULT - CARE PLAN
Principal Discharge DX:	Large bowel obstruction  Goal:	Recovery  Assessment and plan of treatment:	General Discharge Instructions:  Please resume all regular home medications unless specifically advised not to take a particular medication. Also, please take any new medications as prescribed.  Please get plenty of rest, continue to ambulate several times per day, and drink adequate amounts of fluids. Avoid lifting weights greater than 5-10 lbs until you follow-up with your surgeon, who will instruct you further regarding activity restrictions.  Avoid driving or operating heavy machinery while taking pain medications.  Please follow-up with your surgeon and Primary Care Provider (PCP) as advised.  Incision Care:  *Please call your doctor or nurse practitioner if you have increased pain, swelling, redness, or drainage from the incision site.  *Avoid swimming and baths until your follow-up appointment.  *You may shower, and wash surgical incisions with a mild soap and warm water. Gently pat the area dry.  *If you have staples, they will be removed at your follow-up appointment.  *If you have steri-strips, they will fall off on their own. Please remove any remaining strips 7-10 days after surgery.  Goal:	Ostomy  Assessment and plan of treatment:	Please follow instructions provided by the ostomy care nurse. Principal Discharge DX:	Large bowel obstruction  Goal:	Recovery  Assessment and plan of treatment:	General Discharge Instructions:    Please follow-up with Dr. Green in 1-2 weeks. [phone].    Please resume all regular home medications unless specifically advised not to take a particular medication. Also, please take any new medications as prescribed.  Please get plenty of rest, continue to ambulate several times per day, and drink adequate amounts of fluids. Avoid lifting weights greater than 5-10 lbs until you follow-up with your surgeon, who will instruct you further regarding activity restrictions.  Avoid driving or operating heavy machinery while taking pain medications.  Please follow-up with your surgeon and Primary Care Provider (PCP) as advised.  Incision Care:  *Please call your doctor or nurse practitioner if you have increased pain, swelling, redness, or drainage from the incision site.  *Avoid swimming and baths until your follow-up appointment.  *You may shower, and wash surgical incisions with a mild soap and warm water. Gently pat the area dry.  *If you have staples, they will be removed at your follow-up appointment.  *If you have steri-strips, they will fall off on their own. Please remove any remaining strips 7-10 days after surgery.  Goal:	Ostomy  Assessment and plan of treatment:	Please follow instructions provided by the ostomy care nurse.

## 2018-07-09 NOTE — PROGRESS NOTE ADULT - SUBJECTIVE AND OBJECTIVE BOX
Mild right sided pain. Tolerating low residue diet.  AFVSS  Abd soft, minimal distention, incision clean.  Colostomy pink, + air, minimal stool    No leukocytosis

## 2018-07-09 NOTE — PROGRESS NOTE ADULT - PROBLEM SELECTOR PLAN 5
The chest x-rays clear. RVP is negative. My concern is that patient has low grade fever too.  She is improving and temperature curve is lower.  WBC unchanged
The chest x-rays clear. RVP is negative. My concern is that patient has low grade fever too.  She is improving and temperature curve is lower.  WBC unchanged.  No infection
The chest x-rays clear. RVP is negative. My concern is that patient has low grade fever too.  Monitor a my need a CT scan of the abdomen. There's no clinical evidence of pulmonary infection

## 2018-07-09 NOTE — DISCHARGE NOTE ADULT - INSTRUCTIONS
Please follow a low residue diet, minimizing consumption of dairy, whole grains, and raw vegetables.

## 2018-07-09 NOTE — DISCHARGE NOTE ADULT - PLAN OF CARE
Recovery General Discharge Instructions:  Please resume all regular home medications unless specifically advised not to take a particular medication. Also, please take any new medications as prescribed.  Please get plenty of rest, continue to ambulate several times per day, and drink adequate amounts of fluids. Avoid lifting weights greater than 5-10 lbs until you follow-up with your surgeon, who will instruct you further regarding activity restrictions.  Avoid driving or operating heavy machinery while taking pain medications.  Please follow-up with your surgeon and Primary Care Provider (PCP) as advised.  Incision Care:  *Please call your doctor or nurse practitioner if you have increased pain, swelling, redness, or drainage from the incision site.  *Avoid swimming and baths until your follow-up appointment.  *You may shower, and wash surgical incisions with a mild soap and warm water. Gently pat the area dry.  *If you have staples, they will be removed at your follow-up appointment.  *If you have steri-strips, they will fall off on their own. Please remove any remaining strips 7-10 days after surgery. Ostomy Please follow instructions provided by the ostomy care nurse. General Discharge Instructions:    Please follow-up with Dr. Green in 1-2 weeks. [phone].    Please resume all regular home medications unless specifically advised not to take a particular medication. Also, please take any new medications as prescribed.  Please get plenty of rest, continue to ambulate several times per day, and drink adequate amounts of fluids. Avoid lifting weights greater than 5-10 lbs until you follow-up with your surgeon, who will instruct you further regarding activity restrictions.  Avoid driving or operating heavy machinery while taking pain medications.  Please follow-up with your surgeon and Primary Care Provider (PCP) as advised.  Incision Care:  *Please call your doctor or nurse practitioner if you have increased pain, swelling, redness, or drainage from the incision site.  *Avoid swimming and baths until your follow-up appointment.  *You may shower, and wash surgical incisions with a mild soap and warm water. Gently pat the area dry.  *If you have staples, they will be removed at your follow-up appointment.  *If you have steri-strips, they will fall off on their own. Please remove any remaining strips 7-10 days after surgery.

## 2018-07-09 NOTE — PROGRESS NOTE ADULT - SUBJECTIVE AND OBJECTIVE BOX
Interval Events: Reviewed  Patient seen and examined at bedside.    Patient is a 73y old  Female who presents with a chief complaint of Colonic obstruction (09 Jul 2018 10:20)    Is complaining of vocational abdominal pain  PAST MEDICAL & SURGICAL HISTORY:  Lyme disease  CAD (coronary artery disease)  Meningioma  High cholesterol  H/O meningioma of the brain  S/P EVERARDO (total abdominal hysterectomy)      MEDICATIONS:  Pulmonary:    Antimicrobials:    Anticoagulants:    Cardiac:      Allergies    No Known Allergies    Intolerances        Vital Signs Last 24 Hrs  T(C): 37 (09 Jul 2018 09:36), Max: 37.3 (09 Jul 2018 05:48)  T(F): 98.6 (09 Jul 2018 09:36), Max: 99.2 (09 Jul 2018 05:48)  HR: 70 (09 Jul 2018 09:36) (70 - 81)  BP: 154/88 (09 Jul 2018 09:36) (154/88 - 157/94)  BP(mean): --  RR: 18 (09 Jul 2018 09:36) (16 - 18)  SpO2: 96% (09 Jul 2018 09:36) (96% - 96%)    07-08 @ 07:01  -  07-09 @ 07:00  --------------------------------------------------------  IN: 600 mL / OUT: 855 mL / NET: -255 mL    07-09 @ 07:01  -  07-09 @ 22:33  --------------------------------------------------------  IN: 0 mL / OUT: 350 mL / NET: -350 mL          LABS:      CBC Full  -  ( 09 Jul 2018 06:36 )  WBC Count : 7.5 K/uL  Hemoglobin : 10.5 g/dL  Hematocrit : 31.5 %  Platelet Count - Automated : 470 K/uL  Mean Cell Volume : 92.9 fL  Mean Cell Hemoglobin : 31.0 pg  Mean Cell Hemoglobin Concentration : 33.3 g/dL  Auto Neutrophil # : x  Auto Lymphocyte # : x  Auto Monocyte # : x  Auto Eosinophil # : x  Auto Basophil # : x  Auto Neutrophil % : x  Auto Lymphocyte % : x  Auto Monocyte % : x  Auto Eosinophil % : x  Auto Basophil % : x    07-09    138  |  102  |  11  ----------------------------<  116<H>  4.0   |  22  |  0.62    Ca    8.8      09 Jul 2018 06:36  Phos  3.3     07-09  Mg     2.1     07-09                          RADIOLOGY & ADDITIONAL STUDIES (The following images were personally reviewed):  Sparks:                                     No  Urine output:                       adequate  DVT prophylaxis:                 Yes  Flattus:                                  Yes  Bowel movement:              No

## 2018-07-09 NOTE — DISCHARGE NOTE ADULT - PATIENT PORTAL LINK FT
You can access the [facility], offered by [facility], by registering with the following website: [email or web address]

## 2018-07-09 NOTE — PROGRESS NOTE ADULT - PROBLEM SELECTOR PLAN 3
NBO for now.
Patient is on atorvastatin

## 2018-07-09 NOTE — PROGRESS NOTE ADULT - PROBLEM SELECTOR PROBLEM 1
Large bowel obstruction

## 2018-07-09 NOTE — CHART NOTE - NSCHARTNOTEFT_GEN_A_CORE
Admitting Diagnosis:   Patient is a 73y old  Female who presents with a chief complaint of Colonic obstruction (29 Jun 2018 23:06)      PAST MEDICAL & SURGICAL HISTORY:  Lyme disease  CAD (coronary artery disease)  Meningioma  High cholesterol  H/O meningioma of the brain  S/P EVERARDO (total abdominal hysterectomy)      Current Nutrition Order: Low Fiber    PO Intake: Good (%) [   ]  Fair (50-75%) [   ] Poor (<25%) [  x ]    GI Issues: Denies N/V at present.  +stool/ +flatus  Colostomy  Reports stomach is "gurgly" everytime she eats    Pain:  C/o general pain    Skin Integrity:  Surgical incision      Labs:   07-09    138  |  102  |  11  ----------------------------<  116<H>  4.0   |  22  |  0.62    Ca    8.8      09 Jul 2018 06:36  Phos  3.3     07-09  Mg     2.1     07-09      CAPILLARY BLOOD GLUCOSE          Medications:  MEDICATIONS  (STANDING):  aspirin  chewable 81 milliGRAM(s) Oral daily  atorvastatin 40 milliGRAM(s) Oral at bedtime  docusate sodium 100 milliGRAM(s) Oral two times a day  famotidine    Tablet 40 milliGRAM(s) Oral two times a day  heparin  Injectable 5000 Unit(s) SubCutaneous every 8 hours  levETIRAcetam  IVPB 500 milliGRAM(s) IV Intermittent every 12 hours  melatonin 5 milliGRAM(s) Oral at bedtime  pantoprazole  Injectable 40 milliGRAM(s) IV Push daily  polyethylene glycol 3350 17 Gram(s) Oral once  senna 2 Tablet(s) Oral at bedtime    MEDICATIONS  (PRN):  benzocaine 15 mG/menthol 3.6 mG Lozenge 1 Lozenge Oral three times a day PRN Sore Throat  ondansetron Injectable 4 milliGRAM(s) IV Push every 6 hours PRN Nausea and/or Vomiting  oxyCODONE    5 mG/acetaminophen 325 mG 1 Tablet(s) Oral every 4 hours PRN Severe Pain (7 - 10)    Weight: 156.5lb  IBW: 59kg  %IBW: 120%  Daily     Weight Change:   States UBW is 135-145lbs, current documented wt is 156lbs -please obtain new wt for accuracy    Estimated energy needs:   ABW used for calculations as pt between % of IBW.   Nutrient needs based on [facility] standards of care for maintenance in older adults.   Needs adjusted for post-op and age.  1420-1775kcal/day (20-25kcal/kg)  85-99g pro/day  (1.2-1.4g/kg)  2130-2485ml fluid/day (30-35ml/kg)    Subjective:   74yo F s/p diverting loop sigmoid colostomy for obstruction likely d/t diverticular stricture POD9. Currently on a low fiber diet and tolerating PO. Poor appetite, consuming <25% meals. Reports a lack of desire to eat and eating because she has to. States stomach is "gurgly" and is prefering lighter foods. Discussed option of ONS for additional nutrition support, pt initially declined but became agreeable to EnsurePuddings. No more complaints of indigestion. Observed pt consuming yogurt and reports planning to eat her banana. Denies N/V. Reports stool output is watery. RD to follow to assess intake/tolerance.     Previous Nutrition Diagnosis:  Inadequate oral intake RT difficulty meeting needs on CLD order AEB pt consuming <25% estimated needs PO  Active [ x  ]  Resolved [   ]    If resolved, new PES:   Inadequate oral intake RT lack of desire to eat & lack of appetite AEB pt consuming <25% meals on low fiber diet     Goal:  Pt to meet >75% estimated needs PO    Recommendations:  1) Continue on low fiber diet as tolerated  2) Recommend EnsurePudding BID -pt agreeable to trying them  3) Obtain new wt for accuracy  recs discussed w/ team    Education: low fiber foods and lighter refined foods for easier digestion    Risk Level: High [ x  ] Moderate [   ] Low [   ].

## 2018-07-09 NOTE — PROGRESS NOTE ADULT - ATTENDING COMMENTS
Confused overnight.  Better now.  Low grade temps  VSS  u/o adequate  NGT output scant  Abd soft, still distended.  Incision clean  LLQ colostomy viable, no air.    A/P: POD 2 loop sigmoid colostomy for obstruction likely due to diverticular stricture.  1. OOB, IS.  2. PT if needed  3. Monitor NGT output  4. Awaiting more bowel function.  5. Stoma nurse evaluation.
Doing well overnight.  Abd soft, less distended.    NGT output relatively low  u/o ok  Incision clean. colostomy congested.    A/P: POD 1 loop sigmoid colostomy for obstruction in distal sigmoid colon likely due to diverticulosis.  1. Keep NGT today  2. OOB, IS  3. IV to maintenance  4. OK for SDU
Feels much better.  NGT is out  Colostomy pink, some air  AFVSS  Abd much less distended    A/P: POD 3 loop sigmoid colostomy.   1. Regional bed  2. sips of clear liquids  3. OOB, IS. PT
Patient seen and examined with house-staff during bedside rounds.  Resident note read, including vitals, physical findings, laboratory data, and radiological reports.   Revisions included below.  Direct personal management at bed side and extensive interpretation of the data.  Plan was outlined and discussed in details with the housestaff.  Decision making of high complexity  Action taken for acute disease activity to reflect the level of care provided:  - medication reconciliation  - review laboratory data  the events noticed.  The patient was discharged yesterday.  She was referred to ER.  CT scan of abdomen revealed distension of the colon.  GI intervention was unsuccessful.  Surgery findings were discussed with surgery.  patient is stable postop.  DC antibiotics
Patient seen and examined with house-staff during bedside rounds.  Resident note read, including vitals, physical findings, laboratory data, and radiological reports.   Revisions included below.  Direct personal management at bed side and extensive interpretation of the data.  Plan was outlined and discussed in details with the housestaff.  Decision making of high complexity  Action taken for acute disease activity to reflect the level of care provided:  - medication reconciliation  - review laboratory data  \
Patient seen and examined with house-staff during bedside rounds.  Resident note read, including vitals, physical findings, laboratory data, and radiological reports.   Revisions included below.  Direct personal management at bed side and extensive interpretation of the data.  Plan was outlined and discussed in details with the housestaff.  Decision making of high complexity  Action taken for acute disease activity to reflect the level of care provided:  - medication reconciliation  - review laboratory data

## 2018-07-09 NOTE — PROGRESS NOTE ADULT - ASSESSMENT
73F w/ CAD and prior colonic volvulus s/p ex lap/colopexy/appendectomy recently admitted with presumed ischemic colitis of the descending colon, readmitted 6/29 w/ LBO d/t sigmoid stricture past which sigmoidoscope could not be passed thus pt was taken to OR 6/30 for diverting loop sigmoidostomy for decompression.     Neuro: Tylenol. percocet q4 PRN. Keppra for hx seizures.   FEN/GI: LRD. continue to monitor ostomy output  ID: fever workup negative - continue to monitor   Increase ambulation   Incentive spirometry   PPx: SCDs, SQ heparin  Dispo: d/c to [facility] today

## 2018-07-11 ENCOUNTER — INPATIENT (INPATIENT)
Facility: HOSPITAL | Age: 73
LOS: 0 days | Discharge: NURSING, REHAB OR LONG-TERM CARE | DRG: 392 | End: 2018-07-12
Attending: SURGERY | Admitting: SURGERY
Payer: COMMERCIAL

## 2018-07-11 VITALS
RESPIRATION RATE: 18 BRPM | DIASTOLIC BLOOD PRESSURE: 86 MMHG | HEART RATE: 106 BPM | OXYGEN SATURATION: 97 % | TEMPERATURE: 98 F | SYSTOLIC BLOOD PRESSURE: 118 MMHG

## 2018-07-11 DIAGNOSIS — Z90.710 ACQUIRED ABSENCE OF BOTH CERVIX AND UTERUS: Chronic | ICD-10-CM

## 2018-07-11 DIAGNOSIS — K86.9 DISEASE OF PANCREAS, UNSPECIFIED: ICD-10-CM

## 2018-07-11 DIAGNOSIS — E78.5 HYPERLIPIDEMIA, UNSPECIFIED: ICD-10-CM

## 2018-07-11 DIAGNOSIS — K56.691 OTHER COMPLETE INTESTINAL OBSTRUCTION: ICD-10-CM

## 2018-07-11 DIAGNOSIS — Z93.3 COLOSTOMY STATUS: Chronic | ICD-10-CM

## 2018-07-11 DIAGNOSIS — Z95.5 PRESENCE OF CORONARY ANGIOPLASTY IMPLANT AND GRAFT: ICD-10-CM

## 2018-07-11 DIAGNOSIS — K56.7 ILEUS, UNSPECIFIED: ICD-10-CM

## 2018-07-11 DIAGNOSIS — Z79.82 LONG TERM (CURRENT) USE OF ASPIRIN: ICD-10-CM

## 2018-07-11 DIAGNOSIS — E78.1 PURE HYPERGLYCERIDEMIA: ICD-10-CM

## 2018-07-11 DIAGNOSIS — Z93.3 COLOSTOMY STATUS: ICD-10-CM

## 2018-07-11 DIAGNOSIS — I25.10 ATHEROSCLEROTIC HEART DISEASE OF NATIVE CORONARY ARTERY WITHOUT ANGINA PECTORIS: ICD-10-CM

## 2018-07-11 DIAGNOSIS — R50.9 FEVER, UNSPECIFIED: ICD-10-CM

## 2018-07-11 DIAGNOSIS — N39.0 URINARY TRACT INFECTION, SITE NOT SPECIFIED: ICD-10-CM

## 2018-07-11 DIAGNOSIS — Z86.011 PERSONAL HISTORY OF BENIGN NEOPLASM OF THE BRAIN: Chronic | ICD-10-CM

## 2018-07-11 DIAGNOSIS — R10.9 UNSPECIFIED ABDOMINAL PAIN: ICD-10-CM

## 2018-07-11 DIAGNOSIS — K57.32 DIVERTICULITIS OF LARGE INTESTINE WITHOUT PERFORATION OR ABSCESS WITHOUT BLEEDING: ICD-10-CM

## 2018-07-11 DIAGNOSIS — D72.829 ELEVATED WHITE BLOOD CELL COUNT, UNSPECIFIED: ICD-10-CM

## 2018-07-11 DIAGNOSIS — D62 ACUTE POSTHEMORRHAGIC ANEMIA: ICD-10-CM

## 2018-07-11 DIAGNOSIS — R10.32 LEFT LOWER QUADRANT PAIN: ICD-10-CM

## 2018-07-11 DIAGNOSIS — Z86.011 PERSONAL HISTORY OF BENIGN NEOPLASM OF THE BRAIN: ICD-10-CM

## 2018-07-11 DIAGNOSIS — K55.9 VASCULAR DISORDER OF INTESTINE, UNSPECIFIED: ICD-10-CM

## 2018-07-11 LAB
ALBUMIN SERPL ELPH-MCNC: 4 G/DL — SIGNIFICANT CHANGE UP (ref 3.3–5)
ALP SERPL-CCNC: 85 U/L — SIGNIFICANT CHANGE UP (ref 40–120)
ALT FLD-CCNC: 43 U/L — SIGNIFICANT CHANGE UP (ref 10–45)
ANION GAP SERPL CALC-SCNC: 15 MMOL/L — SIGNIFICANT CHANGE UP (ref 5–17)
APPEARANCE UR: ABNORMAL
APTT BLD: 29.6 SEC — SIGNIFICANT CHANGE UP (ref 27.5–37.4)
AST SERPL-CCNC: 36 U/L — SIGNIFICANT CHANGE UP (ref 10–40)
BASOPHILS NFR BLD AUTO: 0.2 % — SIGNIFICANT CHANGE UP (ref 0–2)
BILIRUB SERPL-MCNC: 0.5 MG/DL — SIGNIFICANT CHANGE UP (ref 0.2–1.2)
BILIRUB UR-MCNC: ABNORMAL
BUN SERPL-MCNC: 20 MG/DL — SIGNIFICANT CHANGE UP (ref 7–23)
CALCIUM SERPL-MCNC: 10.1 MG/DL — SIGNIFICANT CHANGE UP (ref 8.4–10.5)
CHLORIDE SERPL-SCNC: 99 MMOL/L — SIGNIFICANT CHANGE UP (ref 96–108)
CO2 SERPL-SCNC: 25 MMOL/L — SIGNIFICANT CHANGE UP (ref 22–31)
COLOR SPEC: YELLOW — SIGNIFICANT CHANGE UP
CREAT SERPL-MCNC: 0.69 MG/DL — SIGNIFICANT CHANGE UP (ref 0.5–1.3)
DIFF PNL FLD: ABNORMAL
EOSINOPHIL NFR BLD AUTO: 0.2 % — SIGNIFICANT CHANGE UP (ref 0–6)
GLUCOSE SERPL-MCNC: 127 MG/DL — HIGH (ref 70–99)
GLUCOSE UR QL: NEGATIVE — SIGNIFICANT CHANGE UP
HCT VFR BLD CALC: 38.6 % — SIGNIFICANT CHANGE UP (ref 34.5–45)
HGB BLD-MCNC: 12.8 G/DL — SIGNIFICANT CHANGE UP (ref 11.5–15.5)
INR BLD: 1.12 — SIGNIFICANT CHANGE UP (ref 0.88–1.16)
KETONES UR-MCNC: NEGATIVE — SIGNIFICANT CHANGE UP
LACTATE SERPL-SCNC: 1.6 MMOL/L — SIGNIFICANT CHANGE UP (ref 0.5–2)
LEUKOCYTE ESTERASE UR-ACNC: ABNORMAL
LIDOCAIN IGE QN: 88 U/L — HIGH (ref 7–60)
LYMPHOCYTES # BLD AUTO: 12.7 % — LOW (ref 13–44)
MCHC RBC-ENTMCNC: 31.2 PG — SIGNIFICANT CHANGE UP (ref 27–34)
MCHC RBC-ENTMCNC: 33.2 G/DL — SIGNIFICANT CHANGE UP (ref 32–36)
MCV RBC AUTO: 94.1 FL — SIGNIFICANT CHANGE UP (ref 80–100)
MONOCYTES NFR BLD AUTO: 5.6 % — SIGNIFICANT CHANGE UP (ref 2–14)
NEUTROPHILS NFR BLD AUTO: 81.3 % — HIGH (ref 43–77)
NITRITE UR-MCNC: NEGATIVE — SIGNIFICANT CHANGE UP
PH UR: 5.5 — SIGNIFICANT CHANGE UP (ref 5–8)
PLATELET # BLD AUTO: 567 K/UL — HIGH (ref 150–400)
POTASSIUM SERPL-MCNC: 4.4 MMOL/L — SIGNIFICANT CHANGE UP (ref 3.5–5.3)
POTASSIUM SERPL-SCNC: 4.4 MMOL/L — SIGNIFICANT CHANGE UP (ref 3.5–5.3)
PROT SERPL-MCNC: 7.4 G/DL — SIGNIFICANT CHANGE UP (ref 6–8.3)
PROT UR-MCNC: ABNORMAL MG/DL
PROTHROM AB SERPL-ACNC: 12.5 SEC — SIGNIFICANT CHANGE UP (ref 9.8–12.7)
RBC # BLD: 4.1 M/UL — SIGNIFICANT CHANGE UP (ref 3.8–5.2)
RBC # FLD: 14.6 % — SIGNIFICANT CHANGE UP (ref 10.3–16.9)
SODIUM SERPL-SCNC: 139 MMOL/L — SIGNIFICANT CHANGE UP (ref 135–145)
SP GR SPEC: >=1.03 — SIGNIFICANT CHANGE UP (ref 1–1.03)
UROBILINOGEN FLD QL: 0.2 E.U./DL — SIGNIFICANT CHANGE UP
WBC # BLD: 12.8 K/UL — HIGH (ref 3.8–10.5)
WBC # FLD AUTO: 12.8 K/UL — HIGH (ref 3.8–10.5)

## 2018-07-11 PROCEDURE — 74022 RADEX COMPL AQT ABD SERIES: CPT | Mod: 26

## 2018-07-11 PROCEDURE — 99285 EMERGENCY DEPT VISIT HI MDM: CPT

## 2018-07-11 RX ORDER — PANTOPRAZOLE SODIUM 20 MG/1
40 TABLET, DELAYED RELEASE ORAL ONCE
Qty: 0 | Refills: 0 | Status: COMPLETED | OUTPATIENT
Start: 2018-07-11 | End: 2018-07-11

## 2018-07-11 RX ORDER — IOHEXOL 300 MG/ML
30 INJECTION, SOLUTION INTRAVENOUS ONCE
Qty: 0 | Refills: 0 | Status: COMPLETED | OUTPATIENT
Start: 2018-07-11 | End: 2018-07-11

## 2018-07-11 RX ORDER — CEFTRIAXONE 500 MG/1
1 INJECTION, POWDER, FOR SOLUTION INTRAMUSCULAR; INTRAVENOUS ONCE
Qty: 0 | Refills: 0 | Status: COMPLETED | OUTPATIENT
Start: 2018-07-11 | End: 2018-07-11

## 2018-07-11 RX ORDER — LEVETIRACETAM 250 MG/1
500 TABLET, FILM COATED ORAL
Qty: 0 | Refills: 0 | Status: DISCONTINUED | OUTPATIENT
Start: 2018-07-11 | End: 2018-07-12

## 2018-07-11 RX ORDER — ACETAMINOPHEN 500 MG
650 TABLET ORAL EVERY 6 HOURS
Qty: 0 | Refills: 0 | Status: DISCONTINUED | OUTPATIENT
Start: 2018-07-11 | End: 2018-07-12

## 2018-07-11 RX ORDER — ATORVASTATIN CALCIUM 80 MG/1
40 TABLET, FILM COATED ORAL AT BEDTIME
Qty: 0 | Refills: 0 | Status: DISCONTINUED | OUTPATIENT
Start: 2018-07-11 | End: 2018-07-12

## 2018-07-11 RX ORDER — ACETAMINOPHEN 500 MG
975 TABLET ORAL EVERY 6 HOURS
Qty: 0 | Refills: 0 | Status: DISCONTINUED | OUTPATIENT
Start: 2018-07-11 | End: 2018-07-12

## 2018-07-11 RX ORDER — SODIUM CHLORIDE 9 MG/ML
1000 INJECTION INTRAMUSCULAR; INTRAVENOUS; SUBCUTANEOUS ONCE
Qty: 0 | Refills: 0 | Status: COMPLETED | OUTPATIENT
Start: 2018-07-11 | End: 2018-07-11

## 2018-07-11 RX ORDER — ONDANSETRON 8 MG/1
4 TABLET, FILM COATED ORAL EVERY 6 HOURS
Qty: 0 | Refills: 0 | Status: DISCONTINUED | OUTPATIENT
Start: 2018-07-11 | End: 2018-07-12

## 2018-07-11 RX ORDER — SIMETHICONE 80 MG/1
80 TABLET, CHEWABLE ORAL DAILY
Qty: 0 | Refills: 0 | Status: DISCONTINUED | OUTPATIENT
Start: 2018-07-11 | End: 2018-07-12

## 2018-07-11 RX ORDER — ASPIRIN/CALCIUM CARB/MAGNESIUM 324 MG
81 TABLET ORAL DAILY
Qty: 0 | Refills: 0 | Status: DISCONTINUED | OUTPATIENT
Start: 2018-07-11 | End: 2018-07-12

## 2018-07-11 RX ORDER — HEPARIN SODIUM 5000 [USP'U]/ML
5000 INJECTION INTRAVENOUS; SUBCUTANEOUS EVERY 8 HOURS
Qty: 0 | Refills: 0 | Status: DISCONTINUED | OUTPATIENT
Start: 2018-07-11 | End: 2018-07-12

## 2018-07-11 RX ORDER — SODIUM CHLORIDE 9 MG/ML
1000 INJECTION, SOLUTION INTRAVENOUS
Qty: 0 | Refills: 0 | Status: DISCONTINUED | OUTPATIENT
Start: 2018-07-11 | End: 2018-07-12

## 2018-07-11 RX ADMIN — SIMETHICONE 80 MILLIGRAM(S): 80 TABLET, CHEWABLE ORAL at 19:21

## 2018-07-11 RX ADMIN — Medication 81 MILLIGRAM(S): at 19:21

## 2018-07-11 RX ADMIN — CEFTRIAXONE 100 GRAM(S): 500 INJECTION, POWDER, FOR SOLUTION INTRAMUSCULAR; INTRAVENOUS at 17:55

## 2018-07-11 RX ADMIN — SODIUM CHLORIDE 110 MILLILITER(S): 9 INJECTION, SOLUTION INTRAVENOUS at 19:21

## 2018-07-11 RX ADMIN — HEPARIN SODIUM 5000 UNIT(S): 5000 INJECTION INTRAVENOUS; SUBCUTANEOUS at 21:19

## 2018-07-11 RX ADMIN — LEVETIRACETAM 500 MILLIGRAM(S): 250 TABLET, FILM COATED ORAL at 19:21

## 2018-07-11 RX ADMIN — IOHEXOL 30 MILLILITER(S): 300 INJECTION, SOLUTION INTRAVENOUS at 15:49

## 2018-07-11 RX ADMIN — SODIUM CHLORIDE 1000 MILLILITER(S): 9 INJECTION INTRAMUSCULAR; INTRAVENOUS; SUBCUTANEOUS at 15:50

## 2018-07-11 RX ADMIN — ATORVASTATIN CALCIUM 40 MILLIGRAM(S): 80 TABLET, FILM COATED ORAL at 21:19

## 2018-07-11 RX ADMIN — PANTOPRAZOLE SODIUM 40 MILLIGRAM(S): 20 TABLET, DELAYED RELEASE ORAL at 21:19

## 2018-07-11 NOTE — ED PROVIDER NOTE - OBJECTIVE STATEMENT
73F w/ hx of HLC, CAD s/p stent, meningioma s/p resection 8/2017, endometriosis, & colonic volvulus s/p exploratory laparotomy, colopexy, & appendectomy, who was recently admitted for descending colitis (concerning for ischemic colitis), diverting loop sigmoidostomy 6/30/18 (Dr. Green) who p/w c/o LLQ pain and leaking colostomy bag 73F w/ hx of C, CAD s/p stent, meningioma s/p resection 8/2017, endometriosis, & colonic volvulus s/p exploratory laparotomy, colopexy, & appendectomy, who was recently admitted for descending colitis (concerning for ischemic colitis), diverting loop sigmoidostomy 6/30/18 (Dr. Green) who p/w c/o LLQ pain and leaking colostomy bag.

## 2018-07-11 NOTE — ED PROVIDER NOTE - PHYSICAL EXAMINATION
GEN: chronically ill appearing, awake, alert, oriented to person, place, time/situation and in no apparent distress. NTAF  ENT: Airway patent, Nasal mucosa clear. Mouth with normal mucosa.  EYES: Clear bilaterally.  RESPIRATORY: Breathing comfortably with normal RR.  CARDIAC: Regular rate and rhythm  ABDOMEN: Soft, +mild TTp in left lower quadrant, +bowel sounds, no rebound, rigidity, or guarding ostomy draingin bown stool.  MSK: Range of motion is not limited, no deformities noted.  NEURO: Alert and oriented, no focal deficits.  SKIN: Skin normal color for race, warm, dry and intact. No evidence of rash.  PSYCH: Alert and oriented to person, place, time/situation. normal mood and affect. no apparent risk to self or others.

## 2018-07-11 NOTE — ED ADULT NURSE NOTE - OBJECTIVE STATEMENT
Pt presents to ED brought in by EMS from nursing facility, reports Dr Murphy told her to come in. Pt reports recent colostomy and lower abdominal pain. Site appears clean and dry. Denies nv, afebrile. Pt presents to ED brought in by EMS from nursing facility, reports Dr Murphy told her to come in. Pt reports recent colostomy and lower abdominal pain. Site appears clean at this time. Denies nv, afebrile.

## 2018-07-11 NOTE — H&P ADULT - NSHPREVIEWOFSYSTEMS_GEN_ALL_CORE
REVIEW OF SYSTEMS:    CONSTITUTIONAL: No weakness, fevers or chills  EYES/ENT: No visual changes;  No vertigo or throat pain   NECK: No pain or stiffness  RESPIRATORY: No cough, wheezing, hemoptysis; No shortness of breath  CARDIOVASCULAR: No chest pain or palpitations  GASTROINTESTINAL: Left sided abdominal tenderness. No nausea, vomiting, or hematemesis; No diarrhea or constipation. No melena or hematochezia.  GENITOURINARY: No dysuria, frequency or hematuria  NEUROLOGICAL: No numbness or weakness  SKIN: No itching, rashes

## 2018-07-11 NOTE — H&P ADULT - NSHPPHYSICALEXAM_GEN_ALL_CORE
General: WN/WD NAD  Neurology: A&Ox3, nonfocal, FALCON x 4  Eyes: EOMI, Gross vision intact  ENT/Neck: Neck supple, trachea midline. Gross hearing intact  Respiratory: Diminished breath sounds in LLL  CV: RRR, S1S2,   Abdominal: Soft, incisional tenderness where staples were removed, mild tenderness in skin surrounding ostomy. Ostomy pink with gas and stool in bag. ND +BS,   Extremities: No edema.  Skin: No Rashes, Hematoma, Ecchymosis

## 2018-07-11 NOTE — H&P ADULT - HISTORY OF PRESENT ILLNESS
73F w/ h/o HLD, CAD s/p stent, meningioma s/p resection 8/2017, endometriosis s/p EVERARDO BSO, & h/o colonic volvulus s/p ex lap, colopexy & appendectomy who was recently admitted for descending colitis (concerning for ischemic colitis) responded well to abx and a bowel regimen and discharged on 6/29. However patient was readmitted with acute distention same day. Abdomen was evaluated as firm and distended.  Not particularly tender  Just had attempted sigmoidoscopy; stricture in sigmoid colon, unable to pass scope.  No stool coming through stricture.  CT demonstrates acute dilation of colon with long stricture in sigmoid colon    A/P:  Suspect stricture of sigmoid colon due to diverticulitis, possibly cancer.    Colotomy made in transverse colon for decompression, mostly gas. Loop sigmoid colostomy created.    d/c on POD 9 to [facility] 73F w/ h/o HLD, CAD s/p stent, meningioma s/p resection 8/2017, endometriosis s/p EVERARDO BSO, & h/o colonic volvulus s/p ex lap, colopexy & appendectomy who was recently admitted for descending colitis (concerning for ischemic colitis) responded well to abx and a bowel regimen and discharged on 6/29. However patient was readmitted with acute distention same day. Abdomen was evaluated as firm and distended; had attempted sigmoidoscopy for decompression however noted stricture in sigmoid colon, unable to pass scope with a CT demonstrates acute dilation of colon with long stricture in sigmoid colon. Patient was taken urgently to OR for Colotomy in the transverse colon for decompression with creation of Loop sigmoid colostomy. Patient was discharged on POD 9 to [facility] on 07/09. Patient now represents with non-specific LLQ pain.     Upon discussion with the patient, patient states that she was receiving "the worst care possible at the [facility]" she states that they took the staples out today and she felt sore afterwards. Patient has no acute complaints. No fevers or chills, having active ostomy function with stool and gas in bag.

## 2018-07-11 NOTE — H&P ADULT - ATTENDING COMMENTS
Seen and examined. Mild incisional tenderness. Colostomy p/p/p  Will admit for social reasons and send to different rehab tomorrow.

## 2018-07-11 NOTE — H&P ADULT - NSHPLABSRESULTS_GEN_ALL_CORE
LABS/RADIOLOGY RESULTS:                          12.8   12.8  )-----------( 567      ( 11 Jul 2018 13:44 )             38.6   07-11    139  |  99  |  20  ----------------------------<  127<H>  4.4   |  25  |  0.69    Ca    10.1      11 Jul 2018 13:44    TPro  7.4  /  Alb  4.0  /  TBili  0.5  /  DBili  x   /  AST  36  /  ALT  43  /  AlkPhos  85  07-11  PT/INR - ( 11 Jul 2018 13:44 )   PT: 12.5 sec;   INR: 1.12          PTT - ( 11 Jul 2018 13:44 )  PTT:29.6 secBlood Cultures    Urinalysis Basic - ( 11 Jul 2018 17:08 )    Color: Yellow / Appearance: Cloudy / SG: >=1.030 / pH:   Gluc:  / Ketone: NEGATIVE  / Bili: Small / Urobili: 0.2 E.U./dL   Blood:  / Protein: Trace mg/dL / Nitrite: NEGATIVE   Leuk Esterase: Small / RBC: 5-10 /HPF / WBC Many /HPF   Sq Epi:  / Non Sq Epi: Many /HPF / Bacteria: Present /HPF

## 2018-07-11 NOTE — ED PROVIDER NOTE - MEDICAL DECISION MAKING DETAILS
Pt with above PMHx and recent admit/surgeries who p/w abd pain. nonsurgical abd exam, ostomy draining normal stool. plan: labs, XRs, UA, surgery consult, consider CT a/p. Dispo pending.

## 2018-07-12 ENCOUNTER — TRANSCRIPTION ENCOUNTER (OUTPATIENT)
Age: 73
End: 2018-07-12

## 2018-07-12 VITALS
DIASTOLIC BLOOD PRESSURE: 87 MMHG | RESPIRATION RATE: 16 BRPM | OXYGEN SATURATION: 95 % | TEMPERATURE: 98 F | SYSTOLIC BLOOD PRESSURE: 142 MMHG | HEART RATE: 90 BPM

## 2018-07-12 LAB
CULTURE RESULTS: SIGNIFICANT CHANGE UP
SPECIMEN SOURCE: SIGNIFICANT CHANGE UP

## 2018-07-12 PROCEDURE — 74022 RADEX COMPL AQT ABD SERIES: CPT

## 2018-07-12 PROCEDURE — 96374 THER/PROPH/DIAG INJ IV PUSH: CPT

## 2018-07-12 PROCEDURE — 85025 COMPLETE CBC W/AUTO DIFF WBC: CPT

## 2018-07-12 PROCEDURE — 83690 ASSAY OF LIPASE: CPT

## 2018-07-12 PROCEDURE — 85730 THROMBOPLASTIN TIME PARTIAL: CPT

## 2018-07-12 PROCEDURE — 81001 URINALYSIS AUTO W/SCOPE: CPT

## 2018-07-12 PROCEDURE — 99285 EMERGENCY DEPT VISIT HI MDM: CPT | Mod: 25

## 2018-07-12 PROCEDURE — 87086 URINE CULTURE/COLONY COUNT: CPT

## 2018-07-12 PROCEDURE — 80053 COMPREHEN METABOLIC PANEL: CPT

## 2018-07-12 PROCEDURE — 83605 ASSAY OF LACTIC ACID: CPT

## 2018-07-12 PROCEDURE — 85610 PROTHROMBIN TIME: CPT

## 2018-07-12 RX ADMIN — SIMETHICONE 80 MILLIGRAM(S): 80 TABLET, CHEWABLE ORAL at 11:26

## 2018-07-12 RX ADMIN — HEPARIN SODIUM 5000 UNIT(S): 5000 INJECTION INTRAVENOUS; SUBCUTANEOUS at 05:28

## 2018-07-12 RX ADMIN — Medication 81 MILLIGRAM(S): at 11:25

## 2018-07-12 RX ADMIN — Medication 30 MILLILITER(S): at 05:28

## 2018-07-12 RX ADMIN — LEVETIRACETAM 500 MILLIGRAM(S): 250 TABLET, FILM COATED ORAL at 05:28

## 2018-07-12 NOTE — DISCHARGE NOTE ADULT - CARE PLAN
Goal:	Recovery  Assessment and plan of treatment:	You are being discharged to [facility]. Please return if:    Warning Signs:  Please call your doctor or nurse practitioner if you experience the following:  *You experience new chest pain, pressure, squeezing or tightness.  *New or worsening cough, shortness of breath, or wheeze.  *If you are vomiting and cannot keep down fluids or your medications.  *You are getting dehydrated due to continued vomiting, diarrhea, or other reasons. Signs of dehydration include dry mouth, rapid heartbeat, or feeling dizzy or faint when standing.  *You see blood or dark/black material when you vomit or have a bowel movement.  *You experience burning when you urinate, have blood in your urine, or experience a discharge.  *Your pain is not improving within 8-12 hours or is not gone within 24 hours. Call or return immediately if your pain is getting worse, changes location, or moves to your chest or back.  *You have shaking chills, or fever greater than 101.5 degrees Fahrenheit or 38 degrees Celsius.  *Any change in your symptoms, or any new symptoms that concern you.

## 2018-07-12 NOTE — DISCHARGE NOTE ADULT - CARE PROVIDER_API CALL
Elke Green), ColonRectal Surgery; Surgery  [address] [place] [address]  [address] [place] [address]  Phone: [phone]  Fax: [phone]

## 2018-07-12 NOTE — DISCHARGE NOTE ADULT - HOSPITAL COURSE
Patient was discharged from [facility] on 6/29 s/p hospitalization for diverting loop sigmoid colostomy. She presented again on 7/11 with non-specific LLQ pain without other sxs. Workup was negative, and nothing has changed since previous discharge summary. She will be dc'd to [facility] [place].

## 2018-07-12 NOTE — DISCHARGE NOTE ADULT - MEDICATION SUMMARY - MEDICATIONS TO TAKE
I will START or STAY ON the medications listed below when I get home from the hospital:    aspirin 81 mg oral tablet  -- 1 tab(s) by mouth once a day  -- Indication: For home med    aluminum hydroxide-magnesium hydroxide 200 mg-200 mg/5 mL oral suspension  -- 30 milliliter(s) by mouth every 4 hours, As needed, Dyspepsia  -- Indication: For home med    levETIRAcetam 500 mg oral tablet  -- 1 tab(s) by mouth 2 times a day  -- Indication: For home med    atorvastatin  -- 40 milligram(s) by mouth once (at bedtime)  -- Indication: For home med    simethicone 40 mg oral disintegrating strip  -- 40 milligram(s) by mouth 3 times a day (after meals), As Needed   -- Indication: For home med

## 2018-07-12 NOTE — DISCHARGE NOTE ADULT - PATIENT PORTAL LINK FT
You can access the [facility], offered by [facility], by registering with the following website: [email or web address]

## 2018-07-12 NOTE — DISCHARGE NOTE ADULT - PLAN OF CARE
Recovery You are being discharged to [facility]. Please return if:    Warning Signs:  Please call your doctor or nurse practitioner if you experience the following:  *You experience new chest pain, pressure, squeezing or tightness.  *New or worsening cough, shortness of breath, or wheeze.  *If you are vomiting and cannot keep down fluids or your medications.  *You are getting dehydrated due to continued vomiting, diarrhea, or other reasons. Signs of dehydration include dry mouth, rapid heartbeat, or feeling dizzy or faint when standing.  *You see blood or dark/black material when you vomit or have a bowel movement.  *You experience burning when you urinate, have blood in your urine, or experience a discharge.  *Your pain is not improving within 8-12 hours or is not gone within 24 hours. Call or return immediately if your pain is getting worse, changes location, or moves to your chest or back.  *You have shaking chills, or fever greater than 101.5 degrees Fahrenheit or 38 degrees Celsius.  *Any change in your symptoms, or any new symptoms that concern you.

## 2018-07-13 LAB
CULTURE RESULTS: SIGNIFICANT CHANGE UP
SPECIMEN SOURCE: SIGNIFICANT CHANGE UP

## 2018-07-16 ENCOUNTER — INPATIENT (INPATIENT)
Facility: HOSPITAL | Age: 73
LOS: 1 days | Discharge: NURSING, REHAB OR LONG-TERM CARE | DRG: 391 | End: 2018-07-18
Attending: SURGERY | Admitting: SURGERY
Payer: MEDICARE

## 2018-07-16 VITALS
DIASTOLIC BLOOD PRESSURE: 92 MMHG | HEART RATE: 98 BPM | RESPIRATION RATE: 18 BRPM | TEMPERATURE: 99 F | OXYGEN SATURATION: 96 % | SYSTOLIC BLOOD PRESSURE: 158 MMHG

## 2018-07-16 DIAGNOSIS — Z90.710 ACQUIRED ABSENCE OF BOTH CERVIX AND UTERUS: Chronic | ICD-10-CM

## 2018-07-16 DIAGNOSIS — Z93.3 COLOSTOMY STATUS: Chronic | ICD-10-CM

## 2018-07-16 DIAGNOSIS — Z86.011 PERSONAL HISTORY OF BENIGN NEOPLASM OF THE BRAIN: Chronic | ICD-10-CM

## 2018-07-16 LAB
APPEARANCE UR: CLEAR — SIGNIFICANT CHANGE UP
BILIRUB UR-MCNC: NEGATIVE — SIGNIFICANT CHANGE UP
COLOR SPEC: YELLOW — SIGNIFICANT CHANGE UP
DIFF PNL FLD: NEGATIVE — SIGNIFICANT CHANGE UP
GLUCOSE UR QL: NEGATIVE — SIGNIFICANT CHANGE UP
KETONES UR-MCNC: 15 MG/DL
LEUKOCYTE ESTERASE UR-ACNC: ABNORMAL
NITRITE UR-MCNC: NEGATIVE — SIGNIFICANT CHANGE UP
PH UR: 6 — SIGNIFICANT CHANGE UP (ref 5–8)
PROT UR-MCNC: ABNORMAL MG/DL
SP GR SPEC: 1.02 — SIGNIFICANT CHANGE UP (ref 1–1.03)
UROBILINOGEN FLD QL: 1 E.U./DL — SIGNIFICANT CHANGE UP

## 2018-07-16 PROCEDURE — 99285 EMERGENCY DEPT VISIT HI MDM: CPT

## 2018-07-16 PROCEDURE — 74019 RADEX ABDOMEN 2 VIEWS: CPT | Mod: 26

## 2018-07-16 RX ORDER — SODIUM CHLORIDE 9 MG/ML
1000 INJECTION INTRAMUSCULAR; INTRAVENOUS; SUBCUTANEOUS ONCE
Qty: 0 | Refills: 0 | Status: COMPLETED | OUTPATIENT
Start: 2018-07-16 | End: 2018-07-16

## 2018-07-16 RX ORDER — ACETAMINOPHEN 500 MG
975 TABLET ORAL EVERY 6 HOURS
Qty: 0 | Refills: 0 | Status: DISCONTINUED | OUTPATIENT
Start: 2018-07-16 | End: 2018-07-18

## 2018-07-16 RX ORDER — ACETAMINOPHEN 500 MG
650 TABLET ORAL EVERY 6 HOURS
Qty: 0 | Refills: 0 | Status: DISCONTINUED | OUTPATIENT
Start: 2018-07-16 | End: 2018-07-18

## 2018-07-16 RX ORDER — ONDANSETRON 8 MG/1
4 TABLET, FILM COATED ORAL EVERY 6 HOURS
Qty: 0 | Refills: 0 | Status: DISCONTINUED | OUTPATIENT
Start: 2018-07-16 | End: 2018-07-18

## 2018-07-16 RX ORDER — ATORVASTATIN CALCIUM 80 MG/1
40 TABLET, FILM COATED ORAL AT BEDTIME
Qty: 0 | Refills: 0 | Status: DISCONTINUED | OUTPATIENT
Start: 2018-07-16 | End: 2018-07-18

## 2018-07-16 RX ORDER — HEPARIN SODIUM 5000 [USP'U]/ML
5000 INJECTION INTRAVENOUS; SUBCUTANEOUS EVERY 8 HOURS
Qty: 0 | Refills: 0 | Status: DISCONTINUED | OUTPATIENT
Start: 2018-07-16 | End: 2018-07-18

## 2018-07-16 RX ORDER — PANTOPRAZOLE SODIUM 20 MG/1
40 TABLET, DELAYED RELEASE ORAL ONCE
Qty: 0 | Refills: 0 | Status: COMPLETED | OUTPATIENT
Start: 2018-07-16 | End: 2018-07-16

## 2018-07-16 RX ORDER — IBUPROFEN 200 MG
400 TABLET ORAL EVERY 6 HOURS
Qty: 0 | Refills: 0 | Status: DISCONTINUED | OUTPATIENT
Start: 2018-07-16 | End: 2018-07-18

## 2018-07-16 RX ORDER — LEVETIRACETAM 250 MG/1
500 TABLET, FILM COATED ORAL
Qty: 0 | Refills: 0 | Status: DISCONTINUED | OUTPATIENT
Start: 2018-07-16 | End: 2018-07-18

## 2018-07-16 RX ORDER — ASPIRIN/CALCIUM CARB/MAGNESIUM 324 MG
81 TABLET ORAL DAILY
Qty: 0 | Refills: 0 | Status: DISCONTINUED | OUTPATIENT
Start: 2018-07-16 | End: 2018-07-18

## 2018-07-16 RX ORDER — MORPHINE SULFATE 50 MG/1
2 CAPSULE, EXTENDED RELEASE ORAL ONCE
Qty: 0 | Refills: 0 | Status: DISCONTINUED | OUTPATIENT
Start: 2018-07-16 | End: 2018-07-16

## 2018-07-16 RX ADMIN — ATORVASTATIN CALCIUM 40 MILLIGRAM(S): 80 TABLET, FILM COATED ORAL at 21:56

## 2018-07-16 RX ADMIN — MORPHINE SULFATE 2 MILLIGRAM(S): 50 CAPSULE, EXTENDED RELEASE ORAL at 12:20

## 2018-07-16 RX ADMIN — Medication 975 MILLIGRAM(S): at 18:50

## 2018-07-16 RX ADMIN — SODIUM CHLORIDE 1000 MILLILITER(S): 9 INJECTION INTRAMUSCULAR; INTRAVENOUS; SUBCUTANEOUS at 12:17

## 2018-07-16 RX ADMIN — Medication 81 MILLIGRAM(S): at 17:53

## 2018-07-16 RX ADMIN — LEVETIRACETAM 500 MILLIGRAM(S): 250 TABLET, FILM COATED ORAL at 17:52

## 2018-07-16 RX ADMIN — PANTOPRAZOLE SODIUM 40 MILLIGRAM(S): 20 TABLET, DELAYED RELEASE ORAL at 12:18

## 2018-07-16 RX ADMIN — Medication 975 MILLIGRAM(S): at 17:50

## 2018-07-16 RX ADMIN — HEPARIN SODIUM 5000 UNIT(S): 5000 INJECTION INTRAVENOUS; SUBCUTANEOUS at 21:55

## 2018-07-16 NOTE — H&P ADULT - ATTENDING COMMENTS
Admitted ostensibly for abdominal pain and hematemesis/hemoptysis, but she does not seem to have any of these other than mild abdominal pain.  Looks well this morning  Colostomy p/p/p. Abd soft, ND, minimal incisional tenderness.    She has rejected two rehab facilities in 1 week.  Will see about home with services.

## 2018-07-16 NOTE — ED ADULT NURSE REASSESSMENT NOTE - NS ED NURSE REASSESS COMMENT FT1
VSS, pt c/o L sided chest pain. Denies SOB, n/v, headache, dizziness. No distress. Surgical team paged twice with no answer. Care transferred to Dee GLASS in [place], aware of pt's pain.

## 2018-07-16 NOTE — H&P ADULT - ASSESSMENT
74 yo F with extensive past medical and surgical history presenting with non-specific LLQ pain. Patient was recently discharged on 07/09 after loop colostomy creation. Represented last week due to social issues. Represents for new social issues    [facility] for [facility] Green  Low Residue Diet  NO IV Fluids  Home Medications  DVT PPX  IS/OOB  Will Also require urgent Case Management & Social Work. Sister is coming from [place] we will discharge to home, with all necessary services.

## 2018-07-16 NOTE — H&P ADULT - NSHPLABSRESULTS_GEN_ALL_CORE
LABS/RADIOLOGY RESULTS:                          10.4   11.4  )-----------( 559      ( 2018 12:25 )             30.8   07-16    139  |  100  |  11  ----------------------------<  124<H>  4.0   |  26  |  0.49<L>    Ca    9.1      2018 12:25    TPro  6.8  /  Alb  3.1<L>  /  TBili  0.7  /  DBili  x   /  AST  26  /  ALT  24  /  AlkPhos  95  07-16  PT/INR - ( 2018 12:25 )   PT: 13.2 sec;   INR: 1.19          PTT - ( 2018 12:25 )  PTT:21.6 secBlood Cultures    Urinalysis Basic - ( 2018 14:22 )    Color: Yellow / Appearance: Clear / S.020 / pH:   Gluc:  / Ketone: 15 mg/dL  / Bili: Negative / Urobili: 1.0 E.U./dL   Blood:  / Protein: Trace mg/dL / Nitrite: NEGATIVE   Leuk Esterase: Trace / RBC: < 5 /HPF / WBC 5-10 /HPF   Sq Epi:  / Non Sq Epi: 5-10 /HPF / Bacteria: Present /HPF

## 2018-07-16 NOTE — ED ADULT TRIAGE NOTE - CHIEF COMPLAINT QUOTE
pt received to er with c/o vomiting blood x 1 episode last night. Pt had a colon resection recently.

## 2018-07-16 NOTE — ED ADULT NURSE NOTE - OBJECTIVE STATEMENT
Pt c/o an episode of bloody emesis last night. Pt is currently in a nursing home after discharge for a colon resection here at [facility]. Pt denies any other symptoms.

## 2018-07-16 NOTE — ED PROVIDER NOTE - OBJECTIVE STATEMENT
74 y/o female with a PMHx HTN, HLD, meningioma, volvulus, recent colostomy is present with vomiting blood once yesterday. Pt reports she was spitting and noticed blood. Pt also reports diffused abdominal discomfort around the colostomy. Pt reports she has been asking for percocet at the nursing home, but was only given tylenol. Pt also upset with the treatment and care at the facility and wants to stay in the hospital. Pt denies the following: fever, chills, N/V, urinary symptoms, painful/difficulty swallowing, chest pain, sob.

## 2018-07-16 NOTE — H&P ADULT - HISTORY OF PRESENT ILLNESS
73F w/ h/o HLD, CAD s/p stent, meningioma s/p resection 8/2017, endometriosis s/p EVERARDO BSO, & h/o colonic volvulus s/p ex lap, colopexy & appendectomy who was recently admitted for descending colitis (concerning for ischemic colitis) responded well to abx and a bowel regimen and discharged on 6/29. However patient was readmitted with acute distention same day. Abdomen was evaluated as firm and distended; had attempted sigmoidoscopy for decompression however noted stricture in sigmoid colon, unable to pass scope with a CT demonstrates acute dilation of colon with long stricture in sigmoid colon. Patient was taken urgently to OR for Colotomy in the transverse colon for decompression with creation of Loop sigmoid colostomy. Patient was discharged on POD 9 to [facility] on 07/09. Patient now represents with non-specific pain and dissatisfaction with [facility].   Patient was recently here after coming from [facility] where patient stated that she was receiving "the worst care possible at the [facility]"      Patient has no acute complaints. No fevers or chills, having active ostomy function with stool and gas in bag. She returns today for once again dissatisfaction with [facility] in [facility]. Reports 1 episode of emesis with blood, "a little that fit in a cup"

## 2018-07-16 NOTE — ED PROVIDER NOTE - MEDICAL DECISION MAKING DETAILS
74 y/o female presenting in NAD c/o 1 episode of blood vomit. Further upon assessment, pt states she is here, because she does not like the rehab facility where she is located. Abdomen exam with no ttp and bag with nml drainage.  involved in care. VS nml. Labs with no significant changes. Fluids given for rehydration. Surgery consulted - will admit for further resources and hydration.

## 2018-07-16 NOTE — ED PROVIDER NOTE - ATTENDING CONTRIBUTION TO CARE
74 yo female h/o HTN, HLD, meningioma, volvulus, s/p recent colostomy c/o hematemesis w lg clot x 1 yest and also c/o cont abd pain similar to pain she's had since her surgery w/o receiving adequate reponse and pain meds from her rehab facility  No further n/v, h/o gi bleed, other bleeding/bruising, associated cp, palpitations, sob.   No black stool in colostomy bag.  No fever.  Chronically ill appearing, nad, nc/at, lung cta, heart reg, abd soft/nt, colostomy w air and brown stool in bag, incisions c/d/i, no gross neuro deficits.  VSS, hgb stable from prev.  No sig concern for active gi bleed at this time.  Surg consulted and will admit.

## 2018-07-16 NOTE — H&P ADULT - NSHPREVIEWOFSYSTEMS_GEN_ALL_CORE
CONSTITUTIONAL: No weakness, fevers or chills  EYES/ENT: No visual changes;  No vertigo or throat pain   NECK: No pain or stiffness  RESPIRATORY: No cough, wheezing, hemoptysis; No shortness of breath  CARDIOVASCULAR: No chest pain or palpitations  GASTROINTESTINAL: Left sided abdominal tenderness. 1 x episode emesis with little blood; No diarrhea or constipation. No melena or hematochezia.  GENITOURINARY: No dysuria, frequency or hematuria  NEUROLOGICAL: No numbness or weakness  SKIN: No itching, rashes

## 2018-07-16 NOTE — H&P ADULT - NSHPPHYSICALEXAM_GEN_ALL_CORE
General: NAD  Neurology: A&Ox3, nonfocal, FALCON x 4  Eyes: EOMI, Gross vision intact  ENT/Neck: Neck supple, trachea midline, No JVD, Gross hearing intact  Respiratory: No wheezing, rales, rhonchi  CV: RRR, S1S2,   Abdominal: Soft, incisional tenderness where staples were removed, mild tenderness in skin surrounding ostomy. Ostomy pink with gas and stool in bag. ND +BS,   Extremities: No edema.  Skin: No Rashes, Hematoma, Ecchymosis

## 2018-07-17 LAB
CULTURE RESULTS: SIGNIFICANT CHANGE UP
SPECIMEN SOURCE: SIGNIFICANT CHANGE UP

## 2018-07-17 RX ADMIN — LEVETIRACETAM 500 MILLIGRAM(S): 250 TABLET, FILM COATED ORAL at 17:38

## 2018-07-17 RX ADMIN — Medication 81 MILLIGRAM(S): at 13:07

## 2018-07-17 RX ADMIN — LEVETIRACETAM 500 MILLIGRAM(S): 250 TABLET, FILM COATED ORAL at 05:43

## 2018-07-17 RX ADMIN — HEPARIN SODIUM 5000 UNIT(S): 5000 INJECTION INTRAVENOUS; SUBCUTANEOUS at 13:07

## 2018-07-17 RX ADMIN — Medication 975 MILLIGRAM(S): at 00:58

## 2018-07-17 RX ADMIN — HEPARIN SODIUM 5000 UNIT(S): 5000 INJECTION INTRAVENOUS; SUBCUTANEOUS at 21:56

## 2018-07-17 RX ADMIN — Medication 975 MILLIGRAM(S): at 01:35

## 2018-07-17 RX ADMIN — HEPARIN SODIUM 5000 UNIT(S): 5000 INJECTION INTRAVENOUS; SUBCUTANEOUS at 05:43

## 2018-07-17 RX ADMIN — ATORVASTATIN CALCIUM 40 MILLIGRAM(S): 80 TABLET, FILM COATED ORAL at 21:56

## 2018-07-17 NOTE — DIETITIAN INITIAL EVALUATION ADULT. - OTHER INFO
72yo F w/ extensive PMH and PSH p/w non-specific LLQ pain. Pt  was recently discharged on 07/09 after loop colostomy creation. Represented last week d/t social issues. Represents now for discontent with current nursing home. Pt seen asleep in bed. Mildly aroused by verbal stimuli and minimally participative in assessment. Currently on a low fiber diet and tolerating PO. Per dietary recall, suspect pt is consuming <50% estimated needs. Had yogurt and bread for breakfast. Asked pt which proteins she incorporates into meals, to which she stated she'll have a small amount of fish, chicken and eggs. Discussed increased needs 2/2 surgical history, suspected muscle wasting (although unable to perform a nutrition focused physical assessment at this time) and Stage 2 pressure ulcer. Pt did not express receptiveness or desire to incorporate more foods into diet. No wt / ht documented -anthropometrics obtained from previous admission.  Wt was documented to be 156lbs (6/29). Reported UBW is 140lbs. Suspect inaccuracy in wts documented- please reweigh for trending. Denies h/o wt loss/wt change. Had small output in colostomy.  NKFA or dietary restrictions. Skin: fadi score 15, stage 2 pressure ulcer; GI: distended, colostomy per flowsheet.

## 2018-07-17 NOTE — ADVANCED PRACTICE NURSE CONSULT - ASSESSMENT
72 yo F with extensive past medical and surgical history presenting with non-specific LLQ pain. Patient was recently discharged on 07/09 after loop colostomy creation. Represented last week due to social issues. Represents for new social issues. Pt admitted with fungal rash to bilat buttocks and perirectal area. Ostomy appliance intact although skin barrier was cut too big. New skin barrier cut and applied to minimize contact of stool on skin.

## 2018-07-17 NOTE — PHYSICAL THERAPY INITIAL EVALUATION ADULT - ADDITIONAL COMMENTS
Pt lives at home alone with elevator, with few JULIET. PTA: with indep no AD though pt was at rehab where she required 1 person assist to amb.

## 2018-07-17 NOTE — PATIENT PROFILE ADULT. - NSNUTRITIONRISK_GI_A_CORE
Pressure ulcer stage 2 or greater/Significant decrease of oral intake greater than 5 days prior to admission

## 2018-07-17 NOTE — PROGRESS NOTE ADULT - ASSESSMENT
72 yo F with extensive past medical and surgical history presenting with non-specific LLQ pain. Patient was recently discharged on 07/09 after loop colostomy creation. Represented last week due to social issues. Represents for discontent with current nursing home.       Social work planning  Tylenol for pain  D/C today with social work plans for VNS/new nursing home

## 2018-07-17 NOTE — PHYSICAL THERAPY INITIAL EVALUATION ADULT - GENERAL OBSERVATIONS, REHAB EVAL
Rec'd pt supine in bed, non-acute distress, +ostomy, +heplock, cleared for PT and OOB activity by Rn (sammie). pt c/o 6/10 generalized pain Rn sammie aware though pt agreeable for PT and OOB acitvity.

## 2018-07-17 NOTE — ADVANCED PRACTICE NURSE CONSULT - RECOMMEDATIONS
Recommend antifungal moisture barrier ointment to bilat buttocks bid and prn, pt given extra for discharge. Spoke with MARIA LUISA Dasilva and house staff.

## 2018-07-17 NOTE — DIETITIAN INITIAL EVALUATION ADULT. - ENERGY NEEDS
Height: 5'6"(obtained from prior admission), Weight: 156lbs, IBW 130lbs+/-10%, %%, BMI 25   ABW used for calculations as pt between % of IBW.   Nutrient needs based on [facility] standards of care for maintenance in older adults.   Needs adjusted for age and stage 2 pressure ulcer

## 2018-07-18 ENCOUNTER — TRANSCRIPTION ENCOUNTER (OUTPATIENT)
Age: 73
End: 2018-07-18

## 2018-07-18 VITALS
TEMPERATURE: 99 F | HEART RATE: 94 BPM | DIASTOLIC BLOOD PRESSURE: 63 MMHG | OXYGEN SATURATION: 95 % | SYSTOLIC BLOOD PRESSURE: 134 MMHG | RESPIRATION RATE: 17 BRPM

## 2018-07-18 DIAGNOSIS — J15.9 UNSPECIFIED BACTERIAL PNEUMONIA: ICD-10-CM

## 2018-07-18 DIAGNOSIS — J98.11 ATELECTASIS: ICD-10-CM

## 2018-07-18 DIAGNOSIS — J90 PLEURAL EFFUSION, NOT ELSEWHERE CLASSIFIED: ICD-10-CM

## 2018-07-18 PROCEDURE — 71275 CT ANGIOGRAPHY CHEST: CPT | Mod: 26

## 2018-07-18 PROCEDURE — 80053 COMPREHEN METABOLIC PANEL: CPT

## 2018-07-18 PROCEDURE — 74019 RADEX ABDOMEN 2 VIEWS: CPT

## 2018-07-18 PROCEDURE — 85730 THROMBOPLASTIN TIME PARTIAL: CPT

## 2018-07-18 PROCEDURE — 71275 CT ANGIOGRAPHY CHEST: CPT

## 2018-07-18 PROCEDURE — 36415 COLL VENOUS BLD VENIPUNCTURE: CPT

## 2018-07-18 PROCEDURE — 97161 PT EVAL LOW COMPLEX 20 MIN: CPT

## 2018-07-18 PROCEDURE — 99285 EMERGENCY DEPT VISIT HI MDM: CPT | Mod: 25

## 2018-07-18 PROCEDURE — 96375 TX/PRO/DX INJ NEW DRUG ADDON: CPT

## 2018-07-18 PROCEDURE — 85025 COMPLETE CBC W/AUTO DIFF WBC: CPT

## 2018-07-18 PROCEDURE — 81001 URINALYSIS AUTO W/SCOPE: CPT

## 2018-07-18 PROCEDURE — 87086 URINE CULTURE/COLONY COUNT: CPT

## 2018-07-18 PROCEDURE — 96374 THER/PROPH/DIAG INJ IV PUSH: CPT

## 2018-07-18 PROCEDURE — 85610 PROTHROMBIN TIME: CPT

## 2018-07-18 RX ADMIN — HEPARIN SODIUM 5000 UNIT(S): 5000 INJECTION INTRAVENOUS; SUBCUTANEOUS at 13:23

## 2018-07-18 RX ADMIN — Medication 81 MILLIGRAM(S): at 13:23

## 2018-07-18 RX ADMIN — HEPARIN SODIUM 5000 UNIT(S): 5000 INJECTION INTRAVENOUS; SUBCUTANEOUS at 05:45

## 2018-07-18 RX ADMIN — LEVETIRACETAM 500 MILLIGRAM(S): 250 TABLET, FILM COATED ORAL at 05:45

## 2018-07-18 NOTE — PROGRESS NOTE ADULT - ATTENDING COMMENTS
Patient seen and examined with house-staff during bedside rounds.  Resident note read, including vitals, physical findings, laboratory data, and radiological reports.   Revisions included below.  Direct personal management at bed side and extensive interpretation of the data.  Plan was outlined and discussed in details with the housestaff.  Decision making of high complexity  Action taken for acute disease activity to reflect the level of care provided:  - medication reconciliation  - review laboratory data  Increase activity  Pain controlled  CXR  no hemoptysis  tolerating oral diet  encourage to incraese activity  discussed with the family
Patient seen and examined with house-staff during bedside rounds.  Resident note read, including vitals, physical findings, laboratory data, and radiological reports.   Revisions included below.  Direct personal management at bed side and extensive interpretation of the data.  Plan was outlined and discussed in details with the housestaff.  Decision making of high complexity  Action taken for acute disease activity to reflect the level of care provided:  - medication reconciliation  - review laboratory data  Increase activity  Pain controlled  CXR  no hemoptysis  tolerating oral diet  encourage to incraese activity  discussed with the family twice

## 2018-07-18 NOTE — DISCHARGE NOTE ADULT - HOSPITAL COURSE
73F w/ h/o HLD, CAD s/p stent, meningioma s/p resection 8/2017, endometriosis s/p EVERARDO BSO, & h/o colonic volvulus s/p ex lap, colopexy & appendectomy who was recently admitted for descending colitis (concerning for ischemic colitis) responded well to abx and a bowel regimen and discharged on 6/29. However patient was readmitted with acute distention same day. Abdomen was evaluated as firm and distended; had attempted sigmoidoscopy for decompression however noted stricture in sigmoid colon, unable to pass scope with a CT demonstrates acute dilation of colon with long stricture in sigmoid colon. Patient was taken urgently to OR for Colotomy in the transverse colon for decompression with creation of Loop sigmoid colostomy. Patient was discharged on POD 9 to [facility] on 07/09. Patient now represents with non-specific pain and dissatisfaction with [facility]. Patient was recently here after coming from [facility] where patient stated that she was receiving "the worst care possible at the [facility]" Patient has no acute complaints. No fevers or chills, having active ostomy function with stool and gas in bag. She returns today for once again dissatisfaction with [facility] in [facility]. Reports 1 episode of emesis with blood, "a little that fit in a cup"  Diet was advanced as tolerated and pain was well controlled on medication. On day of discharge, pt deemed stable and ready to return to [facility]

## 2018-07-18 NOTE — DISCHARGE NOTE ADULT - CARE PROVIDER_API CALL
Elke Green), ColonRectal Surgery; Surgery  [address] [place] [address]  [address] [place] [address]  Phone: [phone]  Fax: [phone]

## 2018-07-18 NOTE — PROGRESS NOTE ADULT - ASSESSMENT
72 yo F with extensive past medical and surgical history presenting with non-specific LLQ pain. Patient was recently discharged on 07/09 after loop colostomy creation. Represented last week due to social issues. Represents for discontent with current nursing home.       D/C today to [facility]

## 2018-07-18 NOTE — DISCHARGE NOTE ADULT - CARE PLAN
Principal Discharge DX:	Abdominal pain  Goal:	recovery  Assessment and plan of treatment:	General Discharge Instructions:  Please resume all regular home medications unless specifically advised not to take a particular medication. Also, please take any new medications as prescribed.  Please get plenty of rest, continue to ambulate several times per day, and drink adequate amounts of fluids. Avoid lifting weights greater than 5-10 lbs until you follow-up with your surgeon, who will instruct you further regarding activity restrictions.  Avoid driving or operating heavy machinery while taking pain medications.  Please follow-up with your surgeon and Primary Care Provider (PCP) as advised.  Incision Care:  *Please call your doctor or nurse practitioner if you have increased pain, swelling, redness, or drainage from the incision site.  *Avoid swimming and baths until your follow-up appointment.  *You may shower, and wash surgical incisions with a mild soap and warm water. Gently pat the area dry.  *If you have staples, they will be removed at your follow-up appointment.  *If you have steri-strips, they will fall off on their own. Please remove any remaining strips 7-10 days after surgery.  Goal:	recovery  Assessment and plan of treatment:	Warning Signs:  Please call your doctor or nurse practitioner if you experience the following:  *You experience new chest pain, pressure, squeezing or tightness.  *New or worsening cough, shortness of breath, or wheeze.  *If you are vomiting and cannot keep down fluids or your medications.  *You are getting dehydrated due to continued vomiting, diarrhea, or other reasons. Signs of dehydration include dry mouth, rapid heartbeat, or feeling dizzy or faint when standing.  *You see blood or dark/black material when you vomit or have a bowel movement.  *You experience burning when you urinate, have blood in your urine, or experience a discharge.  *Your pain is not improving within 8-12 hours or is not gone within 24 hours. Call or return immediately if your pain is getting worse, changes location, or moves to your chest or back.  *You have shaking chills, or fever greater than 101.5 degrees Fahrenheit or 38 degrees Celsius.  *Any change in your symptoms, or any new symptoms that concern you. Principal Discharge DX:	Abdominal pain  Goal:	recovery  Assessment and plan of treatment:	You have been prescribed oral antibiotics. Please be sure to complete the entire course as directed.    General Discharge Instructions:  Please resume all regular home medications unless specifically advised not to take a particular medication. Also, please take any new medications as prescribed.  Please get plenty of rest, continue to ambulate several times per day, and drink adequate amounts of fluids. Avoid lifting weights greater than 5-10 lbs until you follow-up with your surgeon, who will instruct you further regarding activity restrictions.  Avoid driving or operating heavy machinery while taking pain medications.  Please follow-up with your surgeon and Primary Care Provider (PCP) as advised.  Incision Care:  *Please call your doctor or nurse practitioner if you have increased pain, swelling, redness, or drainage from the incision site.  *Avoid swimming and baths until your follow-up appointment.  *You may shower, and wash surgical incisions with a mild soap and warm water. Gently pat the area dry.  *If you have staples, they will be removed at your follow-up appointment.  *If you have steri-strips, they will fall off on their own. Please remove any remaining strips 7-10 days after surgery.  Goal:	recovery  Assessment and plan of treatment:	Warning Signs:  Please call your doctor or nurse practitioner if you experience the following:  *You experience new chest pain, pressure, squeezing or tightness.  *New or worsening cough, shortness of breath, or wheeze.  *If you are vomiting and cannot keep down fluids or your medications.  *You are getting dehydrated due to continued vomiting, diarrhea, or other reasons. Signs of dehydration include dry mouth, rapid heartbeat, or feeling dizzy or faint when standing.  *You see blood or dark/black material when you vomit or have a bowel movement.  *You experience burning when you urinate, have blood in your urine, or experience a discharge.  *Your pain is not improving within 8-12 hours or is not gone within 24 hours. Call or return immediately if your pain is getting worse, changes location, or moves to your chest or back.  *You have shaking chills, or fever greater than 101.5 degrees Fahrenheit or 38 degrees Celsius.  *Any change in your symptoms, or any new symptoms that concern you.

## 2018-07-18 NOTE — PROGRESS NOTE ADULT - PROBLEM SELECTOR PLAN 7
The patient has an infiltrate in the right lower lobe on CAT scan. Patient has a elevated white count with negative urine culture. Start Levaquin

## 2018-07-18 NOTE — PROGRESS NOTE ADULT - PROBLEM SELECTOR PLAN 4
Following hemoglobin and no indication for blood transfusion at this point
Following hemoglobin and no indication for blood transfusion at this point

## 2018-07-18 NOTE — DISCHARGE NOTE ADULT - PLAN OF CARE
recovery General Discharge Instructions:  Please resume all regular home medications unless specifically advised not to take a particular medication. Also, please take any new medications as prescribed.  Please get plenty of rest, continue to ambulate several times per day, and drink adequate amounts of fluids. Avoid lifting weights greater than 5-10 lbs until you follow-up with your surgeon, who will instruct you further regarding activity restrictions.  Avoid driving or operating heavy machinery while taking pain medications.  Please follow-up with your surgeon and Primary Care Provider (PCP) as advised.  Incision Care:  *Please call your doctor or nurse practitioner if you have increased pain, swelling, redness, or drainage from the incision site.  *Avoid swimming and baths until your follow-up appointment.  *You may shower, and wash surgical incisions with a mild soap and warm water. Gently pat the area dry.  *If you have staples, they will be removed at your follow-up appointment.  *If you have steri-strips, they will fall off on their own. Please remove any remaining strips 7-10 days after surgery. Warning Signs:  Please call your doctor or nurse practitioner if you experience the following:  *You experience new chest pain, pressure, squeezing or tightness.  *New or worsening cough, shortness of breath, or wheeze.  *If you are vomiting and cannot keep down fluids or your medications.  *You are getting dehydrated due to continued vomiting, diarrhea, or other reasons. Signs of dehydration include dry mouth, rapid heartbeat, or feeling dizzy or faint when standing.  *You see blood or dark/black material when you vomit or have a bowel movement.  *You experience burning when you urinate, have blood in your urine, or experience a discharge.  *Your pain is not improving within 8-12 hours or is not gone within 24 hours. Call or return immediately if your pain is getting worse, changes location, or moves to your chest or back.  *You have shaking chills, or fever greater than 101.5 degrees Fahrenheit or 38 degrees Celsius.  *Any change in your symptoms, or any new symptoms that concern you. You have been prescribed oral antibiotics. Please be sure to complete the entire course as directed.    General Discharge Instructions:  Please resume all regular home medications unless specifically advised not to take a particular medication. Also, please take any new medications as prescribed.  Please get plenty of rest, continue to ambulate several times per day, and drink adequate amounts of fluids. Avoid lifting weights greater than 5-10 lbs until you follow-up with your surgeon, who will instruct you further regarding activity restrictions.  Avoid driving or operating heavy machinery while taking pain medications.  Please follow-up with your surgeon and Primary Care Provider (PCP) as advised.  Incision Care:  *Please call your doctor or nurse practitioner if you have increased pain, swelling, redness, or drainage from the incision site.  *Avoid swimming and baths until your follow-up appointment.  *You may shower, and wash surgical incisions with a mild soap and warm water. Gently pat the area dry.  *If you have staples, they will be removed at your follow-up appointment.  *If you have steri-strips, they will fall off on their own. Please remove any remaining strips 7-10 days after surgery.

## 2018-07-18 NOTE — PROGRESS NOTE ADULT - SUBJECTIVE AND OBJECTIVE BOX
Interval Events: Reviewed  Patient seen and examined at bedside.    Patient is a 73y old  Female who presents with a chief complaint of Abdominal pain (2018 15:00)    she is doing well and the pain is controlled  PAST MEDICAL & SURGICAL HISTORY:  Lyme disease  CAD (coronary artery disease)  Meningioma  High cholesterol  Colostomy in place  H/O meningioma of the brain  S/P EVERARDO (total abdominal hysterectomy)      MEDICATIONS:  Pulmonary:    Antimicrobials:    Anticoagulants:  aspirin  chewable 81 milliGRAM(s) Oral daily  heparin  Injectable 5000 Unit(s) SubCutaneous every 8 hours    Cardiac:      Allergies    No Known Allergies    Intolerances        Vital Signs Last 24 Hrs  T(C): 37.3 (2018 16:42), Max: 37.3 (2018 16:42)  T(F): 99.1 (2018 16:42), Max: 99.1 (2018 16:42)  HR: 95 (2018 16:42) (78 - 116)  BP: 127/85 (2018 16:42) (118/79 - 155/93)  BP(mean): --  RR: 17 (2018 16:42) (16 - 17)  SpO2: 95% (2018 16:42) (91% - 98%)     @ 07: @ 07:00  --------------------------------------------------------  IN: 0 mL / OUT: 500 mL / NET: -500 mL     @ 07: @ 19:31  --------------------------------------------------------  IN: 380 mL / OUT: 200 mL / NET: 180 mL          LABS:      CBC Full  -  ( 2018 12:25 )  WBC Count : 11.4 K/uL  Hemoglobin : 10.4 g/dL  Hematocrit : 30.8 %  Platelet Count - Automated : 559 K/uL  Mean Cell Volume : 91.4 fL  Mean Cell Hemoglobin : 30.9 pg  Mean Cell Hemoglobin Concentration : 33.8 g/dL  Auto Neutrophil # : x  Auto Lymphocyte # : x  Auto Monocyte # : x  Auto Eosinophil # : x  Auto Basophil # : x  Auto Neutrophil % : 83.1 %  Auto Lymphocyte % : 10.8 %  Auto Monocyte % : 4.8 %  Auto Eosinophil % : 1.0 %  Auto Basophil % : 0.3 %        139  |  100  |  11  ----------------------------<  124<H>  4.0   |  26  |  0.49<L>    Ca    9.1      2018 12:25    TPro  6.8  /  Alb  3.1<L>  /  TBili  0.7  /  DBili  x   /  AST  26  /  ALT  24  /  AlkPhos  95      PT/INR - ( 2018 12:25 )   PT: 13.2 sec;   INR: 1.19          PTT - ( 2018 12:25 )  PTT:21.6 sec      Urinalysis Basic - ( 2018 14:22 )    Color: Yellow / Appearance: Clear / S.020 / pH: x  Gluc: x / Ketone: 15 mg/dL  / Bili: Negative / Urobili: 1.0 E.U./dL   Blood: x / Protein: Trace mg/dL / Nitrite: NEGATIVE   Leuk Esterase: Trace / RBC: < 5 /HPF / WBC 5-10 /HPF   Sq Epi: x / Non Sq Epi: 5-10 /HPF / Bacteria: Present /HPF  < from: Xray Abdomen 2 View PORTABLE -Urgent (18 @ 12:29) >      INTERPRETATION:  Resident Preliminary Report    XR ABDOMEN PORTABLE URGENT 2V    CLINICAL INFORMATION: Abdominal pain     TECHNIQUE: AP radiograph of the abdomen dated 2018 12:29 PM    COMPARISON: Radiograph of the abdomen from 2018    FINDINGS: Mildly dilated bowel loops are seen. Small bowel is dilated up   to 3.2 cm. Large bowel measures up to 5.5 cm. No pneumatosis. No portal   venous gas. No abdominal free air. Degenerative changes in the spine are   noted.    IMPRESSION: Mild ileus    < end of copied text >            Culture Results:   Insignificant amount of mixed growth. ( @ 15:02)      RADIOLOGY & ADDITIONAL STUDIES (The following images were personally reviewed):  Sparks:                                     No  Urine output:                       adequate  DVT prophylaxis:                 Yes  Flattus:                                  Yes  Bowel movement:              No
S: Patient states her abdominal pain is improved. She denies nausea/vomiting. She has good ostomy output. She is ready to go to [facility] today. Tolerating regular diet.     O:     Vital Signs Last 24 Hrs  T(C): 36.9 (18 Jul 2018 08:30), Max: 37.5 (17 Jul 2018 20:48)  T(F): 98.4 (18 Jul 2018 08:30), Max: 99.5 (17 Jul 2018 20:48)  HR: 95 (18 Jul 2018 08:30) (94 - 100)  BP: 142/92 (18 Jul 2018 08:30) (121/78 - 147/92)  BP(mean): --  RR: 17 (18 Jul 2018 08:30) (16 - 17)  SpO2: 95% (18 Jul 2018 08:30) (93% - 96%)  I&O's Summary    17 Jul 2018 07:01  -  18 Jul 2018 07:00  --------------------------------------------------------  IN: 680 mL / OUT: 800 mL / NET: -120 mL    18 Jul 2018 07:01  -  18 Jul 2018 11:46  --------------------------------------------------------  IN: 0 mL / OUT: 150 mL / NET: -150 mL      Gen - NAD  Resp - normal respiratory effort  Abd soft, non-tender TTP, non distended, ostomy with soft stool, incision C/D/I                          10.4   11.4  )-----------( 559      ( 16 Jul 2018 12:25 )             30.8   07-16    139  |  100  |  11  ----------------------------<  124<H>  4.0   |  26  |  0.49<L>    Ca    9.1      16 Jul 2018 12:25    TPro  6.8  /  Alb  3.1<L>  /  TBili  0.7  /  DBili  x   /  AST  26  /  ALT  24  /  AlkPhos  95  07-16  LIVER FUNCTIONS - ( 16 Jul 2018 12:25 )  Alb: 3.1 g/dL / Pro: 6.8 g/dL / ALK PHOS: 95 U/L / ALT: 24 U/L / AST: 26 U/L / GGT: x
S: Patient states she has mild pain in her abdomen. She denies nausea/vomiting. She has good ostomy output.    O:     Vital Signs Last 24 Hrs  T(C): 36.9 (17 Jul 2018 08:15), Max: 37.3 (16 Jul 2018 16:16)  T(F): 98.4 (17 Jul 2018 08:15), Max: 99.1 (16 Jul 2018 16:16)  HR: 97 (17 Jul 2018 08:15) (78 - 116)  BP: 127/84 (17 Jul 2018 08:15) (118/79 - 159/92)  BP(mean): --  RR: 16 (17 Jul 2018 08:15) (16 - 18)  SpO2: 91% (17 Jul 2018 08:15) (91% - 98%)  I&O's Summary    16 Jul 2018 07:01  -  17 Jul 2018 07:00  --------------------------------------------------------  IN: 0 mL / OUT: 500 mL / NET: -500 mL        Gen - NAD  Resp - normal respiratory effort  Abd soft, non-tender TTP, non distended, ostomy with soft stool, incision C/D/I                          10.4   11.4  )-----------( 559      ( 16 Jul 2018 12:25 )             30.8   07-16    139  |  100  |  11  ----------------------------<  124<H>  4.0   |  26  |  0.49<L>    Ca    9.1      16 Jul 2018 12:25    TPro  6.8  /  Alb  3.1<L>  /  TBili  0.7  /  DBili  x   /  AST  26  /  ALT  24  /  AlkPhos  95  07-16  LIVER FUNCTIONS - ( 16 Jul 2018 12:25 )  Alb: 3.1 g/dL / Pro: 6.8 g/dL / ALK PHOS: 95 U/L / ALT: 24 U/L / AST: 26 U/L / GGT: x
Interval Events: Reviewed  Patient seen and examined at bedside.    Patient is a 73y old  Female who presents with a chief complaint of Abdominal pain (18 Jul 2018 10:20)    She is complaining of pain on the right side of the chest and going down to the abdomen. She is still having problems breathing when she walks around but she is weak  PAST MEDICAL & SURGICAL HISTORY:  Lyme disease  CAD (coronary artery disease)  Meningioma  High cholesterol  Colostomy in place  H/O meningioma of the brain  S/P EVERARDO (total abdominal hysterectomy)      MEDICATIONS:  Pulmonary:    Antimicrobials:    Anticoagulants:  aspirin  chewable 81 milliGRAM(s) Oral daily  heparin  Injectable 5000 Unit(s) SubCutaneous every 8 hours    Cardiac:      Allergies    No Known Allergies    Intolerances        Vital Signs Last 24 Hrs  T(C): 36.8 (18 Jul 2018 14:01), Max: 37.5 (17 Jul 2018 20:48)  T(F): 98.2 (18 Jul 2018 14:01), Max: 99.5 (17 Jul 2018 20:48)  HR: 91 (18 Jul 2018 14:01) (91 - 97)  BP: 138/86 (18 Jul 2018 14:01) (127/85 - 147/92)  BP(mean): --  RR: 16 (18 Jul 2018 14:01) (16 - 17)  SpO2: 94% (18 Jul 2018 14:01) (93% - 96%)    07-17 @ 07:01  -  07-18 @ 07:00  --------------------------------------------------------  IN: 680 mL / OUT: 800 mL / NET: -120 mL    07-18 @ 07:01  -  07-18 @ 14:41  --------------------------------------------------------  IN: 118 mL / OUT: 150 mL / NET: -32 mL          LABS:                              Culture Results:   Insignificant amount of mixed growth. (07-16 @ 15:02)      RADIOLOGY & ADDITIONAL STUDIES (The following images were personally reviewed):  Sparks:                                     No  Urine output:                       adequate  DVT prophylaxis:                 Yes  Flattus:                                  Yes  Bowel movement:              No

## 2018-07-18 NOTE — DISCHARGE NOTE ADULT - PATIENT PORTAL LINK FT
You can access the [facility] Portal, offered by [facility], by registering with the following website: [email or web address]

## 2018-07-18 NOTE — DISCHARGE NOTE ADULT - MEDICATION SUMMARY - MEDICATIONS TO TAKE
I will START or STAY ON the medications listed below when I get home from the hospital:    aspirin 81 mg oral tablet  -- 1 tab(s) by mouth once a day  -- Indication: For Home medication     aluminum hydroxide-magnesium hydroxide 200 mg-200 mg/5 mL oral suspension  -- 30 milliliter(s) by mouth every 4 hours, As needed, Dyspepsia  -- Indication: For Home medication     levETIRAcetam 500 mg oral tablet  -- 1 tab(s) by mouth 2 times a day  -- Indication: For Home medication     atorvastatin  -- 40 milligram(s) by mouth once (at bedtime)  -- Indication: For Home medication     simethicone 40 mg oral disintegrating strip  -- 40 milligram(s) by mouth 3 times a day (after meals), As Needed   -- Indication: For Home medication I will START or STAY ON the medications listed below when I get home from the hospital:    aspirin 81 mg oral tablet  -- 1 tab(s) by mouth once a day  -- Indication: For Home medication     aluminum hydroxide-magnesium hydroxide 200 mg-200 mg/5 mL oral suspension  -- 30 milliliter(s) by mouth every 4 hours, As needed, Dyspepsia  -- Indication: For Home medication     levETIRAcetam 500 mg oral tablet  -- 1 tab(s) by mouth 2 times a day  -- Indication: For Home medication     atorvastatin  -- 40 milligram(s) by mouth once (at bedtime)  -- Indication: For Home medication     simethicone 40 mg oral disintegrating strip  -- 40 milligram(s) by mouth 3 times a day (after meals), As Needed   -- Indication: For Home medication     Levaquin 750 mg oral tablet  -- 1 tab(s) by mouth every 24 hours   -- Avoid prolonged or excessive exposure to direct and/or artificial sunlight while taking this medication.  Do not take dairy products, antacids, or iron preparations within one hour of this medication.  Finish all this medication unless otherwise directed by prescriber.  May cause drowsiness or dizziness.  Medication should be taken with plenty of water.    -- Indication: For Possible pneumonia

## 2018-07-24 DIAGNOSIS — R10.32 LEFT LOWER QUADRANT PAIN: ICD-10-CM

## 2018-07-24 DIAGNOSIS — D62 ACUTE POSTHEMORRHAGIC ANEMIA: ICD-10-CM

## 2018-07-24 DIAGNOSIS — Z93.3 COLOSTOMY STATUS: ICD-10-CM

## 2018-07-24 DIAGNOSIS — K92.0 HEMATEMESIS: ICD-10-CM

## 2018-07-24 DIAGNOSIS — J90 PLEURAL EFFUSION, NOT ELSEWHERE CLASSIFIED: ICD-10-CM

## 2018-07-24 DIAGNOSIS — I25.10 ATHEROSCLEROTIC HEART DISEASE OF NATIVE CORONARY ARTERY WITHOUT ANGINA PECTORIS: ICD-10-CM

## 2018-07-24 DIAGNOSIS — J98.11 ATELECTASIS: ICD-10-CM

## 2018-07-24 DIAGNOSIS — E78.5 HYPERLIPIDEMIA, UNSPECIFIED: ICD-10-CM

## 2018-07-24 DIAGNOSIS — J18.9 PNEUMONIA, UNSPECIFIED ORGANISM: ICD-10-CM

## 2018-07-24 DIAGNOSIS — Z95.5 PRESENCE OF CORONARY ANGIOPLASTY IMPLANT AND GRAFT: ICD-10-CM

## 2018-09-16 NOTE — ED ADULT NURSE NOTE - PRIMARY CARE PROVIDER
Jenae -A1c of 6.1%.   -Advised patient to continue with diet/lifestyle modification  -Consider starting metformin -A1c of 6.1%.   -Advised patient to continue with diet/lifestyle modification  -Consider starting metformin as outpatient -A1c of 6.1%.   -Advised patient to continue with diet/lifestyle modification  -Consider starting metformin as outpatient  - monitor blood sugar for now

## 2018-09-19 PROBLEM — D32.9 BENIGN NEOPLASM OF MENINGES, UNSPECIFIED: Chronic | Status: ACTIVE | Noted: 2018-06-25

## 2018-09-19 PROBLEM — A69.20 LYME DISEASE, UNSPECIFIED: Chronic | Status: ACTIVE | Noted: 2018-06-25

## 2018-10-10 ENCOUNTER — APPOINTMENT (OUTPATIENT)
Dept: PULMONOLOGY | Facility: CLINIC | Age: 73
End: 2018-10-10
Payer: MEDICARE

## 2018-10-10 ENCOUNTER — OUTPATIENT (OUTPATIENT)
Dept: OUTPATIENT SERVICES | Facility: HOSPITAL | Age: 73
LOS: 1 days | End: 2018-10-10
Payer: MEDICARE

## 2018-10-10 ENCOUNTER — NON-APPOINTMENT (OUTPATIENT)
Age: 73
End: 2018-10-10

## 2018-10-10 ENCOUNTER — LABORATORY RESULT (OUTPATIENT)
Age: 73
End: 2018-10-10

## 2018-10-10 VITALS
OXYGEN SATURATION: 98 % | HEART RATE: 82 BPM | SYSTOLIC BLOOD PRESSURE: 120 MMHG | DIASTOLIC BLOOD PRESSURE: 70 MMHG | WEIGHT: 133 LBS | TEMPERATURE: 98.7 F | HEIGHT: 66 IN | RESPIRATION RATE: 12 BRPM | BODY MASS INDEX: 21.38 KG/M2

## 2018-10-10 DIAGNOSIS — Z90.710 ACQUIRED ABSENCE OF BOTH CERVIX AND UTERUS: Chronic | ICD-10-CM

## 2018-10-10 DIAGNOSIS — Z93.3 COLOSTOMY STATUS: Chronic | ICD-10-CM

## 2018-10-10 DIAGNOSIS — Z01.818 ENCOUNTER FOR OTHER PREPROCEDURAL EXAMINATION: ICD-10-CM

## 2018-10-10 DIAGNOSIS — K56.609 UNSPECIFIED INTESTINAL OBSTRUCTION, UNSPECIFIED AS TO PARTIAL VERSUS COMPLETE OBSTRUCTION: ICD-10-CM

## 2018-10-10 DIAGNOSIS — Z86.011 PERSONAL HISTORY OF BENIGN NEOPLASM OF THE BRAIN: ICD-10-CM

## 2018-10-10 DIAGNOSIS — I10 ESSENTIAL (PRIMARY) HYPERTENSION: ICD-10-CM

## 2018-10-10 DIAGNOSIS — Z87.09 PERSONAL HISTORY OF OTHER DISEASES OF THE RESPIRATORY SYSTEM: ICD-10-CM

## 2018-10-10 DIAGNOSIS — Z86.011 PERSONAL HISTORY OF BENIGN NEOPLASM OF THE BRAIN: Chronic | ICD-10-CM

## 2018-10-10 DIAGNOSIS — Z82.49 FAMILY HISTORY OF ISCHEMIC HEART DISEASE AND OTHER DISEASES OF THE CIRCULATORY SYSTEM: ICD-10-CM

## 2018-10-10 DIAGNOSIS — Z87.01 PERSONAL HISTORY OF PNEUMONIA (RECURRENT): ICD-10-CM

## 2018-10-10 PROCEDURE — 93000 ELECTROCARDIOGRAM COMPLETE: CPT

## 2018-10-10 PROCEDURE — 71046 X-RAY EXAM CHEST 2 VIEWS: CPT | Mod: 26

## 2018-10-10 PROCEDURE — 36415 COLL VENOUS BLD VENIPUNCTURE: CPT

## 2018-10-10 PROCEDURE — 71046 X-RAY EXAM CHEST 2 VIEWS: CPT

## 2018-10-10 PROCEDURE — 99204 OFFICE O/P NEW MOD 45 MIN: CPT | Mod: 25

## 2018-10-10 NOTE — HISTORY OF PRESENT ILLNESS
[No Pertinent Cardiac History] : no history of aortic stenosis, atrial fibrillation, coronary artery disease, recent myocardial infarction, or implantable device/pacemaker [Coronary Artery Disease] : coronary artery disease [No Pertinent Pulmonary History] : no history of asthma, COPD, sleep apnea, or smoking [No Adverse Anesthesia Reaction] : no adverse anesthesia reaction in self or family member [(Patient denies any chest pain, claudication, dyspnea on exertion, orthopnea, palpitations or syncope)] : Patient denies any chest pain, claudication, dyspnea on exertion, orthopnea, palpitations or syncope [Good (7-10 METs)] : Good (7-10 METs) [Chronic Anticoagulation] : no chronic anticoagulation [Chronic Kidney Disease] : no chronic kidney disease [Diabetes] : no diabetes [FreeTextEntry1] : Removal of blockage of the colon and reversal of Colostomy [FreeTextEntry2] : 11/9/2018 [FreeTextEntry3] : Dr. Green [FreeTextEntry4] : 73 year old female with PMH of CAD (PCI in 2004), meningioma, HLD, insomnia who recently saw Dr. Irving (cardiology) w/ complaints of CORLEY and SOB and was sent for CXR  She presents today for clearance for reversal of colostomy and colon blockage.  She was originally sent by Dr. Hollis (PCP) to the hospital for emergent Surg.  She had a blockage at the time and had emergent colostomy Surg in June 2018.  After discharge from the hospital she was in rehab for 2 months.  She is finished Physcial therapy in the nursing and walks with a cane.  She is walking every day without SOB.  Pt emptying the colostomy 2-3 times a day, denies vomiting, denies any bloody stool or denies any fever.  Denies any chest pain, palpitation, cough, wheezing or difficulty with urine.

## 2018-10-10 NOTE — RESULTS/DATA
[] : not indicated [ECG Reviewed] : reviewed [Normal] : The 12 - lead ECG is normal [NSR] : normal sinus rhythm [de-identified] : Pending [de-identified] : Pending [de-identified] : pending [de-identified] : pending [FreeTextEntry1] : short HI interval

## 2018-10-10 NOTE — ASSESSMENT
[High Risk Surgery - Intraperitoneal, Intrathoracic or Supringuinal Vascular Procedures] : High Risk Surgery - Intraperitoneal, Intrathoracic or Supringuinal Vascular Procedures - Yes (1) [Ischemic Heart Disease] : Ischemic Heart Disease - No (0) [Congestive Heart Failure] : Congestive Heart Failure - No (0) [Prior Cerebrovascular Accident or TIA] : Prior Cerebrovascular Accident or TIA - No (0) [Creatinine >= 2mg/dL (1 Point)] : Creatinine >= 2mg/dL - No (0) [Insulin-dependent Diabetic (1 Point)] : Insulin-dependent Diabetic - No (0) [1] : 1 , RCRI Class: II, Risk of Post-Op Cardiac Complications: 0.9%, Procedure Risk: Low-Risk [Patient Optimized for Surgery] : Patient optimized for surgery [No Further Testing Recommended] : no further testing recommended [ECG] : ECG [As per surgery] : as per surgery

## 2018-10-10 NOTE — PLAN
[FreeTextEntry1] : Pt scheduled for Surg reversal of colostomy.\par \par No contraindication for surgery. The medical condition is optimized for surgery. SALUD KRUEGER is to be extubated once fully awake and able to protect airway. she is to be monitored in the recovery room.  They might benefit for high flow oxygen or noninvasive ventilation to prevent or reverse atelectasis.  Avoid oversedation. Patient is high risk for DVT and will require bimodal agents for DVT prophylaxis early mobilization is recommended. Patient is to use the incentive spirometry postoperative.  The patient was given instructions regarding the preoperative preparation. I instructed the patient to notify me if there is any change in the clinical status prior the surgery including any skin manifestations. No aspirin or NSAIDs, Naproxen, FARAH inhibitors, herbs, green tea and vitamins prior to surgery. NPO after midnight prior to surg. Pt instructed to take keppra the morning of surg with little sip of water. \par \par CAD - Continue on statin and tolerating without any side effects.  She is complaint with diet.  No signs of chest pain, palpitation or edema.\par \par \par \par \par

## 2018-10-15 ENCOUNTER — RX RENEWAL (OUTPATIENT)
Age: 73
End: 2018-10-15

## 2018-10-15 LAB
ALBUMIN SERPL ELPH-MCNC: 4.3 G/DL
ALP BLD-CCNC: 104 U/L
ALT SERPL-CCNC: 18 U/L
ANION GAP SERPL CALC-SCNC: 12 MMOL/L
APPEARANCE: CLEAR
APTT BLD: 28 SEC
AST SERPL-CCNC: 21 U/L
BASOPHILS # BLD AUTO: 0.04 K/UL
BASOPHILS NFR BLD AUTO: 0.6 %
BILIRUB SERPL-MCNC: 0.5 MG/DL
BILIRUBIN URINE: NEGATIVE
BLOOD URINE: ABNORMAL
BUN SERPL-MCNC: 7 MG/DL
CALCIUM SERPL-MCNC: 9.2 MG/DL
CHLORIDE SERPL-SCNC: 105 MMOL/L
CO2 SERPL-SCNC: 26 MMOL/L
COLOR: ABNORMAL
CREAT SERPL-MCNC: 0.59 MG/DL
EOSINOPHIL # BLD AUTO: 0.11 K/UL
EOSINOPHIL NFR BLD AUTO: 1.6 %
GLUCOSE QUALITATIVE U: NEGATIVE MG/DL
GLUCOSE SERPL-MCNC: 99 MG/DL
HCT VFR BLD CALC: 39.6 %
HGB BLD-MCNC: 12.3 G/DL
IMM GRANULOCYTES NFR BLD AUTO: 0.1 %
INR PPP: 0.94 RATIO
KETONES URINE: ABNORMAL
LEUKOCYTE ESTERASE URINE: ABNORMAL
LYMPHOCYTES # BLD AUTO: 1.95 K/UL
LYMPHOCYTES NFR BLD AUTO: 28.3 %
MAN DIFF?: NORMAL
MCHC RBC-ENTMCNC: 29.6 PG
MCHC RBC-ENTMCNC: 31.1 GM/DL
MCV RBC AUTO: 95.2 FL
MONOCYTES # BLD AUTO: 0.4 K/UL
MONOCYTES NFR BLD AUTO: 5.8 %
NEUTROPHILS # BLD AUTO: 4.38 K/UL
NEUTROPHILS NFR BLD AUTO: 63.6 %
NITRITE URINE: NEGATIVE
PH URINE: 6.5
PLATELET # BLD AUTO: 262 K/UL
POTASSIUM SERPL-SCNC: 4.4 MMOL/L
PROT SERPL-MCNC: 6.8 G/DL
PROTEIN URINE: ABNORMAL MG/DL
PT BLD: 10.6 SEC
RBC # BLD: 4.16 M/UL
RBC # FLD: 15 %
SODIUM SERPL-SCNC: 143 MMOL/L
SPECIFIC GRAVITY URINE: 1.02
UROBILINOGEN URINE: NEGATIVE MG/DL
WBC # FLD AUTO: 6.89 K/UL

## 2018-11-08 VITALS
RESPIRATION RATE: 18 BRPM | WEIGHT: 131.84 LBS | DIASTOLIC BLOOD PRESSURE: 84 MMHG | OXYGEN SATURATION: 97 % | HEART RATE: 74 BPM | HEIGHT: 66 IN | SYSTOLIC BLOOD PRESSURE: 143 MMHG | TEMPERATURE: 98 F

## 2018-11-08 RX ORDER — INFLUENZA VIRUS VACCINE 15; 15; 15; 15 UG/.5ML; UG/.5ML; UG/.5ML; UG/.5ML
0.5 SUSPENSION INTRAMUSCULAR ONCE
Qty: 0 | Refills: 0 | Status: DISCONTINUED | OUTPATIENT
Start: 2018-11-09 | End: 2018-11-17

## 2018-11-09 ENCOUNTER — INPATIENT (INPATIENT)
Facility: HOSPITAL | Age: 73
LOS: 7 days | Discharge: HOME | DRG: 330 | End: 2018-11-17
Attending: SURGERY | Admitting: SURGERY
Payer: MEDICARE

## 2018-11-09 ENCOUNTER — RESULT REVIEW (OUTPATIENT)
Age: 73
End: 2018-11-09

## 2018-11-09 DIAGNOSIS — Z90.49 ACQUIRED ABSENCE OF OTHER SPECIFIED PARTS OF DIGESTIVE TRACT: Chronic | ICD-10-CM

## 2018-11-09 DIAGNOSIS — Z93.3 COLOSTOMY STATUS: Chronic | ICD-10-CM

## 2018-11-09 DIAGNOSIS — Z98.890 OTHER SPECIFIED POSTPROCEDURAL STATES: Chronic | ICD-10-CM

## 2018-11-09 DIAGNOSIS — Z90.710 ACQUIRED ABSENCE OF BOTH CERVIX AND UTERUS: Chronic | ICD-10-CM

## 2018-11-09 PROBLEM — I25.10 ATHEROSCLEROTIC HEART DISEASE OF NATIVE CORONARY ARTERY WITHOUT ANGINA PECTORIS: Chronic | Status: ACTIVE | Noted: 2018-06-25

## 2018-11-09 LAB
ANION GAP SERPL CALC-SCNC: 18 MMOL/L — HIGH (ref 5–17)
BUN SERPL-MCNC: 10 MG/DL — SIGNIFICANT CHANGE UP (ref 7–23)
CALCIUM SERPL-MCNC: 9.2 MG/DL — SIGNIFICANT CHANGE UP (ref 8.4–10.5)
CHLORIDE SERPL-SCNC: 99 MMOL/L — SIGNIFICANT CHANGE UP (ref 96–108)
CO2 SERPL-SCNC: 21 MMOL/L — LOW (ref 22–31)
CREAT SERPL-MCNC: 0.64 MG/DL — SIGNIFICANT CHANGE UP (ref 0.5–1.3)
GLUCOSE BLDC GLUCOMTR-MCNC: 118 MG/DL — HIGH (ref 70–99)
GLUCOSE SERPL-MCNC: 162 MG/DL — HIGH (ref 70–99)
HCT VFR BLD CALC: 35.4 % — SIGNIFICANT CHANGE UP (ref 34.5–45)
HGB BLD-MCNC: 11.9 G/DL — SIGNIFICANT CHANGE UP (ref 11.5–15.5)
LYMPHOCYTES # BLD AUTO: 8 % — LOW (ref 13–44)
MCHC RBC-ENTMCNC: 29.7 PG — SIGNIFICANT CHANGE UP (ref 27–34)
MCHC RBC-ENTMCNC: 33.6 G/DL — SIGNIFICANT CHANGE UP (ref 32–36)
MCV RBC AUTO: 88.3 FL — SIGNIFICANT CHANGE UP (ref 80–100)
MONOCYTES NFR BLD AUTO: 11 % — SIGNIFICANT CHANGE UP (ref 2–14)
NEUTROPHILS NFR BLD AUTO: 70 % — SIGNIFICANT CHANGE UP (ref 43–77)
PLATELET # BLD AUTO: 192 K/UL — SIGNIFICANT CHANGE UP (ref 150–400)
POTASSIUM SERPL-MCNC: 3.8 MMOL/L — SIGNIFICANT CHANGE UP (ref 3.5–5.3)
POTASSIUM SERPL-SCNC: 3.8 MMOL/L — SIGNIFICANT CHANGE UP (ref 3.5–5.3)
RBC # BLD: 4.01 M/UL — SIGNIFICANT CHANGE UP (ref 3.8–5.2)
RBC # FLD: 14.5 % — SIGNIFICANT CHANGE UP (ref 10.3–16.9)
SODIUM SERPL-SCNC: 138 MMOL/L — SIGNIFICANT CHANGE UP (ref 135–145)
WBC # BLD: 8 K/UL — SIGNIFICANT CHANGE UP (ref 3.8–10.5)
WBC # FLD AUTO: 8 K/UL — SIGNIFICANT CHANGE UP (ref 3.8–10.5)

## 2018-11-09 RX ORDER — SODIUM CHLORIDE 9 MG/ML
1000 INJECTION, SOLUTION INTRAVENOUS
Qty: 0 | Refills: 0 | Status: DISCONTINUED | OUTPATIENT
Start: 2018-11-09 | End: 2018-11-12

## 2018-11-09 RX ORDER — ACETAMINOPHEN 500 MG
1000 TABLET ORAL ONCE
Qty: 0 | Refills: 0 | Status: COMPLETED | OUTPATIENT
Start: 2018-11-09 | End: 2018-11-09

## 2018-11-09 RX ORDER — BUPIVACAINE 13.3 MG/ML
20 INJECTION, SUSPENSION, LIPOSOMAL INFILTRATION ONCE
Qty: 0 | Refills: 0 | Status: DISCONTINUED | OUTPATIENT
Start: 2018-11-09 | End: 2018-11-12

## 2018-11-09 RX ORDER — ONDANSETRON 8 MG/1
4 TABLET, FILM COATED ORAL EVERY 6 HOURS
Qty: 0 | Refills: 0 | Status: DISCONTINUED | OUTPATIENT
Start: 2018-11-09 | End: 2018-11-17

## 2018-11-09 RX ORDER — HYDROMORPHONE HYDROCHLORIDE 2 MG/ML
0.3 INJECTION INTRAMUSCULAR; INTRAVENOUS; SUBCUTANEOUS
Qty: 0 | Refills: 0 | Status: DISCONTINUED | OUTPATIENT
Start: 2018-11-09 | End: 2018-11-10

## 2018-11-09 RX ORDER — ACETAMINOPHEN 500 MG
1000 TABLET ORAL ONCE
Qty: 0 | Refills: 0 | Status: COMPLETED | OUTPATIENT
Start: 2018-11-10 | End: 2018-11-10

## 2018-11-09 RX ORDER — HEPARIN SODIUM 5000 [USP'U]/ML
5000 INJECTION INTRAVENOUS; SUBCUTANEOUS EVERY 8 HOURS
Qty: 0 | Refills: 0 | Status: DISCONTINUED | OUTPATIENT
Start: 2018-11-09 | End: 2018-11-17

## 2018-11-09 RX ORDER — LEVETIRACETAM 250 MG/1
500 TABLET, FILM COATED ORAL EVERY 12 HOURS
Qty: 0 | Refills: 0 | Status: DISCONTINUED | OUTPATIENT
Start: 2018-11-09 | End: 2018-11-16

## 2018-11-09 RX ADMIN — HEPARIN SODIUM 5000 UNIT(S): 5000 INJECTION INTRAVENOUS; SUBCUTANEOUS at 21:01

## 2018-11-09 RX ADMIN — Medication 1000 MILLIGRAM(S): at 14:45

## 2018-11-09 RX ADMIN — Medication 400 MILLIGRAM(S): at 21:01

## 2018-11-09 RX ADMIN — Medication 400 MILLIGRAM(S): at 14:28

## 2018-11-09 RX ADMIN — HYDROMORPHONE HYDROCHLORIDE 0.3 MILLIGRAM(S): 2 INJECTION INTRAMUSCULAR; INTRAVENOUS; SUBCUTANEOUS at 16:41

## 2018-11-09 RX ADMIN — SODIUM CHLORIDE 100 MILLILITER(S): 9 INJECTION, SOLUTION INTRAVENOUS at 14:28

## 2018-11-09 RX ADMIN — LEVETIRACETAM 420 MILLIGRAM(S): 250 TABLET, FILM COATED ORAL at 19:28

## 2018-11-09 RX ADMIN — HYDROMORPHONE HYDROCHLORIDE 0.3 MILLIGRAM(S): 2 INJECTION INTRAMUSCULAR; INTRAVENOUS; SUBCUTANEOUS at 16:58

## 2018-11-09 RX ADMIN — Medication 1000 MILLIGRAM(S): at 21:20

## 2018-11-09 NOTE — BRIEF OPERATIVE NOTE - OPERATION/FINDINGS
Exploratory laparotomy, lysis of adhesions to anterior abdominal wall, from omentum, descending colon,, pelvic dissection with dense adhesions from sigmoid colon to bladder, left ureter visualized and protected. Colostomy take down followed by splenic flexure mobilization, transection of large bowel proximal to loop colostomy and at rectum. Primary anastomosis with 29mm EEA stapler. Hemostasis obtained. Abdomen irrigated. Colorectal bundle for closing, fascia of colostomy and midline incision closed primarily. Ostomy site packed with gauze

## 2018-11-09 NOTE — H&P PST ADULT - ASSESSMENT
73F pt with PMH Large bowel obstruction s/p descending loop colostomy, here for elective take down of colostomy and resection of affected portion.    -Pre op SQH  -To OR for above procedure  -Admit to Dr. Green

## 2018-11-09 NOTE — H&P PST ADULT - PMH
Bowel obstruction    CAD (coronary artery disease)  cardaic stent x 1  H/O pleural effusion    High cholesterol    Lyme disease    Meningioma

## 2018-11-09 NOTE — H&P PST ADULT - PSH
Colostomy in place    History of appendectomy    History of craniotomy  for menigioma, 2017  History of laparoscopy  for endometriosis x 2  S/P EVERARDO (total abdominal hysterectomy)

## 2018-11-09 NOTE — BRIEF OPERATIVE NOTE - PROCEDURE
<<-----Click on this checkbox to enter Procedure Lysis of adhesions  11/09/2018    Active  CDEJESUS  Open sigmoidectomy  11/09/2018    Active  CDEJESUS  Revision, closure, or reversal, colostomy or ileostomy  11/09/2018    Active  EERICKAS

## 2018-11-09 NOTE — H&P PST ADULT - HISTORY OF PRESENT ILLNESS
73F w/PMH HLD, CAD s/p stent on ASA, meningioma s/p resection 8/2017, endometriosis s/p EVERARDO BSO, & h/o colonic volvulus s/p ex lap, colopexy & appendectomy who was recently with prior admission for large bowel obstruction requiring a diverting loop colostomy, here today for an elective reversal of her colostomy and removal of diseased segment. Patient with recent colonoscopy that did not reveal any cancer. Denies any complaints.

## 2018-11-10 LAB
ANION GAP SERPL CALC-SCNC: 14 MMOL/L — SIGNIFICANT CHANGE UP (ref 5–17)
BUN SERPL-MCNC: 10 MG/DL — SIGNIFICANT CHANGE UP (ref 7–23)
CALCIUM SERPL-MCNC: 8.6 MG/DL — SIGNIFICANT CHANGE UP (ref 8.4–10.5)
CHLORIDE SERPL-SCNC: 101 MMOL/L — SIGNIFICANT CHANGE UP (ref 96–108)
CO2 SERPL-SCNC: 24 MMOL/L — SIGNIFICANT CHANGE UP (ref 22–31)
CREAT SERPL-MCNC: 0.65 MG/DL — SIGNIFICANT CHANGE UP (ref 0.5–1.3)
GLUCOSE SERPL-MCNC: 127 MG/DL — HIGH (ref 70–99)
HCT VFR BLD CALC: 31.4 % — LOW (ref 34.5–45)
HGB BLD-MCNC: 10.5 G/DL — LOW (ref 11.5–15.5)
MAGNESIUM SERPL-MCNC: 1.7 MG/DL — SIGNIFICANT CHANGE UP (ref 1.6–2.6)
MCHC RBC-ENTMCNC: 29.9 PG — SIGNIFICANT CHANGE UP (ref 27–34)
MCHC RBC-ENTMCNC: 33.4 G/DL — SIGNIFICANT CHANGE UP (ref 32–36)
MCV RBC AUTO: 89.5 FL — SIGNIFICANT CHANGE UP (ref 80–100)
PHOSPHATE SERPL-MCNC: 3.3 MG/DL — SIGNIFICANT CHANGE UP (ref 2.5–4.5)
PLATELET # BLD AUTO: 177 K/UL — SIGNIFICANT CHANGE UP (ref 150–400)
POTASSIUM SERPL-MCNC: 3.9 MMOL/L — SIGNIFICANT CHANGE UP (ref 3.5–5.3)
POTASSIUM SERPL-SCNC: 3.9 MMOL/L — SIGNIFICANT CHANGE UP (ref 3.5–5.3)
RBC # BLD: 3.51 M/UL — LOW (ref 3.8–5.2)
RBC # FLD: 14.1 % — SIGNIFICANT CHANGE UP (ref 10.3–16.9)
SODIUM SERPL-SCNC: 139 MMOL/L — SIGNIFICANT CHANGE UP (ref 135–145)
WBC # BLD: 7.2 K/UL — SIGNIFICANT CHANGE UP (ref 3.8–10.5)
WBC # FLD AUTO: 7.2 K/UL — SIGNIFICANT CHANGE UP (ref 3.8–10.5)

## 2018-11-10 RX ORDER — POTASSIUM CHLORIDE 20 MEQ
20 PACKET (EA) ORAL ONCE
Qty: 0 | Refills: 0 | Status: COMPLETED | OUTPATIENT
Start: 2018-11-10 | End: 2018-11-10

## 2018-11-10 RX ORDER — MAGNESIUM SULFATE 500 MG/ML
2 VIAL (ML) INJECTION ONCE
Qty: 0 | Refills: 0 | Status: COMPLETED | OUTPATIENT
Start: 2018-11-10 | End: 2018-11-10

## 2018-11-10 RX ORDER — ASPIRIN/CALCIUM CARB/MAGNESIUM 324 MG
81 TABLET ORAL EVERY OTHER DAY
Qty: 0 | Refills: 0 | Status: DISCONTINUED | OUTPATIENT
Start: 2018-11-10 | End: 2018-11-17

## 2018-11-10 RX ORDER — HYDROMORPHONE HYDROCHLORIDE 2 MG/ML
0.3 INJECTION INTRAMUSCULAR; INTRAVENOUS; SUBCUTANEOUS EVERY 4 HOURS
Qty: 0 | Refills: 0 | Status: DISCONTINUED | OUTPATIENT
Start: 2018-11-10 | End: 2018-11-12

## 2018-11-10 RX ADMIN — LEVETIRACETAM 420 MILLIGRAM(S): 250 TABLET, FILM COATED ORAL at 17:53

## 2018-11-10 RX ADMIN — HYDROMORPHONE HYDROCHLORIDE 0.3 MILLIGRAM(S): 2 INJECTION INTRAMUSCULAR; INTRAVENOUS; SUBCUTANEOUS at 12:17

## 2018-11-10 RX ADMIN — HYDROMORPHONE HYDROCHLORIDE 0.3 MILLIGRAM(S): 2 INJECTION INTRAMUSCULAR; INTRAVENOUS; SUBCUTANEOUS at 04:44

## 2018-11-10 RX ADMIN — Medication 1000 MILLIGRAM(S): at 05:38

## 2018-11-10 RX ADMIN — HYDROMORPHONE HYDROCHLORIDE 0.3 MILLIGRAM(S): 2 INJECTION INTRAMUSCULAR; INTRAVENOUS; SUBCUTANEOUS at 04:21

## 2018-11-10 RX ADMIN — Medication 400 MILLIGRAM(S): at 05:02

## 2018-11-10 RX ADMIN — HYDROMORPHONE HYDROCHLORIDE 0.3 MILLIGRAM(S): 2 INJECTION INTRAMUSCULAR; INTRAVENOUS; SUBCUTANEOUS at 20:17

## 2018-11-10 RX ADMIN — HEPARIN SODIUM 5000 UNIT(S): 5000 INJECTION INTRAVENOUS; SUBCUTANEOUS at 20:21

## 2018-11-10 RX ADMIN — HYDROMORPHONE HYDROCHLORIDE 0.3 MILLIGRAM(S): 2 INJECTION INTRAMUSCULAR; INTRAVENOUS; SUBCUTANEOUS at 21:03

## 2018-11-10 RX ADMIN — HEPARIN SODIUM 5000 UNIT(S): 5000 INJECTION INTRAVENOUS; SUBCUTANEOUS at 13:12

## 2018-11-10 RX ADMIN — Medication 50 GRAM(S): at 11:12

## 2018-11-10 RX ADMIN — HYDROMORPHONE HYDROCHLORIDE 0.3 MILLIGRAM(S): 2 INJECTION INTRAMUSCULAR; INTRAVENOUS; SUBCUTANEOUS at 12:02

## 2018-11-10 RX ADMIN — Medication 20 MILLIEQUIVALENT(S): at 11:12

## 2018-11-10 RX ADMIN — Medication 81 MILLIGRAM(S): at 12:02

## 2018-11-10 RX ADMIN — LEVETIRACETAM 420 MILLIGRAM(S): 250 TABLET, FILM COATED ORAL at 05:02

## 2018-11-10 RX ADMIN — HEPARIN SODIUM 5000 UNIT(S): 5000 INJECTION INTRAVENOUS; SUBCUTANEOUS at 05:02

## 2018-11-10 NOTE — PROGRESS NOTE ADULT - ASSESSMENT
73F pt with PMH Large bowel obstruction s/p descending loop colostomy s/p ex lap, CONNIE, colostomy take down, sigmoidectomy primary anastamosis 11/9    Regional  LR  no abx  Sparks  sips  SQH  kepra for previous meningioma resection  asa 81 every other day  no toradol  pain PRN

## 2018-11-11 LAB
ANION GAP SERPL CALC-SCNC: 16 MMOL/L — SIGNIFICANT CHANGE UP (ref 5–17)
BUN SERPL-MCNC: 8 MG/DL — SIGNIFICANT CHANGE UP (ref 7–23)
CALCIUM SERPL-MCNC: 8.5 MG/DL — SIGNIFICANT CHANGE UP (ref 8.4–10.5)
CHLORIDE SERPL-SCNC: 100 MMOL/L — SIGNIFICANT CHANGE UP (ref 96–108)
CO2 SERPL-SCNC: 22 MMOL/L — SIGNIFICANT CHANGE UP (ref 22–31)
CREAT SERPL-MCNC: 0.54 MG/DL — SIGNIFICANT CHANGE UP (ref 0.5–1.3)
GLUCOSE SERPL-MCNC: 100 MG/DL — HIGH (ref 70–99)
HCT VFR BLD CALC: 29 % — LOW (ref 34.5–45)
HGB BLD-MCNC: 9.6 G/DL — LOW (ref 11.5–15.5)
MAGNESIUM SERPL-MCNC: 2 MG/DL — SIGNIFICANT CHANGE UP (ref 1.6–2.6)
MCHC RBC-ENTMCNC: 30 PG — SIGNIFICANT CHANGE UP (ref 27–34)
MCHC RBC-ENTMCNC: 33.1 G/DL — SIGNIFICANT CHANGE UP (ref 32–36)
MCV RBC AUTO: 90.6 FL — SIGNIFICANT CHANGE UP (ref 80–100)
PHOSPHATE SERPL-MCNC: 2.1 MG/DL — LOW (ref 2.5–4.5)
PLATELET # BLD AUTO: 179 K/UL — SIGNIFICANT CHANGE UP (ref 150–400)
POTASSIUM SERPL-MCNC: 4 MMOL/L — SIGNIFICANT CHANGE UP (ref 3.5–5.3)
POTASSIUM SERPL-SCNC: 4 MMOL/L — SIGNIFICANT CHANGE UP (ref 3.5–5.3)
RBC # BLD: 3.2 M/UL — LOW (ref 3.8–5.2)
RBC # FLD: 14 % — SIGNIFICANT CHANGE UP (ref 10.3–16.9)
SODIUM SERPL-SCNC: 138 MMOL/L — SIGNIFICANT CHANGE UP (ref 135–145)
WBC # BLD: 7.4 K/UL — SIGNIFICANT CHANGE UP (ref 3.8–10.5)
WBC # FLD AUTO: 7.4 K/UL — SIGNIFICANT CHANGE UP (ref 3.8–10.5)

## 2018-11-11 RX ORDER — ACETAMINOPHEN 500 MG
650 TABLET ORAL ONCE
Qty: 0 | Refills: 0 | Status: COMPLETED | OUTPATIENT
Start: 2018-11-11 | End: 2018-11-11

## 2018-11-11 RX ORDER — SODIUM,POTASSIUM PHOSPHATES 278-250MG
1 POWDER IN PACKET (EA) ORAL ONCE
Qty: 0 | Refills: 0 | Status: COMPLETED | OUTPATIENT
Start: 2018-11-11 | End: 2018-11-11

## 2018-11-11 RX ADMIN — HYDROMORPHONE HYDROCHLORIDE 0.3 MILLIGRAM(S): 2 INJECTION INTRAMUSCULAR; INTRAVENOUS; SUBCUTANEOUS at 05:20

## 2018-11-11 RX ADMIN — HEPARIN SODIUM 5000 UNIT(S): 5000 INJECTION INTRAVENOUS; SUBCUTANEOUS at 21:11

## 2018-11-11 RX ADMIN — HEPARIN SODIUM 5000 UNIT(S): 5000 INJECTION INTRAVENOUS; SUBCUTANEOUS at 15:27

## 2018-11-11 RX ADMIN — HYDROMORPHONE HYDROCHLORIDE 0.3 MILLIGRAM(S): 2 INJECTION INTRAMUSCULAR; INTRAVENOUS; SUBCUTANEOUS at 18:57

## 2018-11-11 RX ADMIN — HYDROMORPHONE HYDROCHLORIDE 0.3 MILLIGRAM(S): 2 INJECTION INTRAMUSCULAR; INTRAVENOUS; SUBCUTANEOUS at 10:47

## 2018-11-11 RX ADMIN — HYDROMORPHONE HYDROCHLORIDE 0.3 MILLIGRAM(S): 2 INJECTION INTRAMUSCULAR; INTRAVENOUS; SUBCUTANEOUS at 19:12

## 2018-11-11 RX ADMIN — LEVETIRACETAM 420 MILLIGRAM(S): 250 TABLET, FILM COATED ORAL at 05:20

## 2018-11-11 RX ADMIN — HYDROMORPHONE HYDROCHLORIDE 0.3 MILLIGRAM(S): 2 INJECTION INTRAMUSCULAR; INTRAVENOUS; SUBCUTANEOUS at 06:08

## 2018-11-11 RX ADMIN — HYDROMORPHONE HYDROCHLORIDE 0.3 MILLIGRAM(S): 2 INJECTION INTRAMUSCULAR; INTRAVENOUS; SUBCUTANEOUS at 10:32

## 2018-11-11 RX ADMIN — HEPARIN SODIUM 5000 UNIT(S): 5000 INJECTION INTRAVENOUS; SUBCUTANEOUS at 05:19

## 2018-11-11 RX ADMIN — LEVETIRACETAM 420 MILLIGRAM(S): 250 TABLET, FILM COATED ORAL at 17:43

## 2018-11-11 RX ADMIN — Medication 650 MILLIGRAM(S): at 18:57

## 2018-11-11 RX ADMIN — Medication 650 MILLIGRAM(S): at 19:57

## 2018-11-11 RX ADMIN — Medication 1 PACKET(S): at 10:33

## 2018-11-11 NOTE — PROGRESS NOTE ADULT - ASSESSMENT
A/P: 73F pt with PMH Large bowel obstruction s/p descending loop colostomy s/p ex lap, CONNIE, colostomy take down, sigmoidectomy primary anastamosis 11/9    Regional  LR  no abx  Sparks  sips  SQH  kepra for previous meningioma resection  asa 81 every other day  no toradol  pain PRN

## 2018-11-12 LAB
ANION GAP SERPL CALC-SCNC: 18 MMOL/L — HIGH (ref 5–17)
BUN SERPL-MCNC: 6 MG/DL — LOW (ref 7–23)
CALCIUM SERPL-MCNC: 8.9 MG/DL — SIGNIFICANT CHANGE UP (ref 8.4–10.5)
CHLORIDE SERPL-SCNC: 97 MMOL/L — SIGNIFICANT CHANGE UP (ref 96–108)
CO2 SERPL-SCNC: 20 MMOL/L — LOW (ref 22–31)
CREAT SERPL-MCNC: 0.46 MG/DL — LOW (ref 0.5–1.3)
GLUCOSE SERPL-MCNC: 119 MG/DL — HIGH (ref 70–99)
HCT VFR BLD CALC: 30.7 % — LOW (ref 34.5–45)
HGB BLD-MCNC: 10.3 G/DL — LOW (ref 11.5–15.5)
MAGNESIUM SERPL-MCNC: 1.9 MG/DL — SIGNIFICANT CHANGE UP (ref 1.6–2.6)
MCHC RBC-ENTMCNC: 30.1 PG — SIGNIFICANT CHANGE UP (ref 27–34)
MCHC RBC-ENTMCNC: 33.6 G/DL — SIGNIFICANT CHANGE UP (ref 32–36)
MCV RBC AUTO: 89.8 FL — SIGNIFICANT CHANGE UP (ref 80–100)
PHOSPHATE SERPL-MCNC: 2.4 MG/DL — LOW (ref 2.5–4.5)
PLATELET # BLD AUTO: 219 K/UL — SIGNIFICANT CHANGE UP (ref 150–400)
POTASSIUM SERPL-MCNC: 4 MMOL/L — SIGNIFICANT CHANGE UP (ref 3.5–5.3)
POTASSIUM SERPL-SCNC: 4 MMOL/L — SIGNIFICANT CHANGE UP (ref 3.5–5.3)
RBC # BLD: 3.42 M/UL — LOW (ref 3.8–5.2)
RBC # FLD: 13.9 % — SIGNIFICANT CHANGE UP (ref 10.3–16.9)
SODIUM SERPL-SCNC: 135 MMOL/L — SIGNIFICANT CHANGE UP (ref 135–145)
WBC # BLD: 7.2 K/UL — SIGNIFICANT CHANGE UP (ref 3.8–10.5)
WBC # FLD AUTO: 7.2 K/UL — SIGNIFICANT CHANGE UP (ref 3.8–10.5)

## 2018-11-12 PROCEDURE — 99232 SBSQ HOSP IP/OBS MODERATE 35: CPT | Mod: GC

## 2018-11-12 RX ORDER — KETOROLAC TROMETHAMINE 30 MG/ML
15 SYRINGE (ML) INJECTION EVERY 6 HOURS
Qty: 0 | Refills: 0 | Status: DISCONTINUED | OUTPATIENT
Start: 2018-11-12 | End: 2018-11-15

## 2018-11-12 RX ORDER — MAGNESIUM SULFATE 500 MG/ML
2 VIAL (ML) INJECTION ONCE
Qty: 0 | Refills: 0 | Status: COMPLETED | OUTPATIENT
Start: 2018-11-12 | End: 2018-11-12

## 2018-11-12 RX ORDER — PANTOPRAZOLE SODIUM 20 MG/1
40 TABLET, DELAYED RELEASE ORAL DAILY
Qty: 0 | Refills: 0 | Status: DISCONTINUED | OUTPATIENT
Start: 2018-11-12 | End: 2018-11-16

## 2018-11-12 RX ORDER — DEXTROSE MONOHYDRATE, SODIUM CHLORIDE, AND POTASSIUM CHLORIDE 50; .745; 4.5 G/1000ML; G/1000ML; G/1000ML
1000 INJECTION, SOLUTION INTRAVENOUS
Qty: 0 | Refills: 0 | Status: DISCONTINUED | OUTPATIENT
Start: 2018-11-12 | End: 2018-11-17

## 2018-11-12 RX ADMIN — HYDROMORPHONE HYDROCHLORIDE 0.3 MILLIGRAM(S): 2 INJECTION INTRAMUSCULAR; INTRAVENOUS; SUBCUTANEOUS at 03:22

## 2018-11-12 RX ADMIN — HEPARIN SODIUM 5000 UNIT(S): 5000 INJECTION INTRAVENOUS; SUBCUTANEOUS at 13:03

## 2018-11-12 RX ADMIN — SODIUM CHLORIDE 100 MILLILITER(S): 9 INJECTION, SOLUTION INTRAVENOUS at 06:24

## 2018-11-12 RX ADMIN — Medication 15 MILLIGRAM(S): at 13:13

## 2018-11-12 RX ADMIN — LEVETIRACETAM 420 MILLIGRAM(S): 250 TABLET, FILM COATED ORAL at 18:54

## 2018-11-12 RX ADMIN — Medication 15 MILLIGRAM(S): at 23:33

## 2018-11-12 RX ADMIN — Medication 15 MILLIGRAM(S): at 09:25

## 2018-11-12 RX ADMIN — Medication 15 MILLIGRAM(S): at 20:02

## 2018-11-12 RX ADMIN — Medication 81 MILLIGRAM(S): at 11:40

## 2018-11-12 RX ADMIN — HEPARIN SODIUM 5000 UNIT(S): 5000 INJECTION INTRAVENOUS; SUBCUTANEOUS at 23:33

## 2018-11-12 RX ADMIN — HYDROMORPHONE HYDROCHLORIDE 0.3 MILLIGRAM(S): 2 INJECTION INTRAMUSCULAR; INTRAVENOUS; SUBCUTANEOUS at 04:34

## 2018-11-12 RX ADMIN — Medication 15 MILLIGRAM(S): at 08:59

## 2018-11-12 RX ADMIN — HEPARIN SODIUM 5000 UNIT(S): 5000 INJECTION INTRAVENOUS; SUBCUTANEOUS at 06:24

## 2018-11-12 RX ADMIN — Medication 50 GRAM(S): at 10:26

## 2018-11-12 RX ADMIN — Medication 15 MILLIGRAM(S): at 19:15

## 2018-11-12 RX ADMIN — DEXTROSE MONOHYDRATE, SODIUM CHLORIDE, AND POTASSIUM CHLORIDE 70 MILLILITER(S): 50; .745; 4.5 INJECTION, SOLUTION INTRAVENOUS at 10:26

## 2018-11-12 RX ADMIN — LEVETIRACETAM 420 MILLIGRAM(S): 250 TABLET, FILM COATED ORAL at 06:24

## 2018-11-12 RX ADMIN — PANTOPRAZOLE SODIUM 40 MILLIGRAM(S): 20 TABLET, DELAYED RELEASE ORAL at 11:40

## 2018-11-12 RX ADMIN — Medication 15 MILLIGRAM(S): at 13:03

## 2018-11-12 NOTE — PROGRESS NOTE ADULT - ASSESSMENT
A/P: 73F pt with PMH Large bowel obstruction s/p descending loop colostomy s/p ex lap, CONNIE, colostomy take down, sigmoidectomy primary anastamosis 11/9. Awaiting return of bowel function to progress diet.    Regional  LR  no abx  Voids  sips  SQH  kepra for previous meningioma resection  asa 81 every other day  Toradol  pain PRN  PPI for heartburn

## 2018-11-13 ENCOUNTER — TRANSCRIPTION ENCOUNTER (OUTPATIENT)
Age: 73
End: 2018-11-13

## 2018-11-13 DIAGNOSIS — Z87.09 PERSONAL HISTORY OF OTHER DISEASES OF THE RESPIRATORY SYSTEM: ICD-10-CM

## 2018-11-13 DIAGNOSIS — A69.20 LYME DISEASE, UNSPECIFIED: ICD-10-CM

## 2018-11-13 DIAGNOSIS — D32.9 BENIGN NEOPLASM OF MENINGES, UNSPECIFIED: ICD-10-CM

## 2018-11-13 DIAGNOSIS — R03.0 ELEVATED BLOOD-PRESSURE READING, WITHOUT DIAGNOSIS OF HYPERTENSION: ICD-10-CM

## 2018-11-13 DIAGNOSIS — E78.00 PURE HYPERCHOLESTEROLEMIA, UNSPECIFIED: ICD-10-CM

## 2018-11-13 DIAGNOSIS — Z98.890 OTHER SPECIFIED POSTPROCEDURAL STATES: ICD-10-CM

## 2018-11-13 DIAGNOSIS — I25.10 ATHEROSCLEROTIC HEART DISEASE OF NATIVE CORONARY ARTERY WITHOUT ANGINA PECTORIS: ICD-10-CM

## 2018-11-13 DIAGNOSIS — D50.0 IRON DEFICIENCY ANEMIA SECONDARY TO BLOOD LOSS (CHRONIC): ICD-10-CM

## 2018-11-13 LAB
ANION GAP SERPL CALC-SCNC: 16 MMOL/L — SIGNIFICANT CHANGE UP (ref 5–17)
BUN SERPL-MCNC: 5 MG/DL — LOW (ref 7–23)
CALCIUM SERPL-MCNC: 8.9 MG/DL — SIGNIFICANT CHANGE UP (ref 8.4–10.5)
CHLORIDE SERPL-SCNC: 100 MMOL/L — SIGNIFICANT CHANGE UP (ref 96–108)
CO2 SERPL-SCNC: 22 MMOL/L — SIGNIFICANT CHANGE UP (ref 22–31)
CREAT SERPL-MCNC: 0.51 MG/DL — SIGNIFICANT CHANGE UP (ref 0.5–1.3)
GLUCOSE SERPL-MCNC: 143 MG/DL — HIGH (ref 70–99)
HCT VFR BLD CALC: 27.8 % — LOW (ref 34.5–45)
HGB BLD-MCNC: 9.6 G/DL — LOW (ref 11.5–15.5)
MAGNESIUM SERPL-MCNC: 2.4 MG/DL — SIGNIFICANT CHANGE UP (ref 1.6–2.6)
MCHC RBC-ENTMCNC: 29.8 PG — SIGNIFICANT CHANGE UP (ref 27–34)
MCHC RBC-ENTMCNC: 34.5 G/DL — SIGNIFICANT CHANGE UP (ref 32–36)
MCV RBC AUTO: 86.3 FL — SIGNIFICANT CHANGE UP (ref 80–100)
PHOSPHATE SERPL-MCNC: 1.4 MG/DL — LOW (ref 2.5–4.5)
PLATELET # BLD AUTO: 268 K/UL — SIGNIFICANT CHANGE UP (ref 150–400)
POTASSIUM SERPL-MCNC: 3.6 MMOL/L — SIGNIFICANT CHANGE UP (ref 3.5–5.3)
POTASSIUM SERPL-SCNC: 3.6 MMOL/L — SIGNIFICANT CHANGE UP (ref 3.5–5.3)
RBC # BLD: 3.22 M/UL — LOW (ref 3.8–5.2)
RBC # FLD: 14.2 % — SIGNIFICANT CHANGE UP (ref 10.3–16.9)
SODIUM SERPL-SCNC: 138 MMOL/L — SIGNIFICANT CHANGE UP (ref 135–145)
WBC # BLD: 6.4 K/UL — SIGNIFICANT CHANGE UP (ref 3.8–10.5)
WBC # FLD AUTO: 6.4 K/UL — SIGNIFICANT CHANGE UP (ref 3.8–10.5)

## 2018-11-13 PROCEDURE — 99232 SBSQ HOSP IP/OBS MODERATE 35: CPT | Mod: GC

## 2018-11-13 RX ORDER — POTASSIUM PHOSPHATE, MONOBASIC POTASSIUM PHOSPHATE, DIBASIC 236; 224 MG/ML; MG/ML
30 INJECTION, SOLUTION INTRAVENOUS ONCE
Qty: 0 | Refills: 0 | Status: DISCONTINUED | OUTPATIENT
Start: 2018-11-13 | End: 2018-11-14

## 2018-11-13 RX ORDER — PANTOPRAZOLE SODIUM 20 MG/1
40 TABLET, DELAYED RELEASE ORAL ONCE
Qty: 0 | Refills: 0 | Status: COMPLETED | OUTPATIENT
Start: 2018-11-13 | End: 2018-11-13

## 2018-11-13 RX ORDER — MAGNESIUM SULFATE 500 MG/ML
1 VIAL (ML) INJECTION ONCE
Qty: 0 | Refills: 0 | Status: COMPLETED | OUTPATIENT
Start: 2018-11-13 | End: 2018-11-13

## 2018-11-13 RX ORDER — POTASSIUM CHLORIDE 20 MEQ
10 PACKET (EA) ORAL ONCE
Qty: 0 | Refills: 0 | Status: COMPLETED | OUTPATIENT
Start: 2018-11-13 | End: 2018-11-13

## 2018-11-13 RX ORDER — AMLODIPINE BESYLATE 2.5 MG/1
2.5 TABLET ORAL DAILY
Qty: 0 | Refills: 0 | Status: DISCONTINUED | OUTPATIENT
Start: 2018-11-13 | End: 2018-11-17

## 2018-11-13 RX ADMIN — AMLODIPINE BESYLATE 2.5 MILLIGRAM(S): 2.5 TABLET ORAL at 11:38

## 2018-11-13 RX ADMIN — Medication 15 MILLIGRAM(S): at 17:57

## 2018-11-13 RX ADMIN — HEPARIN SODIUM 5000 UNIT(S): 5000 INJECTION INTRAVENOUS; SUBCUTANEOUS at 14:46

## 2018-11-13 RX ADMIN — LEVETIRACETAM 420 MILLIGRAM(S): 250 TABLET, FILM COATED ORAL at 05:56

## 2018-11-13 RX ADMIN — Medication 100 GRAM(S): at 11:37

## 2018-11-13 RX ADMIN — Medication 15 MILLIGRAM(S): at 11:38

## 2018-11-13 RX ADMIN — HEPARIN SODIUM 5000 UNIT(S): 5000 INJECTION INTRAVENOUS; SUBCUTANEOUS at 05:56

## 2018-11-13 RX ADMIN — HEPARIN SODIUM 5000 UNIT(S): 5000 INJECTION INTRAVENOUS; SUBCUTANEOUS at 21:14

## 2018-11-13 RX ADMIN — Medication 15 MILLIGRAM(S): at 05:56

## 2018-11-13 RX ADMIN — Medication 15 MILLIGRAM(S): at 00:11

## 2018-11-13 RX ADMIN — PANTOPRAZOLE SODIUM 40 MILLIGRAM(S): 20 TABLET, DELAYED RELEASE ORAL at 11:38

## 2018-11-13 RX ADMIN — Medication 15 MILLIGRAM(S): at 06:06

## 2018-11-13 RX ADMIN — DEXTROSE MONOHYDRATE, SODIUM CHLORIDE, AND POTASSIUM CHLORIDE 70 MILLILITER(S): 50; .745; 4.5 INJECTION, SOLUTION INTRAVENOUS at 14:46

## 2018-11-13 RX ADMIN — Medication 10 MILLIEQUIVALENT(S): at 17:57

## 2018-11-13 RX ADMIN — PANTOPRAZOLE SODIUM 40 MILLIGRAM(S): 20 TABLET, DELAYED RELEASE ORAL at 21:14

## 2018-11-13 RX ADMIN — Medication 15 MILLIGRAM(S): at 12:38

## 2018-11-13 RX ADMIN — LEVETIRACETAM 420 MILLIGRAM(S): 250 TABLET, FILM COATED ORAL at 17:57

## 2018-11-13 NOTE — DISCHARGE NOTE ADULT - MEDICATION SUMMARY - MEDICATIONS TO TAKE
I will START or STAY ON the medications listed below when I get home from the hospital:    aspirin 81 mg oral tablet  -- 1 tab(s) by mouth once a day  -- Indication: For Home    levETIRAcetam 500 mg oral tablet  -- 1 tab(s) by mouth once a day  -- Indication: For Home    atorvastatin  -- 40 milligram(s) by mouth once (at bedtime)  -- Indication: For Home

## 2018-11-13 NOTE — DISCHARGE NOTE ADULT - PATIENT PORTAL LINK FT
You can access the [facility] Patient Portal, offered by [facility], by registering with the following website: [email or web address]

## 2018-11-13 NOTE — DISCHARGE NOTE ADULT - HOSPITAL COURSE
73F pt with PMH Large bowel obstruction s/p descending loop colostomy admitted to [facility] on 11/9/2018 for elective ex lap, CONNIE, colostomy take down, sigmoidectomy primary anastomosis.  On 11/10, the patient had no nausea/vomiting, no bowel function, and restarted ASA every other day.  On 11/11, the patient tolerated sips of water, ambulated, and voided after Sparks catheter removal.  The patient had a temperature of 100.5 overnight, for which Tylenol was given.  On 11/12, the patient's pain improved with toradol and a PPI was started for GERD symptoms. 73F pt with PMH Large bowel obstruction s/p descending loop colostomy admitted to [facility] on 11/9/2018 for elective ex lap, CONNIE, colostomy take down, and sigmoidectomy with primary anastomosis.  Aspirin was restarted on POD1. Patient's post-operative course was uncomplicated. Diet was advanced as tolerated and pain was well controlled on medication. On day of discharge, pt deemed stable and ready to return home with plan to follow up as an outpatient.

## 2018-11-13 NOTE — DISCHARGE NOTE ADULT - CARE PLAN
Principal Discharge DX:	Postoperative state  Goal:	Recovery.  Assessment and plan of treatment:	Please follow up with Dr. Green on Monday 11/19 or Tuesday 11/20 to have your staples removed. Call his office to schedule an appointment: [phone].     General Discharge Instructions:  Please resume all regular home medications unless specifically advised not to take a particular medication. Also, please take any new medications as prescribed.  Please get plenty of rest, continue to ambulate several times per day, and drink adequate amounts of fluids. Avoid lifting weights greater than 5-10 lbs until you follow-up with your surgeon, who will instruct you further regarding activity restrictions.  Avoid driving or operating heavy machinery while taking pain medications.  Please follow-up with your surgeon and Primary Care Provider (PCP) as advised.  Incision Care:  *Please call your doctor or nurse practitioner if you have increased pain, swelling, redness, or drainage from the incision site.  *Avoid swimming and baths until your follow-up appointment.  *You may shower, and wash surgical incisions with a mild soap and warm water. Gently pat the area dry.  *If you have staples, they will be removed at your follow-up appointment.  *If you have steri-strips, they will fall off on their own. Please remove any remaining strips 7-10 days after surgery.

## 2018-11-13 NOTE — DISCHARGE NOTE ADULT - PLAN OF CARE
Recovery. Please follow up with Dr. Green on Monday 11/19 or Tuesday 11/20 to have your staples removed. Call his office to schedule an appointment: [phone].     General Discharge Instructions:  Please resume all regular home medications unless specifically advised not to take a particular medication. Also, please take any new medications as prescribed.  Please get plenty of rest, continue to ambulate several times per day, and drink adequate amounts of fluids. Avoid lifting weights greater than 5-10 lbs until you follow-up with your surgeon, who will instruct you further regarding activity restrictions.  Avoid driving or operating heavy machinery while taking pain medications.  Please follow-up with your surgeon and Primary Care Provider (PCP) as advised.  Incision Care:  *Please call your doctor or nurse practitioner if you have increased pain, swelling, redness, or drainage from the incision site.  *Avoid swimming and baths until your follow-up appointment.  *You may shower, and wash surgical incisions with a mild soap and warm water. Gently pat the area dry.  *If you have staples, they will be removed at your follow-up appointment.  *If you have steri-strips, they will fall off on their own. Please remove any remaining strips 7-10 days after surgery.

## 2018-11-13 NOTE — DISCHARGE NOTE ADULT - CARE PROVIDER_API CALL
Elke Green), ColonRectal Surgery; Surgery  [address] [place] [address]  [address] [place] [address]  Phone: [phone]  Fax: [phone]

## 2018-11-13 NOTE — PROGRESS NOTE ADULT - ASSESSMENT
A/P: 73F pt with PMH Large bowel obstruction s/p descending loop colostomy s/p ex lap, CONNIE, colostomy take down, sigmoidectomy primary anastomosis 11/9. Continuing to await return of bowel function - will progress to CLD once passing flatus.    Regional  LR  No abx  Voids  Sips  SQH  Levetiracetam for previous meningioma resection  ASA 81 every other day  Toradol until 11/15  pain PRN

## 2018-11-14 LAB
ANION GAP SERPL CALC-SCNC: 11 MMOL/L — SIGNIFICANT CHANGE UP (ref 5–17)
BUN SERPL-MCNC: 4 MG/DL — LOW (ref 7–23)
CALCIUM SERPL-MCNC: 9 MG/DL — SIGNIFICANT CHANGE UP (ref 8.4–10.5)
CHLORIDE SERPL-SCNC: 109 MMOL/L — HIGH (ref 96–108)
CO2 SERPL-SCNC: 21 MMOL/L — LOW (ref 22–31)
CREAT SERPL-MCNC: 0.56 MG/DL — SIGNIFICANT CHANGE UP (ref 0.5–1.3)
GLUCOSE SERPL-MCNC: 139 MG/DL — HIGH (ref 70–99)
HCT VFR BLD CALC: 25.6 % — LOW (ref 34.5–45)
HGB BLD-MCNC: 8.8 G/DL — LOW (ref 11.5–15.5)
MAGNESIUM SERPL-MCNC: 2 MG/DL — SIGNIFICANT CHANGE UP (ref 1.6–2.6)
MCHC RBC-ENTMCNC: 30 PG — SIGNIFICANT CHANGE UP (ref 27–34)
MCHC RBC-ENTMCNC: 34.4 G/DL — SIGNIFICANT CHANGE UP (ref 32–36)
MCV RBC AUTO: 87.4 FL — SIGNIFICANT CHANGE UP (ref 80–100)
PHOSPHATE SERPL-MCNC: 2.2 MG/DL — LOW (ref 2.5–4.5)
PLATELET # BLD AUTO: 250 K/UL — SIGNIFICANT CHANGE UP (ref 150–400)
POTASSIUM SERPL-MCNC: 4 MMOL/L — SIGNIFICANT CHANGE UP (ref 3.5–5.3)
POTASSIUM SERPL-SCNC: 4 MMOL/L — SIGNIFICANT CHANGE UP (ref 3.5–5.3)
RBC # BLD: 2.93 M/UL — LOW (ref 3.8–5.2)
RBC # FLD: 14.6 % — SIGNIFICANT CHANGE UP (ref 10.3–16.9)
SODIUM SERPL-SCNC: 141 MMOL/L — SIGNIFICANT CHANGE UP (ref 135–145)
WBC # BLD: 4.6 K/UL — SIGNIFICANT CHANGE UP (ref 3.8–10.5)
WBC # FLD AUTO: 4.6 K/UL — SIGNIFICANT CHANGE UP (ref 3.8–10.5)

## 2018-11-14 PROCEDURE — 99232 SBSQ HOSP IP/OBS MODERATE 35: CPT | Mod: GC

## 2018-11-14 RX ORDER — POTASSIUM PHOSPHATE, MONOBASIC POTASSIUM PHOSPHATE, DIBASIC 236; 224 MG/ML; MG/ML
15 INJECTION, SOLUTION INTRAVENOUS ONCE
Qty: 0 | Refills: 0 | Status: COMPLETED | OUTPATIENT
Start: 2018-11-14 | End: 2018-11-14

## 2018-11-14 RX ORDER — MAGNESIUM HYDROXIDE 400 MG/1
15 TABLET, CHEWABLE ORAL ONCE
Qty: 0 | Refills: 0 | Status: COMPLETED | OUTPATIENT
Start: 2018-11-14 | End: 2018-11-14

## 2018-11-14 RX ADMIN — PANTOPRAZOLE SODIUM 40 MILLIGRAM(S): 20 TABLET, DELAYED RELEASE ORAL at 11:23

## 2018-11-14 RX ADMIN — AMLODIPINE BESYLATE 2.5 MILLIGRAM(S): 2.5 TABLET ORAL at 05:20

## 2018-11-14 RX ADMIN — Medication 15 MILLIGRAM(S): at 00:54

## 2018-11-14 RX ADMIN — Medication 15 MILLIGRAM(S): at 00:22

## 2018-11-14 RX ADMIN — Medication 15 MILLIGRAM(S): at 17:05

## 2018-11-14 RX ADMIN — LEVETIRACETAM 420 MILLIGRAM(S): 250 TABLET, FILM COATED ORAL at 05:22

## 2018-11-14 RX ADMIN — POTASSIUM PHOSPHATE, MONOBASIC POTASSIUM PHOSPHATE, DIBASIC 62.5 MILLIMOLE(S): 236; 224 INJECTION, SOLUTION INTRAVENOUS at 17:05

## 2018-11-14 RX ADMIN — Medication 15 MILLIGRAM(S): at 05:55

## 2018-11-14 RX ADMIN — Medication 15 MILLIGRAM(S): at 18:26

## 2018-11-14 RX ADMIN — HEPARIN SODIUM 5000 UNIT(S): 5000 INJECTION INTRAVENOUS; SUBCUTANEOUS at 21:16

## 2018-11-14 RX ADMIN — MAGNESIUM HYDROXIDE 15 MILLILITER(S): 400 TABLET, CHEWABLE ORAL at 18:57

## 2018-11-14 RX ADMIN — Medication 15 MILLIGRAM(S): at 05:20

## 2018-11-14 RX ADMIN — HEPARIN SODIUM 5000 UNIT(S): 5000 INJECTION INTRAVENOUS; SUBCUTANEOUS at 05:20

## 2018-11-14 RX ADMIN — DEXTROSE MONOHYDRATE, SODIUM CHLORIDE, AND POTASSIUM CHLORIDE 70 MILLILITER(S): 50; .745; 4.5 INJECTION, SOLUTION INTRAVENOUS at 06:40

## 2018-11-14 RX ADMIN — Medication 15 MILLIGRAM(S): at 11:23

## 2018-11-14 RX ADMIN — HEPARIN SODIUM 5000 UNIT(S): 5000 INJECTION INTRAVENOUS; SUBCUTANEOUS at 13:14

## 2018-11-14 RX ADMIN — Medication 15 MILLIGRAM(S): at 12:29

## 2018-11-14 RX ADMIN — LEVETIRACETAM 420 MILLIGRAM(S): 250 TABLET, FILM COATED ORAL at 17:37

## 2018-11-14 NOTE — DIETITIAN INITIAL EVALUATION ADULT. - OTHER INFO
73 y.o F from home with PMHx of HLD, CAD s/p stent on ASA, Meningioma s/p resection 8/2017, Endometriosis s/p TAB SBO, h/o colonic volvulus s/p ex lap, colopexy and appendectomy. Pt p/w elective reversal of colostomy, s/p ex lap, CONNIE, colostomy take down and sigmoidectomy primary anastomosis 11/9. Pt cooks at home, reports eating well, follows regular diet, good appetite, NKFA. Pt denies any recent weight loss prior to August 2018. Pt is currently on NPO with ice chips/sips of water at this time 2/2 post-op ileus. Denies N/V/D/C, pain controlled per regimen. Pt reports no flatus this AM, no BM thus far. Zofran PRN. +Ambulating, OOB to chair. Skin intact. Discussed with team to possibly start CLD with prokinetic agents to possibly promote GI functions. RD will f/u per high risk protocol.

## 2018-11-14 NOTE — DIETITIAN INITIAL EVALUATION ADULT. - ENERGY NEEDS
Height: 5'6" Weight: 131.8lbs,  lbs+/-10%, %%, BMI 21.3,  ABW used to calculate EER as per pt's current body weight is within % IBW  Estimated nutrient needs based on [facility] SOC for maintenance in older adults adjusted for post-op

## 2018-11-14 NOTE — PROGRESS NOTE ADULT - ASSESSMENT
73F pt with PMH Large bowel obstruction s/p descending loop colostomy s/p ex lap, CONNIE, colostomy take down, sigmoidectomy primary anastomosis 11/9    Regional  IVF  Sips  SQH  Levetiracetam for previous meningioma resection  ASA 81 every other day  Toradol until 11/15  pain PRN

## 2018-11-15 LAB
ANION GAP SERPL CALC-SCNC: 14 MMOL/L — SIGNIFICANT CHANGE UP (ref 5–17)
BUN SERPL-MCNC: 4 MG/DL — LOW (ref 7–23)
CALCIUM SERPL-MCNC: 9.3 MG/DL — SIGNIFICANT CHANGE UP (ref 8.4–10.5)
CHLORIDE SERPL-SCNC: 103 MMOL/L — SIGNIFICANT CHANGE UP (ref 96–108)
CO2 SERPL-SCNC: 22 MMOL/L — SIGNIFICANT CHANGE UP (ref 22–31)
CREAT SERPL-MCNC: 0.54 MG/DL — SIGNIFICANT CHANGE UP (ref 0.5–1.3)
GLUCOSE SERPL-MCNC: 135 MG/DL — HIGH (ref 70–99)
HCT VFR BLD CALC: 27.2 % — LOW (ref 34.5–45)
HGB BLD-MCNC: 9.2 G/DL — LOW (ref 11.5–15.5)
MAGNESIUM SERPL-MCNC: 2 MG/DL — SIGNIFICANT CHANGE UP (ref 1.6–2.6)
MCHC RBC-ENTMCNC: 29.7 PG — SIGNIFICANT CHANGE UP (ref 27–34)
MCHC RBC-ENTMCNC: 33.8 G/DL — SIGNIFICANT CHANGE UP (ref 32–36)
MCV RBC AUTO: 87.7 FL — SIGNIFICANT CHANGE UP (ref 80–100)
PHOSPHATE SERPL-MCNC: 3.7 MG/DL — SIGNIFICANT CHANGE UP (ref 2.5–4.5)
PLATELET # BLD AUTO: 296 K/UL — SIGNIFICANT CHANGE UP (ref 150–400)
POTASSIUM SERPL-MCNC: 3.5 MMOL/L — SIGNIFICANT CHANGE UP (ref 3.5–5.3)
POTASSIUM SERPL-SCNC: 3.5 MMOL/L — SIGNIFICANT CHANGE UP (ref 3.5–5.3)
RBC # BLD: 3.1 M/UL — LOW (ref 3.8–5.2)
RBC # FLD: 14.9 % — SIGNIFICANT CHANGE UP (ref 10.3–16.9)
SODIUM SERPL-SCNC: 139 MMOL/L — SIGNIFICANT CHANGE UP (ref 135–145)
SURGICAL PATHOLOGY STUDY: SIGNIFICANT CHANGE UP
WBC # BLD: 6.1 K/UL — SIGNIFICANT CHANGE UP (ref 3.8–10.5)
WBC # FLD AUTO: 6.1 K/UL — SIGNIFICANT CHANGE UP (ref 3.8–10.5)

## 2018-11-15 PROCEDURE — 99232 SBSQ HOSP IP/OBS MODERATE 35: CPT | Mod: GC

## 2018-11-15 RX ORDER — ACETAMINOPHEN 500 MG
650 TABLET ORAL EVERY 6 HOURS
Qty: 0 | Refills: 0 | Status: DISCONTINUED | OUTPATIENT
Start: 2018-11-15 | End: 2018-11-17

## 2018-11-15 RX ORDER — BENZOCAINE AND MENTHOL 5; 1 G/100ML; G/100ML
1 LIQUID ORAL
Qty: 0 | Refills: 0 | Status: DISCONTINUED | OUTPATIENT
Start: 2018-11-15 | End: 2018-11-17

## 2018-11-15 RX ORDER — POTASSIUM CHLORIDE 20 MEQ
20 PACKET (EA) ORAL
Qty: 0 | Refills: 0 | Status: COMPLETED | OUTPATIENT
Start: 2018-11-15 | End: 2018-11-15

## 2018-11-15 RX ORDER — SODIUM,POTASSIUM PHOSPHATES 278-250MG
1 POWDER IN PACKET (EA) ORAL EVERY 4 HOURS
Qty: 0 | Refills: 0 | Status: DISCONTINUED | OUTPATIENT
Start: 2018-11-15 | End: 2018-11-15

## 2018-11-15 RX ADMIN — Medication 20 MILLIEQUIVALENT(S): at 18:49

## 2018-11-15 RX ADMIN — Medication 650 MILLIGRAM(S): at 14:20

## 2018-11-15 RX ADMIN — BENZOCAINE AND MENTHOL 1 LOZENGE: 5; 1 LIQUID ORAL at 11:43

## 2018-11-15 RX ADMIN — Medication 15 MILLIGRAM(S): at 06:10

## 2018-11-15 RX ADMIN — Medication 15 MILLIGRAM(S): at 06:43

## 2018-11-15 RX ADMIN — PANTOPRAZOLE SODIUM 40 MILLIGRAM(S): 20 TABLET, DELAYED RELEASE ORAL at 11:43

## 2018-11-15 RX ADMIN — HEPARIN SODIUM 5000 UNIT(S): 5000 INJECTION INTRAVENOUS; SUBCUTANEOUS at 13:43

## 2018-11-15 RX ADMIN — AMLODIPINE BESYLATE 2.5 MILLIGRAM(S): 2.5 TABLET ORAL at 06:10

## 2018-11-15 RX ADMIN — LEVETIRACETAM 420 MILLIGRAM(S): 250 TABLET, FILM COATED ORAL at 06:09

## 2018-11-15 RX ADMIN — Medication 15 MILLIGRAM(S): at 00:15

## 2018-11-15 RX ADMIN — Medication 20 MILLIEQUIVALENT(S): at 13:41

## 2018-11-15 RX ADMIN — Medication 20 MILLIEQUIVALENT(S): at 16:26

## 2018-11-15 RX ADMIN — HEPARIN SODIUM 5000 UNIT(S): 5000 INJECTION INTRAVENOUS; SUBCUTANEOUS at 06:09

## 2018-11-15 RX ADMIN — Medication 650 MILLIGRAM(S): at 13:41

## 2018-11-15 RX ADMIN — HEPARIN SODIUM 5000 UNIT(S): 5000 INJECTION INTRAVENOUS; SUBCUTANEOUS at 22:51

## 2018-11-15 RX ADMIN — Medication 15 MILLIGRAM(S): at 00:48

## 2018-11-15 NOTE — PROGRESS NOTE ADULT - ASSESSMENT
A/P: 73F pt with PMH Large bowel obstruction s/p descending loop colostomy s/p ex lap, CONNIE, colostomy take down, sigmoidectomy primary anastomosis 11/9. Following tolerance of CLD.    Regional  LR  No abx  Voids  CLD  SQH  Levetiracetam for previous meningioma resection  ASA 81 every other day  Toradol until 11/15  pain PRN

## 2018-11-16 PROCEDURE — 99232 SBSQ HOSP IP/OBS MODERATE 35: CPT | Mod: GC

## 2018-11-16 RX ORDER — LEVETIRACETAM 250 MG/1
500 TABLET, FILM COATED ORAL DAILY
Qty: 0 | Refills: 0 | Status: DISCONTINUED | OUTPATIENT
Start: 2018-11-16 | End: 2018-11-17

## 2018-11-16 RX ORDER — PANTOPRAZOLE SODIUM 20 MG/1
40 TABLET, DELAYED RELEASE ORAL
Qty: 0 | Refills: 0 | Status: DISCONTINUED | OUTPATIENT
Start: 2018-11-16 | End: 2018-11-17

## 2018-11-16 RX ORDER — LEVETIRACETAM 250 MG/1
500 TABLET, FILM COATED ORAL EVERY 12 HOURS
Qty: 0 | Refills: 0 | Status: DISCONTINUED | OUTPATIENT
Start: 2018-11-16 | End: 2018-11-16

## 2018-11-16 RX ADMIN — HEPARIN SODIUM 5000 UNIT(S): 5000 INJECTION INTRAVENOUS; SUBCUTANEOUS at 21:45

## 2018-11-16 RX ADMIN — Medication 81 MILLIGRAM(S): at 11:53

## 2018-11-16 RX ADMIN — DEXTROSE MONOHYDRATE, SODIUM CHLORIDE, AND POTASSIUM CHLORIDE 40 MILLILITER(S): 50; .745; 4.5 INJECTION, SOLUTION INTRAVENOUS at 09:46

## 2018-11-16 RX ADMIN — AMLODIPINE BESYLATE 2.5 MILLIGRAM(S): 2.5 TABLET ORAL at 06:03

## 2018-11-16 RX ADMIN — LEVETIRACETAM 420 MILLIGRAM(S): 250 TABLET, FILM COATED ORAL at 06:03

## 2018-11-16 RX ADMIN — LEVETIRACETAM 500 MILLIGRAM(S): 250 TABLET, FILM COATED ORAL at 19:02

## 2018-11-16 RX ADMIN — BENZOCAINE AND MENTHOL 1 LOZENGE: 5; 1 LIQUID ORAL at 02:21

## 2018-11-16 RX ADMIN — HEPARIN SODIUM 5000 UNIT(S): 5000 INJECTION INTRAVENOUS; SUBCUTANEOUS at 06:03

## 2018-11-16 NOTE — PROGRESS NOTE ADULT - PROBLEM SELECTOR PLAN 5
Stable on followup as an outpatient

## 2018-11-16 NOTE — PROGRESS NOTE ADULT - PROBLEM SELECTOR PROBLEM 6
Elevated blood pressure reading

## 2018-11-16 NOTE — PROGRESS NOTE ADULT - PROBLEM SELECTOR PLAN 8
The patient is hemodynamically stable.  The pain is controlled.  Patient is on DVT prophylaxis.  Patient is using incentive spirometry.  Oxygen saturation is acceptable.  Advance diet as tolerated.  Advance activity as tolerated.  Monitor for ileus.  Patient is on Laxatives.  Surgical wound is stable.  No indication for monitor bed.

## 2018-11-16 NOTE — PROGRESS NOTE ADULT - ASSESSMENT
A/P: 73F pt with PMH Large bowel obstruction s/p descending loop colostomy s/p ex lap, CONNIE, colostomy take down, sigmoidectomy primary anastomosis 11/9    Regional  LR  No abx  Voids  CLD  SQH  Levetiracetam for previous meningioma resection  ASA 81 every other day  Toradol until 11/15  pain PRN A/P: 73F pt with PMH Large bowel obstruction s/p descending loop colostomy s/p ex lap, CONNIE, colostomy take down, sigmoidectomy primary anastomosis 11/9. Awaiting additional flatus and bowel movements.    Regional  LR  No abx  Voids  CLD  SQH  Levetiracetam for previous meningioma resection  ASA 81 every other day  Toradol until 11/15  pain PRN

## 2018-11-16 NOTE — PROGRESS NOTE ADULT - PROBLEM SELECTOR PLAN 7
Follow on hemoglobin and no indication for transfusion at this point

## 2018-11-16 NOTE — PROGRESS NOTE ADULT - PROBLEM SELECTOR PLAN 3
Hold on statin and at this point

## 2018-11-16 NOTE — PROGRESS NOTE ADULT - PROBLEM SELECTOR PLAN 1
Patient did not have the echocardiogram preoperatively. There is no clinical evidence neither of angina, CHF, nor arrhythmia.

## 2018-11-16 NOTE — PROGRESS NOTE ADULT - PROBLEM SELECTOR PLAN 2
Repeat chest x-ray was unremarkable.

## 2018-11-16 NOTE — PROGRESS NOTE ADULT - ATTENDING COMMENTS
No flatus.  Occasional cramps.  No N/V  AFVSS  Abd soft, mod distended, incisions clean    A/P: POD 5 sigmoid colectomy with colostomy reversal. Ileus  1. Minimize opioids  2. OOB, IS  3. Await flatus, then clear liquids  4. IV maintenance fluids.
Patient seen and examined with house-staff during bedside rounds.  Resident note read, including vitals, physical findings, laboratory data, and radiological reports.   Revisions included below.  Direct personal management at bed side and extensive interpretation of the data.  Plan was outlined and discussed in details with the housestaff.  Decision making of high complexity  Action taken for acute disease activity to reflect the level of care provided:  - medication reconciliation  - review laboratory data
Patient seen and examined with house-staff during bedside rounds.  Resident note read, including vitals, physical findings, laboratory data, and radiological reports.   Revisions included below.  Direct personal management at bed side and extensive interpretation of the data.  Plan was outlined and discussed in details with the housestaff.  Decision making of high complexity  Action taken for acute disease activity to reflect the level of care provided:  - medication reconciliation  - review laboratory data  Advance diet As per surgery The patient cleared for  discharge
Patient seen and examined with house-staff during bedside rounds.  Resident note read, including vitals, physical findings, laboratory data, and radiological reports.   Revisions included below.  Direct personal management at bed side and extensive interpretation of the data.  Plan was outlined and discussed in details with the housestaff.  Decision making of high complexity  Action taken for acute disease activity to reflect the level of care provided:  - medication reconciliation  - review laboratory data  She Tolerating liquids. We'll observe and advance diet as per surgery.

## 2018-11-16 NOTE — PROGRESS NOTE ADULT - PROBLEM SELECTOR PLAN 4
Was treated in the past

## 2018-11-16 NOTE — PROGRESS NOTE ADULT - PROBLEM SELECTOR PROBLEM 2
H/O pleural effusion

## 2018-11-16 NOTE — PROGRESS NOTE ADULT - PROBLEM SELECTOR PLAN 6
Followup on the blood pressure that could be related to distress. Head on antihypertensive medication
she is stable on amlodipine
she is stable on amlodipine
started low dose of amlodipine and will follow
BP is still elevated and will follow

## 2018-11-17 VITALS
RESPIRATION RATE: 16 BRPM | OXYGEN SATURATION: 97 % | DIASTOLIC BLOOD PRESSURE: 75 MMHG | HEART RATE: 88 BPM | SYSTOLIC BLOOD PRESSURE: 113 MMHG | TEMPERATURE: 98 F

## 2018-11-17 PROCEDURE — 86901 BLOOD TYPING SEROLOGIC RH(D): CPT

## 2018-11-17 PROCEDURE — 86900 BLOOD TYPING SEROLOGIC ABO: CPT

## 2018-11-17 PROCEDURE — 36415 COLL VENOUS BLD VENIPUNCTURE: CPT

## 2018-11-17 PROCEDURE — 80048 BASIC METABOLIC PNL TOTAL CA: CPT

## 2018-11-17 PROCEDURE — 83735 ASSAY OF MAGNESIUM: CPT

## 2018-11-17 PROCEDURE — 82962 GLUCOSE BLOOD TEST: CPT

## 2018-11-17 PROCEDURE — 85027 COMPLETE CBC AUTOMATED: CPT

## 2018-11-17 PROCEDURE — 88304 TISSUE EXAM BY PATHOLOGIST: CPT

## 2018-11-17 PROCEDURE — 85025 COMPLETE CBC W/AUTO DIFF WBC: CPT

## 2018-11-17 PROCEDURE — C9399: CPT

## 2018-11-17 PROCEDURE — 84100 ASSAY OF PHOSPHORUS: CPT

## 2018-11-17 PROCEDURE — 86850 RBC ANTIBODY SCREEN: CPT

## 2018-11-17 PROCEDURE — 88307 TISSUE EXAM BY PATHOLOGIST: CPT

## 2018-11-17 PROCEDURE — P9045: CPT

## 2018-11-17 RX ORDER — DOCUSATE SODIUM 100 MG
1 CAPSULE ORAL
Qty: 12 | Refills: 0
Start: 2018-11-17 | End: 2018-11-22

## 2018-11-17 RX ADMIN — PANTOPRAZOLE SODIUM 40 MILLIGRAM(S): 20 TABLET, DELAYED RELEASE ORAL at 06:25

## 2018-11-17 NOTE — PROGRESS NOTE ADULT - SUBJECTIVE AND OBJECTIVE BOX
Attending Surgeon Progress Note (Covering for Dr. Green/Dr. Mueller)/david    STATUS POST:  sigmoid colectomy  POST OPERATIVE DAY #: 2    SUBJECTIVE:  Flatus: n  Bowel movement:n  Voiding spontaneously: jimenez  Pain controlled: YES   Nausea: NO            Vomiting: NO  Diarrhea: NO         OOB/Ambulating: yes	    MEDICATIONS  (STANDING):  aspirin  chewable 81 milliGRAM(s) Oral every other day  BUpivacaine liposome 1.3% Injectable (no eMAR) 20 milliLiter(s) Local Injection once  heparin  Injectable 5000 Unit(s) SubCutaneous every 8 hours  influenza   Vaccine 0.5 milliLiter(s) IntraMuscular once  lactated ringers. 1000 milliLiter(s) (100 mL/Hr) IV Continuous <Continuous>  levETIRAcetam  IVPB 500 milliGRAM(s) IV Intermittent every 12 hours    MEDICATIONS  (PRN):  HYDROmorphone  Injectable 0.3 milliGRAM(s) IV Push every 4 hours PRN Severe Pain (7 - 10)  ondansetron Injectable 4 milliGRAM(s) IV Push every 6 hours PRN Nausea      Vital Signs Last 24 Hrs  T(C): 37.8 (11 Nov 2018 06:07), Max: 37.8 (11 Nov 2018 06:07)  T(F): 100 (11 Nov 2018 06:07), Max: 100 (11 Nov 2018 06:07)  HR: 89 (11 Nov 2018 06:07) (62 - 95)  BP: 144/77 (11 Nov 2018 06:07) (126/73 - 160/77)  BP(mean): --  RR: 17 (11 Nov 2018 06:07) (17 - 18)  SpO2: 95% (11 Nov 2018 06:07) (94% - 100%)    I&O's Detail    10 Nov 2018 07:01  -  11 Nov 2018 07:00  --------------------------------------------------------  IN:    IV PiggyBack: 100 mL    lactated ringers.: 2375 mL  Total IN: 2475 mL    OUT:    Indwelling Catheter - Urethral: 2350 mL  Total OUT: 2350 mL    Total NET: 125 mL          PHYSICAL EXAM:  Alert, oriented  Lungs:  CTA bilaterally, good inspiratory effort  Cor:  RRR  Abdomen:  soft, nondistended, nontender/incisional tenderness, +bowel sounds, incision/dressing clean dry, intact with no erythema  Ext:  no edema, well-perfused      LABS:                        10.5   7.2   )-----------( 177      ( 10 Nov 2018 06:23 )             31.4     11-11    138  |  100  |  8   ----------------------------<  100<H>  4.0   |  22  |  0.54    Ca    8.5      11 Nov 2018 06:31  Phos  2.1     11-11  Mg     2.0     11-11
CRS Attending for Dr. Green  Pt seen and examined. No acute events. C/o incisional pain, controlled with prn pain meds. No nausea/vomit, tolerating LRD. +GI function    Vital Signs Last 24 Hrs  T(C): 36.4 (17 Nov 2018 05:42), Max: 37.4 (16 Nov 2018 20:10)  T(F): 97.5 (17 Nov 2018 05:42), Max: 99.4 (16 Nov 2018 20:10)  HR: 92 (17 Nov 2018 05:42) (85 - 92)  BP: 93/65 (17 Nov 2018 05:42) (93/65 - 146/82)  BP(mean): --  RR: 17 (17 Nov 2018 05:42) (17 - 18)  SpO2: 94% (17 Nov 2018 05:42) (94% - 97%)    I&O's Summary    16 Nov 2018 07:01  -  17 Nov 2018 07:00  --------------------------------------------------------  IN: 1165 mL / OUT: 475 mL / NET: 690 mL    Gen NAD  Abd soft ND mild incisional tenderness no rebound no guarding
ID: [id number] pt with PMH Large bowel obstruction s/p descending loop colostomy s/p ex lap, CONNIE, colostomy take down, sigmoidectomy primary anastamosis 11/9    24 Hour Events: -F/-BM, pain improved with toradol, ambulating, decreased IVF, started PPI    SUBJECTIVE: Patient reports some difficulty sleeping, but pain improving      OBJECTIVE:    Vital Signs:  Vital Signs Last 24 Hrs  T(C): 37.2 (13 Nov 2018 05:45), Max: 37.6 (12 Nov 2018 16:52)  T(F): 98.9 (13 Nov 2018 05:45), Max: 99.6 (12 Nov 2018 16:52)  HR: 82 (13 Nov 2018 05:45) (82 - 99)  BP: 175/91 (13 Nov 2018 05:45) (104/73 - 175/91)  BP(mean): --  RR: 17 (13 Nov 2018 05:45) (17 - 17)  SpO2: 82% (13 Nov 2018 05:45) (82% - 97%)    Physical Exam:  General: NAD  Pulmonary: Nonlabored breathing, no respiratory distress  Cardiovascular: No heaves/thrills  Abdominal: Soft, NT/ND, midline incision CDI with staples, prior ostomy site CDI  Extremities: WWP, no clubbing/cyanosis/edema  Neuro: AAO x3, no focal deficits    Lines/Drains/Tubes:    Ins and Outs:  I&O's Summary    12 Nov 2018 07:01  -  13 Nov 2018 07:00  --------------------------------------------------------  IN: 1795 mL / OUT: 1700 mL / NET: 95 mL        Labs:                        10.3   7.2   )-----------( 219      ( 12 Nov 2018 07:06 )             30.7     11-12    135  |  97  |  6<L>  ----------------------------<  119<H>  4.0   |  20<L>  |  0.46<L>    Ca    8.9      12 Nov 2018 07:06  Phos  2.4     11-12  Mg     1.9     11-12          CAPILLARY BLOOD GLUCOSE            Radiology & Additional Studies:
Attending Surgeon Progress Note (Covering for Dr. Green/Dr. Mueller)/david      POST OPERATIVE DAY #: 1    SUBJECTIVE:  Flatus: no  Bowel movement: no  Voiding spontaneously: jimenez in place  Pain controlled: fair  Nausea: NO            Vomiting: NO  Diarrhea: NO         OOB/Ambulating: no	    MEDICATIONS  (STANDING):  BUpivacaine liposome 1.3% Injectable (no eMAR) 20 milliLiter(s) Local Injection once  heparin  Injectable 5000 Unit(s) SubCutaneous every 8 hours  influenza   Vaccine 0.5 milliLiter(s) IntraMuscular once  lactated ringers. 1000 milliLiter(s) (100 mL/Hr) IV Continuous <Continuous>  levETIRAcetam  IVPB 500 milliGRAM(s) IV Intermittent every 12 hours    MEDICATIONS  (PRN):  HYDROmorphone  Injectable 0.3 milliGRAM(s) IV Push every 1 hour PRN Severe Pain (7 - 10)  ondansetron Injectable 4 milliGRAM(s) IV Push every 6 hours PRN Nausea      Vital Signs Last 24 Hrs  T(C): 37.3 (10 Nov 2018 05:00), Max: 37.5 (10 Nov 2018 00:40)  T(F): 99.2 (10 Nov 2018 05:00), Max: 99.5 (10 Nov 2018 00:40)  HR: 63 (10 Nov 2018 05:00) (58 - 70)  BP: 119/68 (10 Nov 2018 05:00) (119/68 - 158/83)  BP(mean): 93 (09 Nov 2018 19:59) (92 - 117)  RR: 18 (10 Nov 2018 05:00) (11 - 30)  SpO2: 99% (10 Nov 2018 05:00) (99% - 100%)    I&O's Detail    09 Nov 2018 07:01  -  10 Nov 2018 07:00  --------------------------------------------------------  IN:    lactated ringers.: 1800 mL    Other: 1550 mL  Total IN: 3350 mL    OUT:    Indwelling Catheter - Urethral: 1165 mL    Other: 200 mL  Total OUT: 1365 mL    Total NET: 1985 mL      10 Nov 2018 07:01  -  10 Nov 2018 08:24  --------------------------------------------------------  IN:    lactated ringers.: 100 mL  Total IN: 100 mL    OUT:  Total OUT: 0 mL    Total NET: 100 mL          PHYSICAL EXAM:  Alert, oriented  Lungs:  CTA bilaterally, good inspiratory effort  Cor:  RRR  Abdomen:  soft, nondistended, nontender/incisional tenderness, +bowel sounds, incision/dressing clean dry, intact with no erythema  Ext:  no edema, well-perfused      LABS:                        10.5   7.2   )-----------( 177      ( 10 Nov 2018 06:23 )             31.4     11-10    139  |  101  |  10  ----------------------------<  127<H>  3.9   |  24  |  0.65    Ca    8.6      10 Nov 2018 06:23  Phos  3.3     11-10  Mg     1.7     11-10
DAILY PROGRESS NOTE:    S: +BF, tolerating diet    O:    Vital Signs Last 24 Hrs  T(C): 36.4 (17 Nov 2018 05:42), Max: 37.4 (16 Nov 2018 20:10)  T(F): 97.5 (17 Nov 2018 05:42), Max: 99.4 (16 Nov 2018 20:10)  HR: 92 (17 Nov 2018 05:42) (85 - 92)  BP: 93/65 (17 Nov 2018 05:42) (93/65 - 146/82)  BP(mean): --  RR: 17 (17 Nov 2018 05:42) (17 - 18)  SpO2: 94% (17 Nov 2018 05:42) (94% - 97%)    I&O's Detail    16 Nov 2018 07:01  -  17 Nov 2018 07:00  --------------------------------------------------------  IN:    dextrose 5% + sodium chloride 0.45% with potassium chloride 20 mEq/L: 880 mL    Oral Fluid: 285 mL  Total IN: 1165 mL    OUT:    Voided: 475 mL  Total OUT: 475 mL    Total NET: 690 mL      17 Nov 2018 07:01  -  17 Nov 2018 13:02  --------------------------------------------------------  IN:    Oral Fluid: 240 mL  Total IN: 240 mL    OUT:  Total OUT: 0 mL    Total NET: 240 mL         Physical Exam:  General: NAD  Pulmonary: Nonlabored breathing, no respiratory distress  Cardiovascular: No heaves/thrills  Abdominal: Soft, NT/ND, midline incision with staples and CDI, prior ostomy CDI  Extremities: WWP, no clubbing/cyanosis/edema  Neuro: AAO x3, no focal deficits    LABS:                RADIOLOGY & ADDITIONAL STUDIES:
DAILY PROGRESS NOTE:    S: -BF, -N/V    O:    Vital Signs Last 24 Hrs  T(C): 37.5 (10 Nov 2018 20:41), Max: 37.5 (10 Nov 2018 00:40)  T(F): 99.5 (10 Nov 2018 20:41), Max: 99.5 (10 Nov 2018 00:40)  HR: 95 (10 Nov 2018 20:41) (61 - 95)  BP: 145/73 (10 Nov 2018 20:41) (119/68 - 160/77)  BP(mean): --  RR: 18 (10 Nov 2018 16:16) (17 - 18)  SpO2: 95% (10 Nov 2018 20:41) (94% - 100%)    I&O's Detail    09 Nov 2018 07:01  -  10 Nov 2018 07:00  --------------------------------------------------------  IN:    lactated ringers.: 1800 mL    Other: 1550 mL  Total IN: 3350 mL    OUT:    Indwelling Catheter - Urethral: 1165 mL    Other: 200 mL  Total OUT: 1365 mL    Total NET: 1985 mL      10 Nov 2018 07:01  -  10 Nov 2018 20:50  --------------------------------------------------------  IN:    lactated ringers.: 1200 mL  Total IN: 1200 mL    OUT:    Indwelling Catheter - Urethral: 1150 mL  Total OUT: 1150 mL    Total NET: 50 mL          Physical Exam:    General: NAD  Pulm: normal resp effort and excursion  Abd: soft, non-tender, non-distended, surgical wounds clean dry and itnact    LABS:                        10.5   7.2   )-----------( 177      ( 10 Nov 2018 06:23 )             31.4     11-10    139  |  101  |  10  ----------------------------<  127<H>  3.9   |  24  |  0.65    Ca    8.6      10 Nov 2018 06:23  Phos  3.3     11-10  Mg     1.7     11-10            RADIOLOGY & ADDITIONAL STUDIES:
Gurgling with MoM, but no flatus  AFVSS  Abd soft, mild dist, incisions clean    A/P: POD 6 sigmoid resection with reversal of colostomy.  Ileus  1. Maintenance IVF  2. OOB, IS  3. Awaiting return of bowel function.   4. Hold off on further laxatives.
Had flatus yesterday AM.  Tolerating clears but no bowel function since then.  AFVSS  Abd soft, mild distention. Incision clean. Colostomy closure site clean    A/P: POD 7 open sigmoid colectomy with colostomy closure. Resolving ileus  1. Continue on clear liquids until more bowl function  2. HLIV  3. OOB, IS  4. Patient reports taking only 20mg Keppra per day. Will reduce dose to this  5. SCDs, SQH  6. Pathology reviewed.    Dr. Stratton covering until 11/19
ID: [id number] pt with PMH Large bowel obstruction s/p descending loop colostomy s/p ex lap, CONNIE, colostomy take down, sigmoidectomy primary anastamosis 11/9    24 Hour Events: -N/V, -BF, restarted ASA every other day    SUBJECTIVE: Patient denies flatus, but tolerating sips. Pain controlled.      OBJECTIVE:    Vital Signs:  Vital Signs Last 24 Hrs  T(C): 37.8 (11 Nov 2018 06:07), Max: 37.8 (11 Nov 2018 06:07)  T(F): 100 (11 Nov 2018 06:07), Max: 100 (11 Nov 2018 06:07)  HR: 89 (11 Nov 2018 06:07) (62 - 95)  BP: 144/77 (11 Nov 2018 06:07) (126/73 - 160/77)  BP(mean): --  RR: 17 (11 Nov 2018 06:07) (17 - 18)  SpO2: 95% (11 Nov 2018 06:07) (94% - 100%)    Physical Exam:  General: NAD  Pulmonary: Nonlabored breathing, no respiratory distress  Cardiovascular: No heaves/thrills  Abdominal: Soft, NT/ND, midline incisions with staples CDI, prior ostomy site packed and CDI  : Sparks draining clear pink urine  Extremities: WWP, no clubbing/cyanosis/edema  Neuro: AAO x3, no focal deficits    Lines/Drains/Tubes:    Ins and Outs:  I&O's Summary    10 Nov 2018 07:01  -  11 Nov 2018 07:00  --------------------------------------------------------  IN: 2475 mL / OUT: 2350 mL / NET: 125 mL        Labs:                        9.6    7.4   )-----------( 179      ( 11 Nov 2018 06:31 )             29.0     11-11    138  |  100  |  8   ----------------------------<  100<H>  4.0   |  22  |  0.54    Ca    8.5      11 Nov 2018 06:31  Phos  2.1     11-11  Mg     2.0     11-11          CAPILLARY BLOOD GLUCOSE            Radiology & Additional Studies:
ID: [id number] pt with PMH Large bowel obstruction s/p descending loop colostomy s/p ex lap, CONNIE, colostomy take down, sigmoidectomy primary anastamosis 11/9    24 Hour Events: T 100.5, tylenol given    SUBJECTIVE: Patient denies bowel function, endorses mld lower abdominal pain and heartburn      OBJECTIVE:    Vital Signs:  Vital Signs Last 24 Hrs  T(C): 37.4 (12 Nov 2018 08:05), Max: 38.1 (11 Nov 2018 16:35)  T(F): 99.3 (12 Nov 2018 08:05), Max: 100.5 (11 Nov 2018 16:35)  HR: 93 (12 Nov 2018 08:05) (77 - 93)  BP: 167/97 (12 Nov 2018 08:05) (139/80 - 168/91)  BP(mean): --  RR: 17 (12 Nov 2018 08:05) (17 - 17)  SpO2: 97% (12 Nov 2018 08:05) (94% - 97%)    Physical Exam:  General: NAD  Pulmonary: Nonlabored breathing, no respiratory distress  Cardiovascular: No heaves/thrills  Abdominal: Soft, nondistended, appropriately tender, midline incision with staples and CDI, prior colostomy site CDI  Extremities: WWP, no clubbing/cyanosis/edema  Neuro: AAO x3, no focal deficits    Lines/Drains/Tubes:    Ins and Outs:  I&O's Summary    11 Nov 2018 07:01  -  12 Nov 2018 07:00  --------------------------------------------------------  IN: 1975 mL / OUT: 2450 mL / NET: -475 mL    12 Nov 2018 07:01  -  12 Nov 2018 11:27  --------------------------------------------------------  IN: 260 mL / OUT: 300 mL / NET: -40 mL        Labs:                        10.3   7.2   )-----------( 219      ( 12 Nov 2018 07:06 )             30.7     11-12    135  |  97  |  6<L>  ----------------------------<  119<H>  4.0   |  20<L>  |  0.46<L>    Ca    8.9      12 Nov 2018 07:06  Phos  2.4     11-12  Mg     1.9     11-12          CAPILLARY BLOOD GLUCOSE            Radiology & Additional Studies:
ID: [id number] pt with PMH Large bowel obstruction s/p descending loop colostomy s/p ex lap, CONNIE, colostomy take down, sigmoidectomy primary anastomosis 11/9    24 Hour Events: 15ml MoM given as per Dr. Green    SUBJECTIVE: Patient passing flatus      OBJECTIVE:    Vital Signs:  Vital Signs Last 24 Hrs  T(C): 36.8 (15 Nov 2018 05:50), Max: 36.9 (14 Nov 2018 17:11)  T(F): 98.2 (15 Nov 2018 05:50), Max: 98.5 (14 Nov 2018 17:11)  HR: 81 (15 Nov 2018 05:50) (81 - 86)  BP: 135/82 (15 Nov 2018 05:50) (128/79 - 147/66)  BP(mean): --  RR: 18 (15 Nov 2018 05:50) (17 - 18)  SpO2: 96% (15 Nov 2018 05:50) (95% - 97%)    Physical Exam:  General: NAD  Pulmonary: Nonlabored breathing, no respiratory distress  Cardiovascular: No heaves/thrills  Abdominal: Soft, NT/ND, midline incision with staples and CDI, prior ostomy pink and CDI  Extremities: WWP, no clubbing/cyanosis/edema  Neuro: AAO x3, no focal deficits    Lines/Drains/Tubes:    Ins and Outs:  I&O's Summary    14 Nov 2018 07:01  -  15 Nov 2018 07:00  --------------------------------------------------------  IN: 840 mL / OUT: 200 mL / NET: 640 mL        Labs:                        9.2    6.1   )-----------( 296      ( 15 Nov 2018 10:44 )             27.2     11-15    139  |  103  |  4<L>  ----------------------------<  135<H>  3.5   |  22  |  0.54    Ca    9.3      15 Nov 2018 10:44  Phos  3.7     11-15  Mg     2.0     11-15          CAPILLARY BLOOD GLUCOSE            Radiology & Additional Studies:
ID: [id number] pt with PMH Large bowel obstruction s/p descending loop colostomy s/p ex lap, CONNIE, colostomy take down, sigmoidectomy primary anastomosis 11/9    24 Hour Events: Tolerating CLD, +F/-BM, ambulating    SUBJECTIVE: Patient voiding well and ambulating       OBJECTIVE:    Vital Signs:  Vital Signs Last 24 Hrs  T(C): 37.3 (16 Nov 2018 08:14), Max: 37.4 (15 Nov 2018 20:25)  T(F): 99.1 (16 Nov 2018 08:14), Max: 99.4 (16 Nov 2018 05:01)  HR: 100 (16 Nov 2018 08:14) (78 - 100)  BP: 138/78 (16 Nov 2018 08:14) (132/80 - 147/84)  BP(mean): --  RR: 18 (16 Nov 2018 08:14) (16 - 18)  SpO2: 100% (16 Nov 2018 08:14) (96% - 100%)    Physical Exam:  General: NAD  Pulmonary: Nonlabored breathing, no respiratory distress  Cardiovascular: No heaves/thrills  Abdominal: Soft, NT/ND, midline incision with staples and CDI, prior ostomy CDI  Extremities: WWP, no clubbing/cyanosis/edema  Neuro: AAO x3, no focal deficits    Lines/Drains/Tubes:    Ins and Outs:  I&O's Summary    15 Nov 2018 07:01  -  16 Nov 2018 07:00  --------------------------------------------------------  IN: 1090 mL / OUT: 500 mL / NET: 590 mL    16 Nov 2018 07:01  -  16 Nov 2018 11:54  --------------------------------------------------------  IN: 285 mL / OUT: 475 mL / NET: -190 mL        Labs:                        9.2    6.1   )-----------( 296      ( 15 Nov 2018 10:44 )             27.2     11-15    139  |  103  |  4<L>  ----------------------------<  135<H>  3.5   |  22  |  0.54    Ca    9.3      15 Nov 2018 10:44  Phos  3.7     11-15  Mg     2.0     11-15          CAPILLARY BLOOD GLUCOSE            Radiology & Additional Studies:
Interval Events: Reviewed  Patient seen and examined at bedside.    Patient is a 73y old  Female who presents with a chief complaint of Colostomy reversal (13 Nov 2018 08:13)    She is taking a liquid diet and doing okay.  PAST MEDICAL & SURGICAL HISTORY:  H/O pleural effusion  Bowel obstruction  Lyme disease  CAD (coronary artery disease): cardaic stent x 1  Meningioma  High cholesterol  History of appendectomy  History of craniotomy: for menigioma, 2017  History of laparoscopy: for endometriosis x 2  Colostomy in place  S/P EVERARDO (total abdominal hysterectomy)      MEDICATIONS:  Pulmonary:    Antimicrobials:    Anticoagulants:  aspirin  chewable 81 milliGRAM(s) Oral every other day  heparin  Injectable 5000 Unit(s) SubCutaneous every 8 hours    Cardiac:  amLODIPine   Tablet 2.5 milliGRAM(s) Oral daily      Allergies    No Known Allergies    Intolerances        Vital Signs Last 24 Hrs  T(C): 36.4 (17 Nov 2018 05:42), Max: 37.4 (16 Nov 2018 20:10)  T(F): 97.5 (17 Nov 2018 05:42), Max: 99.4 (16 Nov 2018 20:10)  HR: 92 (17 Nov 2018 05:42) (85 - 92)  BP: 93/65 (17 Nov 2018 05:42) (93/65 - 146/82)  BP(mean): --  RR: 17 (17 Nov 2018 05:42) (17 - 18)  SpO2: 94% (17 Nov 2018 05:42) (94% - 97%)    11-16 @ 07:01 - 11-17 @ 07:00  --------------------------------------------------------  IN: 1165 mL / OUT: 475 mL / NET: 690 mL    11-17 @ 07:01 - 11-17 @ 12:38  --------------------------------------------------------  IN: 240 mL / OUT: 0 mL / NET: 240 mL          LABS:                                  RADIOLOGY & ADDITIONAL STUDIES (The following images were personally reviewed):  Sparks:                                     No  Urine output:                       adequate  DVT prophylaxis:                 Yes  Flattus:                                  Yes  Bowel movement:              No
Interval Events: Reviewed  Patient seen and examined at bedside.    Patient is a 73y old  Female who presents with a chief complaint of Colostomy reversal (13 Nov 2018 08:13)    She is walking around with no difficulty. She is doing better. Blood pressure is over concerned. She started a liquid  PAST MEDICAL & SURGICAL HISTORY:  H/O pleural effusion  Bowel obstruction  Lyme disease  CAD (coronary artery disease): cardaic stent x 1  Meningioma  High cholesterol  History of appendectomy  History of craniotomy: for menigioma, 2017  History of laparoscopy: for endometriosis x 2  Colostomy in place  S/P EVERARDO (total abdominal hysterectomy)      MEDICATIONS:  Pulmonary:    Antimicrobials:    Anticoagulants:  aspirin  chewable 81 milliGRAM(s) Oral every other day  heparin  Injectable 5000 Unit(s) SubCutaneous every 8 hours    Cardiac:  amLODIPine   Tablet 2.5 milliGRAM(s) Oral daily      Allergies    No Known Allergies    Intolerances        Vital Signs Last 24 Hrs  T(C): 36.4 (17 Nov 2018 05:42), Max: 37.4 (16 Nov 2018 20:10)  T(F): 97.5 (17 Nov 2018 05:42), Max: 99.4 (16 Nov 2018 20:10)  HR: 92 (17 Nov 2018 05:42) (85 - 92)  BP: 93/65 (17 Nov 2018 05:42) (93/65 - 146/82)  BP(mean): --  RR: 17 (17 Nov 2018 05:42) (17 - 18)  SpO2: 94% (17 Nov 2018 05:42) (94% - 97%)    11-16 @ 07:01 - 11-17 @ 07:00  --------------------------------------------------------  IN: 1165 mL / OUT: 475 mL / NET: 690 mL    11-17 @ 07:01 - 11-17 @ 12:35  --------------------------------------------------------  IN: 240 mL / OUT: 0 mL / NET: 240 mL          LABS:                                  RADIOLOGY & ADDITIONAL STUDIES (The following images were personally reviewed):  Sparks:                                     No  Urine output:                       adequate  DVT prophylaxis:                 Yes  Flattus:                                  Yes  Bowel movement:              No
Interval Events: Reviewed  Patient seen and examined at bedside.    Patient is a 73y old  Female who presents with a chief complaint of Colostomy reversal (13 Nov 2018 08:13)    she had a BM and doing well.  She is walking  PAST MEDICAL & SURGICAL HISTORY:  H/O pleural effusion  Bowel obstruction  Lyme disease  CAD (coronary artery disease): cardaic stent x 1  Meningioma  High cholesterol  History of appendectomy  History of craniotomy: for menigioma, 2017  History of laparoscopy: for endometriosis x 2  Colostomy in place  S/P EVERARDO (total abdominal hysterectomy)      MEDICATIONS:  Pulmonary:    Antimicrobials:    Anticoagulants:  aspirin  chewable 81 milliGRAM(s) Oral every other day  heparin  Injectable 5000 Unit(s) SubCutaneous every 8 hours    Cardiac:  amLODIPine   Tablet 2.5 milliGRAM(s) Oral daily      Allergies    No Known Allergies    Intolerances        Vital Signs Last 24 Hrs  T(C): 37.2 (13 Nov 2018 05:45), Max: 37.6 (12 Nov 2018 16:52)  T(F): 98.9 (13 Nov 2018 05:45), Max: 99.6 (12 Nov 2018 16:52)  HR: 82 (13 Nov 2018 05:45) (82 - 99)  BP: 175/91 (13 Nov 2018 05:45) (104/73 - 175/91)  BP(mean): --  RR: 17 (13 Nov 2018 05:45) (17 - 17)  SpO2: 82% (13 Nov 2018 05:45) (82% - 97%)    11-12 @ 07:01  -  11-13 @ 07:00  --------------------------------------------------------  IN: 1795 mL / OUT: 1700 mL / NET: 95 mL          LABS:      CBC Full  -  ( 12 Nov 2018 07:06 )  WBC Count : 7.2 K/uL  Hemoglobin : 10.3 g/dL  Hematocrit : 30.7 %  Platelet Count - Automated : 219 K/uL  Mean Cell Volume : 89.8 fL  Mean Cell Hemoglobin : 30.1 pg  Mean Cell Hemoglobin Concentration : 33.6 g/dL  Auto Neutrophil # : x  Auto Lymphocyte # : x  Auto Monocyte # : x  Auto Eosinophil # : x  Auto Basophil # : x  Auto Neutrophil % : x  Auto Lymphocyte % : x  Auto Monocyte % : x  Auto Eosinophil % : x  Auto Basophil % : x    11-12    135  |  97  |  6<L>  ----------------------------<  119<H>  4.0   |  20<L>  |  0.46<L>    Ca    8.9      12 Nov 2018 07:06  Phos  2.4     11-12  Mg     1.9     11-12                          RADIOLOGY & ADDITIONAL STUDIES (The following images were personally reviewed):  Sparks:                                     No  Urine output:                       adequate  DVT prophylaxis:                 Yes  Flattus:                                  Yes  Bowel movement:              yes
Interval Events: Reviewed  Patient seen and examined at bedside.    Patient is a 73y old  Female who presents with a chief complaint of Colostomy reversal (13 Nov 2018 08:13)  she is concerned about the elevated blood pressure otherwise she is doing well    PAST MEDICAL & SURGICAL HISTORY:  H/O pleural effusion  Bowel obstruction  Lyme disease  CAD (coronary artery disease): cardaic stent x 1  Meningioma  High cholesterol  History of appendectomy  History of craniotomy: for menigioma, 2017  History of laparoscopy: for endometriosis x 2  Colostomy in place  S/P EVERARDO (total abdominal hysterectomy)      MEDICATIONS:  Pulmonary:    Antimicrobials:    Anticoagulants:  aspirin  chewable 81 milliGRAM(s) Oral every other day  heparin  Injectable 5000 Unit(s) SubCutaneous every 8 hours    Cardiac:      Allergies    No Known Allergies    Intolerances        Vital Signs Last 24 Hrs  T(C): 37.2 (13 Nov 2018 05:45), Max: 37.6 (12 Nov 2018 16:52)  T(F): 98.9 (13 Nov 2018 05:45), Max: 99.6 (12 Nov 2018 16:52)  HR: 82 (13 Nov 2018 05:45) (82 - 99)  BP: 175/91 (13 Nov 2018 05:45) (104/73 - 175/91)  BP(mean): --  RR: 17 (13 Nov 2018 05:45) (17 - 17)  SpO2: 82% (13 Nov 2018 05:45) (82% - 97%)    11-12 @ 07:01  -  11-13 @ 07:00  --------------------------------------------------------  IN: 1795 mL / OUT: 1700 mL / NET: 95 mL          LABS:      CBC Full  -  ( 12 Nov 2018 07:06 )  WBC Count : 7.2 K/uL  Hemoglobin : 10.3 g/dL  Hematocrit : 30.7 %  Platelet Count - Automated : 219 K/uL  Mean Cell Volume : 89.8 fL  Mean Cell Hemoglobin : 30.1 pg  Mean Cell Hemoglobin Concentration : 33.6 g/dL  Auto Neutrophil # : x  Auto Lymphocyte # : x  Auto Monocyte # : x  Auto Eosinophil # : x  Auto Basophil # : x  Auto Neutrophil % : x  Auto Lymphocyte % : x  Auto Monocyte % : x  Auto Eosinophil % : x  Auto Basophil % : x    11-12    135  |  97  |  6<L>  ----------------------------<  119<H>  4.0   |  20<L>  |  0.46<L>    Ca    8.9      12 Nov 2018 07:06  Phos  2.4     11-12  Mg     1.9     11-12                          RADIOLOGY & ADDITIONAL STUDIES (The following images were personally reviewed):  Sparks:                                     No  Urine output:                       adequate  DVT prophylaxis:                 Yes  Flattus:                                  Yes  Bowel movement:              No
Interval Events: Reviewed  Patient seen and examined at bedside.    Patient is a 73y old  Female who presents with a chief complaint of Colostomy reversal (13 Nov 2018 08:13)  she is doign well and ambulating.  She is passing gas but no BM yet    PAST MEDICAL & SURGICAL HISTORY:  H/O pleural effusion  Bowel obstruction  Lyme disease  CAD (coronary artery disease): cardaic stent x 1  Meningioma  High cholesterol  History of appendectomy  History of craniotomy: for menigioma, 2017  History of laparoscopy: for endometriosis x 2  Colostomy in place  S/P EVERARDO (total abdominal hysterectomy)      MEDICATIONS:  Pulmonary:    Antimicrobials:    Anticoagulants:  aspirin  chewable 81 milliGRAM(s) Oral every other day  heparin  Injectable 5000 Unit(s) SubCutaneous every 8 hours    Cardiac:  amLODIPine   Tablet 2.5 milliGRAM(s) Oral daily      Allergies    No Known Allergies    Intolerances        Vital Signs Last 24 Hrs  T(C): 36.2 (14 Nov 2018 08:33), Max: 37.5 (13 Nov 2018 14:17)  T(F): 97.1 (14 Nov 2018 08:33), Max: 99.5 (13 Nov 2018 14:17)  HR: 80 (14 Nov 2018 08:33) (71 - 96)  BP: 136/83 (14 Nov 2018 08:33) (125/85 - 158/90)  BP(mean): --  RR: 16 (14 Nov 2018 08:33) (16 - 18)  SpO2: 98% (14 Nov 2018 08:33) (96% - 98%)    11-13 @ 07:01 - 11-14 @ 07:00  --------------------------------------------------------  IN: 1355 mL / OUT: 1100 mL / NET: 255 mL    11-14 @ 07:01  -  11-14 @ 12:30  --------------------------------------------------------  IN: 0 mL / OUT: 200 mL / NET: -200 mL          LABS:      CBC Full  -  ( 14 Nov 2018 11:45 )  WBC Count : 4.6 K/uL  Hemoglobin : 8.8 g/dL  Hematocrit : 25.6 %  Platelet Count - Automated : 250 K/uL  Mean Cell Volume : 87.4 fL  Mean Cell Hemoglobin : 30.0 pg  Mean Cell Hemoglobin Concentration : 34.4 g/dL  Auto Neutrophil # : x  Auto Lymphocyte # : x  Auto Monocyte # : x  Auto Eosinophil # : x  Auto Basophil # : x  Auto Neutrophil % : x  Auto Lymphocyte % : x  Auto Monocyte % : x  Auto Eosinophil % : x  Auto Basophil % : x    11-14    141  |  109<H>  |  4<L>  ----------------------------<  139<H>  4.0   |  21<L>  |  0.56    Ca    9.0      14 Nov 2018 11:45  Phos  2.2     11-14  Mg     2.0     11-14                          RADIOLOGY & ADDITIONAL STUDIES (The following images were personally reviewed):  Sparks:                                     No  Urine output:                       adequate  DVT prophylaxis:                 Yes  Flattus:                                  Yes  Bowel movement:              No
Reports cough, non productive  No flatus  AFVSS  u/o ok  Abd soft, mod dist, incision and wound clean    A/P: POD 4 colostomy reversal with sigmoid resection. Ileus  1. OOB, IS  2. NPO except meds  3. Await bowel function.
Team 1 Surgery Post-Op Note    Procedure: Revision, closure, or reversal, colostomy or ileostomy  Open sigmoidectomy  Lysis of adhesions    Surgeon: Peter    Subjective: Patient denies nausea and endorses mild incisional pain    Vital Signs Last 24 Hrs  T(C): 37.4 (09 Nov 2018 13:58), Max: 37.4 (09 Nov 2018 13:58)  T(F): 99.4 (09 Nov 2018 13:58), Max: 99.4 (09 Nov 2018 13:58)  HR: 62 (09 Nov 2018 15:58) (58 - 70)  BP: 156/82 (09 Nov 2018 15:58) (135/72 - 158/79)  BP(mean): 98 (09 Nov 2018 14:58) (98 - 117)  RR: 15 (09 Nov 2018 15:58) (15 - 30)  SpO2: 100% (09 Nov 2018 15:58) (100% - 100%)    Physical Exam:  General: NAD, resting comfortably in bed  Pulmonary: Nonlabored breathing, no respiratory distress  Cardiovascular: No heaves or thrills  Abdominal: Soft, NT/ND, obese, incisions CDI  : Sparks draining clear yellow urine  Extremities: WWP, no cyanosis  Neuro: A/O x 3      LABS:                        11.9   8.0   )-----------( 192      ( 09 Nov 2018 14:16 )             35.4     11-09    138  |  99  |  10  ----------------------------<  162<H>  3.8   |  21<L>  |  0.64    Ca    9.2      09 Nov 2018 14:16        CAPILLARY BLOOD GLUCOSE      POCT Blood Glucose.: 118 mg/dL (09 Nov 2018 07:12)        ABO Interpretation: O (11-09 @ 07:33)          Assessment:73y Female s/p above procedure    Plan:  Pain/nausea control IV PRN  Home meds  Incentive spirometer/OOB/Ambulate  IVF  Post-op labs, AM labs  Keppra
c/o cramping and reflux  No flatus  Low grade temp. VSS  u/o excellent.     Abd soft, mod distended, incisions clean    A/P: POD 3 sigmoid colectomy with colostomy closure.  Ileus  1. OOB, IS  2. Toradol  3. IV to maintenance, lower rate to 70  4. Await bowel function.  If vomiting, place NGT.
Post-op Day# 5 ex-lap, CONNIE, colostomy take down and sigmoidectomy    SUBJECTIVE: Patient seen and examined bedside. Patient reports that her pain is controlled.    amLODIPine   Tablet 2.5 milliGRAM(s) Oral daily  aspirin  chewable 81 milliGRAM(s) Oral every other day  heparin  Injectable 5000 Unit(s) SubCutaneous every 8 hours      Vital Signs Last 24 Hrs  T(C): 37.1 (14 Nov 2018 05:42), Max: 37.5 (13 Nov 2018 14:17)  T(F): 98.8 (14 Nov 2018 05:42), Max: 99.5 (13 Nov 2018 14:17)  HR: 71 (14 Nov 2018 05:42) (71 - 96)  BP: 125/85 (14 Nov 2018 05:42) (125/85 - 158/90)  BP(mean): --  RR: 17 (14 Nov 2018 05:42) (16 - 20)  SpO2: 96% (14 Nov 2018 05:42) (96% - 98%)  I&O's Detail    13 Nov 2018 07:01  -  14 Nov 2018 07:00  --------------------------------------------------------  IN:    dextrose 5% + sodium chloride 0.45% with potassium chloride 20 mEq/L: 1155 mL    IV PiggyBack: 200 mL  Total IN: 1355 mL    OUT:    Voided: 1100 mL  Total OUT: 1100 mL    Total NET: 255 mL          General: NAD, resting comfortably in bed  C/V: NSR  Pulm: Nonlabored breathing, no respiratory distress  Abd: soft, NT/ND. surgical incisions clean, dry ,and intact.  Extrem: WWP, no edema, SCDs in place        LABS:                        9.6    6.4   )-----------( 268      ( 13 Nov 2018 14:45 )             27.8     11-13    138  |  100  |  5<L>  ----------------------------<  143<H>  3.6   |  22  |  0.51    Ca    8.9      13 Nov 2018 14:45  Phos  1.4     11-13  Mg     2.4     11-13            RADIOLOGY & ADDITIONAL STUDIES:

## 2018-11-17 NOTE — PROGRESS NOTE ADULT - PROVIDER SPECIALTY LIST ADULT
Colorectal Surgery
Internal Medicine
Surgery
Colorectal Surgery
Colorectal Surgery

## 2018-11-17 NOTE — PROGRESS NOTE ADULT - ASSESSMENT
- dispo planning  - outpatient f/u with Dr. Green  - discussed with Dr. Sandoval, senior surgical resident caring for patient

## 2018-11-21 DIAGNOSIS — K56.50 INTESTINAL ADHESIONS [BANDS], UNSPECIFIED AS TO PARTIAL VERSUS COMPLETE OBSTRUCTION: ICD-10-CM

## 2018-11-21 DIAGNOSIS — I25.2 OLD MYOCARDIAL INFARCTION: ICD-10-CM

## 2018-11-21 DIAGNOSIS — Z43.3 ENCOUNTER FOR ATTENTION TO COLOSTOMY: ICD-10-CM

## 2018-11-21 DIAGNOSIS — I25.10 ATHEROSCLEROTIC HEART DISEASE OF NATIVE CORONARY ARTERY WITHOUT ANGINA PECTORIS: ICD-10-CM

## 2018-11-21 DIAGNOSIS — Z90.711 ACQUIRED ABSENCE OF UTERUS WITH REMAINING CERVICAL STUMP: ICD-10-CM

## 2018-11-21 DIAGNOSIS — K57.92 DIVERTICULITIS OF INTESTINE, PART UNSPECIFIED, WITHOUT PERFORATION OR ABSCESS WITHOUT BLEEDING: ICD-10-CM

## 2018-11-21 DIAGNOSIS — E78.5 HYPERLIPIDEMIA, UNSPECIFIED: ICD-10-CM

## 2018-11-21 DIAGNOSIS — D32.9 BENIGN NEOPLASM OF MENINGES, UNSPECIFIED: ICD-10-CM

## 2018-11-21 DIAGNOSIS — Z95.5 PRESENCE OF CORONARY ANGIOPLASTY IMPLANT AND GRAFT: ICD-10-CM

## 2018-12-06 NOTE — ED PROVIDER NOTE - MEDICAL DECISION MAKING DETAILS
74 yo female recently treated for colitis and discharge home earlier today, came back to ER with worsening of abdominal pain, severe abdominal distention. Initial lactic acid 4. repeat labs and CT scan done and surgery has been consulted. pt recommended to be admitted to surgical ICU for further management. while in the ER hemodynamically stable. increased appetite

## 2018-12-16 NOTE — ED PROVIDER NOTE - CROS ED CONS ALL NEG
negative... [Chest Pain] : chest pain [Palpitations] : no palpitations [Lower Ext Edema] : no lower extremity edema [Negative] : Heme/Lymph

## 2018-12-24 NOTE — PATIENT PROFILE ADULT. - HEALTHCARE QUESTIONS, PROFILE
none Patient presented to the ED with heavy vaginal bleeding 4 days postpartum. Per ED, patient had 1L PPH and pt had an additional 200cc VB on OB evaluation. TAUS did not reveal thickened endometrium or retained POC. She was noted to have elevated LFTs. Hepatitis panel sent and was negative. On HD 2, patient's bleeding had significantly resolved and patient had no focal complaints. She was discharged home with bleeding precautions and is to follow up in the [facility] ACU clinic.

## 2019-02-18 NOTE — ED PROVIDER NOTE - GENITOURINARY NEGATIVE STATEMENT, MLM
Spoke with Nicho and informed her of CPT code per referral pool. Jayna Ramsey will be working on this case and will get back to our office as soon as possible. no dysuria, no frequency, and no hematuria.

## 2019-03-08 ENCOUNTER — RX RENEWAL (OUTPATIENT)
Age: 74
End: 2019-03-08

## 2019-03-25 ENCOUNTER — RX CHANGE (OUTPATIENT)
Age: 74
End: 2019-03-25

## 2019-08-19 ENCOUNTER — EMERGENCY (EMERGENCY)
Facility: HOSPITAL | Age: 74
LOS: 1 days | Discharge: HOME | End: 2019-08-19
Attending: EMERGENCY MEDICINE | Admitting: EMERGENCY MEDICINE
Payer: MEDICARE

## 2019-08-19 VITALS
WEIGHT: 160.06 LBS | TEMPERATURE: 99 F | DIASTOLIC BLOOD PRESSURE: 83 MMHG | RESPIRATION RATE: 16 BRPM | SYSTOLIC BLOOD PRESSURE: 120 MMHG | HEIGHT: 66 IN | HEART RATE: 98 BPM | OXYGEN SATURATION: 99 %

## 2019-08-19 VITALS
TEMPERATURE: 99 F | SYSTOLIC BLOOD PRESSURE: 144 MMHG | OXYGEN SATURATION: 95 % | HEART RATE: 78 BPM | DIASTOLIC BLOOD PRESSURE: 85 MMHG | RESPIRATION RATE: 16 BRPM

## 2019-08-19 DIAGNOSIS — Z93.3 COLOSTOMY STATUS: Chronic | ICD-10-CM

## 2019-08-19 DIAGNOSIS — Z90.710 ACQUIRED ABSENCE OF BOTH CERVIX AND UTERUS: Chronic | ICD-10-CM

## 2019-08-19 DIAGNOSIS — Z98.890 OTHER SPECIFIED POSTPROCEDURAL STATES: Chronic | ICD-10-CM

## 2019-08-19 DIAGNOSIS — Z90.49 ACQUIRED ABSENCE OF OTHER SPECIFIED PARTS OF DIGESTIVE TRACT: Chronic | ICD-10-CM

## 2019-08-19 PROBLEM — Z87.09 PERSONAL HISTORY OF OTHER DISEASES OF THE RESPIRATORY SYSTEM: Chronic | Status: ACTIVE | Noted: 2018-11-08

## 2019-08-19 PROBLEM — K56.609 UNSPECIFIED INTESTINAL OBSTRUCTION, UNSPECIFIED AS TO PARTIAL VERSUS COMPLETE OBSTRUCTION: Chronic | Status: ACTIVE | Noted: 2018-11-08

## 2019-08-19 LAB
ALBUMIN SERPL ELPH-MCNC: 4.9 G/DL — SIGNIFICANT CHANGE UP (ref 3.3–5)
ALP SERPL-CCNC: 104 U/L — SIGNIFICANT CHANGE UP (ref 40–120)
ALT FLD-CCNC: 20 U/L — SIGNIFICANT CHANGE UP (ref 10–45)
ANION GAP SERPL CALC-SCNC: 18 MMOL/L — HIGH (ref 5–17)
AST SERPL-CCNC: 22 U/L — SIGNIFICANT CHANGE UP (ref 10–40)
BASOPHILS # BLD AUTO: 0.04 K/UL — SIGNIFICANT CHANGE UP (ref 0–0.2)
BASOPHILS NFR BLD AUTO: 0.2 % — SIGNIFICANT CHANGE UP (ref 0–2)
BILIRUB SERPL-MCNC: 1.9 MG/DL — HIGH (ref 0.2–1.2)
BUN SERPL-MCNC: 22 MG/DL — SIGNIFICANT CHANGE UP (ref 7–23)
CALCIUM SERPL-MCNC: 9.9 MG/DL — SIGNIFICANT CHANGE UP (ref 8.4–10.5)
CHLORIDE SERPL-SCNC: 105 MMOL/L — SIGNIFICANT CHANGE UP (ref 96–108)
CO2 SERPL-SCNC: 24 MMOL/L — SIGNIFICANT CHANGE UP (ref 22–31)
CREAT SERPL-MCNC: 0.95 MG/DL — SIGNIFICANT CHANGE UP (ref 0.5–1.3)
EOSINOPHIL # BLD AUTO: 0 K/UL — SIGNIFICANT CHANGE UP (ref 0–0.5)
EOSINOPHIL NFR BLD AUTO: 0 % — SIGNIFICANT CHANGE UP (ref 0–6)
GLUCOSE SERPL-MCNC: 148 MG/DL — HIGH (ref 70–99)
HCT VFR BLD CALC: 45.8 % — HIGH (ref 34.5–45)
HGB BLD-MCNC: 15.6 G/DL — HIGH (ref 11.5–15.5)
IMM GRANULOCYTES NFR BLD AUTO: 0.3 % — SIGNIFICANT CHANGE UP (ref 0–1.5)
LACTATE SERPL-SCNC: 1.8 MMOL/L — SIGNIFICANT CHANGE UP (ref 0.5–2)
LACTATE SERPL-SCNC: 2.1 MMOL/L — HIGH (ref 0.5–2)
LIDOCAIN IGE QN: 11 U/L — SIGNIFICANT CHANGE UP (ref 7–60)
LYMPHOCYTES # BLD AUTO: 0.84 K/UL — LOW (ref 1–3.3)
LYMPHOCYTES # BLD AUTO: 4.7 % — LOW (ref 13–44)
MCHC RBC-ENTMCNC: 31.1 PG — SIGNIFICANT CHANGE UP (ref 27–34)
MCHC RBC-ENTMCNC: 34.1 GM/DL — SIGNIFICANT CHANGE UP (ref 32–36)
MCV RBC AUTO: 91.2 FL — SIGNIFICANT CHANGE UP (ref 80–100)
MONOCYTES # BLD AUTO: 1.14 K/UL — HIGH (ref 0–0.9)
MONOCYTES NFR BLD AUTO: 6.4 % — SIGNIFICANT CHANGE UP (ref 2–14)
NEUTROPHILS # BLD AUTO: 15.75 K/UL — HIGH (ref 1.8–7.4)
NEUTROPHILS NFR BLD AUTO: 88.4 % — HIGH (ref 43–77)
NRBC # BLD: 0 /100 WBCS — SIGNIFICANT CHANGE UP (ref 0–0)
PLATELET # BLD AUTO: 222 K/UL — SIGNIFICANT CHANGE UP (ref 150–400)
POTASSIUM SERPL-MCNC: 4.2 MMOL/L — SIGNIFICANT CHANGE UP (ref 3.5–5.3)
POTASSIUM SERPL-SCNC: 4.2 MMOL/L — SIGNIFICANT CHANGE UP (ref 3.5–5.3)
PROT SERPL-MCNC: 8.3 G/DL — SIGNIFICANT CHANGE UP (ref 6–8.3)
RBC # BLD: 5.02 M/UL — SIGNIFICANT CHANGE UP (ref 3.8–5.2)
RBC # FLD: 13.5 % — SIGNIFICANT CHANGE UP (ref 10.3–14.5)
SODIUM SERPL-SCNC: 147 MMOL/L — HIGH (ref 135–145)
WBC # BLD: 17.83 K/UL — HIGH (ref 3.8–10.5)
WBC # FLD AUTO: 17.83 K/UL — HIGH (ref 3.8–10.5)

## 2019-08-19 PROCEDURE — 71045 X-RAY EXAM CHEST 1 VIEW: CPT

## 2019-08-19 PROCEDURE — 96375 TX/PRO/DX INJ NEW DRUG ADDON: CPT

## 2019-08-19 PROCEDURE — 99284 EMERGENCY DEPT VISIT MOD MDM: CPT | Mod: 25

## 2019-08-19 PROCEDURE — 83690 ASSAY OF LIPASE: CPT

## 2019-08-19 PROCEDURE — 83605 ASSAY OF LACTIC ACID: CPT

## 2019-08-19 PROCEDURE — 36415 COLL VENOUS BLD VENIPUNCTURE: CPT

## 2019-08-19 PROCEDURE — 87040 BLOOD CULTURE FOR BACTERIA: CPT

## 2019-08-19 PROCEDURE — 71045 X-RAY EXAM CHEST 1 VIEW: CPT | Mod: 26

## 2019-08-19 PROCEDURE — 80053 COMPREHEN METABOLIC PANEL: CPT

## 2019-08-19 PROCEDURE — 74177 CT ABD & PELVIS W/CONTRAST: CPT | Mod: 26

## 2019-08-19 PROCEDURE — 85025 COMPLETE CBC W/AUTO DIFF WBC: CPT

## 2019-08-19 PROCEDURE — 96374 THER/PROPH/DIAG INJ IV PUSH: CPT | Mod: XU

## 2019-08-19 PROCEDURE — 74177 CT ABD & PELVIS W/CONTRAST: CPT

## 2019-08-19 RX ORDER — ATORVASTATIN CALCIUM 80 MG/1
40 TABLET, FILM COATED ORAL
Qty: 0 | Refills: 0 | DISCHARGE

## 2019-08-19 RX ORDER — SODIUM CHLORIDE 9 MG/ML
1000 INJECTION INTRAMUSCULAR; INTRAVENOUS; SUBCUTANEOUS ONCE
Refills: 0 | Status: COMPLETED | OUTPATIENT
Start: 2019-08-19 | End: 2019-08-19

## 2019-08-19 RX ORDER — CEFTRIAXONE 500 MG/1
1000 INJECTION, POWDER, FOR SOLUTION INTRAMUSCULAR; INTRAVENOUS ONCE
Refills: 0 | Status: COMPLETED | OUTPATIENT
Start: 2019-08-19 | End: 2019-08-19

## 2019-08-19 RX ORDER — METRONIDAZOLE 500 MG
1 TABLET ORAL
Qty: 30 | Refills: 0
Start: 2019-08-19 | End: 2019-08-25

## 2019-08-19 RX ORDER — IOHEXOL 300 MG/ML
30 INJECTION, SOLUTION INTRAVENOUS ONCE
Refills: 0 | Status: COMPLETED | OUTPATIENT
Start: 2019-08-19 | End: 2019-08-19

## 2019-08-19 RX ORDER — ONDANSETRON 8 MG/1
4 TABLET, FILM COATED ORAL ONCE
Refills: 0 | Status: COMPLETED | OUTPATIENT
Start: 2019-08-19 | End: 2019-08-19

## 2019-08-19 RX ORDER — ASPIRIN/CALCIUM CARB/MAGNESIUM 324 MG
1 TABLET ORAL
Qty: 0 | Refills: 0 | DISCHARGE

## 2019-08-19 RX ORDER — METRONIDAZOLE 500 MG
500 TABLET ORAL ONCE
Refills: 0 | Status: COMPLETED | OUTPATIENT
Start: 2019-08-19 | End: 2019-08-19

## 2019-08-19 RX ADMIN — SODIUM CHLORIDE 1000 MILLILITER(S): 9 INJECTION INTRAMUSCULAR; INTRAVENOUS; SUBCUTANEOUS at 09:29

## 2019-08-19 RX ADMIN — SODIUM CHLORIDE 1000 MILLILITER(S): 9 INJECTION INTRAMUSCULAR; INTRAVENOUS; SUBCUTANEOUS at 12:26

## 2019-08-19 RX ADMIN — IOHEXOL 30 MILLILITER(S): 300 INJECTION, SOLUTION INTRAVENOUS at 09:52

## 2019-08-19 RX ADMIN — CEFTRIAXONE 100 MILLIGRAM(S): 500 INJECTION, POWDER, FOR SOLUTION INTRAMUSCULAR; INTRAVENOUS at 12:24

## 2019-08-19 RX ADMIN — Medication 100 MILLIGRAM(S): at 12:54

## 2019-08-19 RX ADMIN — ONDANSETRON 4 MILLIGRAM(S): 8 TABLET, FILM COATED ORAL at 09:29

## 2019-08-19 RX ADMIN — SODIUM CHLORIDE 1000 MILLILITER(S): 9 INJECTION INTRAMUSCULAR; INTRAVENOUS; SUBCUTANEOUS at 12:54

## 2019-08-19 NOTE — ED PROVIDER NOTE - CLINICAL SUMMARY MEDICAL DECISION MAKING FREE TEXT BOX
75 y/o F with PMHx of SBO s/p colostomy and reversal presents to the ED with acute vomiting, diarrhea, nausea, and abdominal pain. Pt is afebrile and HDS. Labs reviewed of leucocytosis to 17, mild hyponatremia to 147, elevated AG to 18, and lactate of 2.1 Will treat with antibiotics for possible sepsis. Pt also treated with IV fluid bolus for dehydration. Will arrange for CT abdomen and pelvis to r/o colitis/diverticulitis. Will continue to monitor. 75 y/o F with PMHx of SBO s/p colostomy and reversal presents to the ED with acute vomiting, diarrhea, nausea, and abdominal pain. Pt is afebrile and HDS. Labs reviewed with findings of leucocytosis to 17, mild hypernatremia to 147, elevated AG to 18, and lactate of 2.1 Will treat with antibiotics for possible sepsis. Pt also treated with IV fluid bolus for dehydration. Will arrange for CT abdomen and pelvis to r/o colitis/diverticulitis. Will continue to monitor.

## 2019-08-19 NOTE — ED ADULT NURSE NOTE - OBJECTIVE STATEMENT
73 y/o female c/o nausea and vomiting since last night after eating blueberries and strawberries. pt reports the emesis was dark in color. hx of colostomy s/p bowel obstruction 1 year ago, colostomy has since been reversed. pt reports passing gas today as well as a soft BM this morning. pt states she feels more distended than usual and that her abdomen feels slightly more firm than usual as well. denies any fever/chills, chest pain, sob. pt denies abdominal pain at this time but states she had lower abdominal pain last night when she was vomiting.

## 2019-08-19 NOTE — ED PROVIDER NOTE - PROGRESS NOTE DETAILS
CT results reviewed with findings of ileus, no evidence for SBO, positive colitis. Case discussed with Dr. Murphy, will d/c home. Pt to take antibiotics as OP and will f/o with surgery for further evaluation. CT results reviewed with findings of ileus, no evidence for SBO, + colitis. Case discussed with Dr. Murphy, will d/c home. Pt to take antibiotics as op and will f/u with surgery for re-evaluation. ED evaluation and management discussed with the patient in detail.  Close PMD and surg follow up encouraged.  Strict ED return instructions discussed in detail and patient given the opportunity to ask any questions about their discharge diagnosis and instructions. Patient verbalized understanding.

## 2019-08-19 NOTE — ED PROVIDER NOTE - CARE PROVIDER_API CALL
Elke Green)  ColonRectal Surgery; Surgery  [address] [place] [address], [address]  [place] [address]  Phone: [phone]  Fax: [phone]  Follow Up Time:

## 2019-08-19 NOTE — ED PROVIDER NOTE - OBJECTIVE STATEMENT
73 y/o F with PSHx of knee surgery, appendectomy, hysterectomy and PMHx of HLD, herniated disc, stent, and s/p reverse colostomy last October presents to the ED with complaints of emesis and vomiting x1 day. Pt states that she ate blueberries and strawberries last night when she had 4-5 episodes of dark, but non-bloody emesis. Pt reports associated diffuse abdominal pain at the time which was resolved with emesis episodes. Since this morning, pt has noted x1-2 episodes of non-bloody loose stool, so decided to come to the ED. Of note, pt also has complaints of subjective fever for a few days, intermittent cough over past few months, and painful breast swelling. Denies SOB, chest pain, or urinary symptoms. Of note, pt has a significant family cardiac history, but has never had an MI herself. Pt's cardiologist is Dr. Overton. Pt currently takes baby aspirin 3 times a week, Atorvastatin, Naproxen, and Voltaren. 75 y/o F with h/o knee surgery, appendectomy, hysterectomy, HLD, herniated disc, stent, sbo s/p colostomy and reversal last October presents to the ED with complaints of emesis and vomiting x1 day. Pt states that she ate blueberries and strawberries last night when she had 4-5 episodes of dark, but non-bloody emesis. Pt reports associated lower abdominal pain at the time which was resolved with emesis episodes. Since this morning, pt has noted x1-2 episodes of non-bloody loose stool, so decided to come to the ED. Of note, pt also has complaints of subjective fever for a few days, intermittent cough over past few months. Denies SOB, chest pain, or urinary symptoms.

## 2019-08-19 NOTE — ED PROVIDER NOTE - NSFOLLOWUPINSTRUCTIONS_ED_ALL_ED_FT
Take augmentin twice daily and flagyl every 8 hours for the next 10 days. Drink plenty of fluids. Follow up with your surgeon for re-evaluation. Return to er for any new or worsening symptoms.     [facility]    Colitis  Image   Colitis is inflammation of the colon. Colitis may last a short time (acute) or it may last a long time (chronic).    What are the causes?  This condition may be caused by:  Viruses.  Bacteria.  Reactions to medicine.  Certain autoimmune diseases, such as Crohn disease or ulcerative colitis.  What are the signs or symptoms?  Symptoms of this condition include:  Diarrhea.  Passing bloody or tarry stool.  Pain.  Fever.  Vomiting.  Tiredness (fatigue).  Weight loss.  Bloating.  Sudden increase in abdominal pain.  Having fewer bowel movements than usual.  How is this diagnosed?  This condition is diagnosed with a stool test or a blood test. You may also have other tests, including X-rays, a CT scan, or a colonoscopy.    How is this treated?  Treatment may include:  Resting the bowel. This involves not eating or drinking for a period of time.  Fluids that are given through an IV tube.  Medicine for pain and diarrhea.  Antibiotic medicines.  Cortisone medicines.  Surgery.  Follow these instructions at home:  Eating and drinking     Follow instructions from your health care provider about eating or drinking restrictions.  Drink enough fluid to keep your urine clear or pale yellow.  Work with a dietitian to determine which foods cause your condition to flare up.  Avoid foods that cause flare-ups.  Eat a well-balanced diet.  Medicines     Take over-the-counter and prescription medicines only as told by your health care provider.  If you were prescribed an antibiotic medicine, take it as told by your health care provider. Do not stop taking the antibiotic even if you start to feel better.  General instructions     Keep all follow-up visits as told by your health care provider. This is important.  Contact a health care provider if:  Your symptoms do not go away.  You develop new symptoms.  Get help right away if:  You have a fever that does not go away with treatment.  You develop chills.  You have extreme weakness, fainting, or dehydration.  You have repeated vomiting.  You develop severe pain in your abdomen.  You pass bloody or tarry stool.  This information is not intended to replace advice given to you by your health care provider. Make sure you discuss any questions you have with your health care provider.    Document Released: 01/25/2006 Document Revised: 05/25/2017 Document Reviewed: 04/11/2016  [employer] Interactive Patient Education © 2019 [employer]      Log Out.    Eligible CareNotes®     :  [facility]             ILEUS - AfterCare(R) Instructions(ER/ED)     Ileus    WHAT YOU NEED TO KNOW:    Ileus is a condition that develops when the muscles of your intestines stop yris. This causes a blockage that prevents food and waste from passing through normally.     DISCHARGE INSTRUCTIONS:    Medicines:     Laxatives may help your intestines contract again and soften your bowel movement to make it easier to pass.       Take your medicine as directed. Contact your healthcare provider if you think your medicine is not helping or if you have side effects. Tell him of her if you are allergic to any medicine. Keep a list of the medicines, vitamins, and herbs you take. Include the amounts, and when and why you take them. Bring the list or the pill bottles to follow-up visits. Carry your medicine list with you in case of an emergency.    Follow up with your healthcare provider as directed: Write down your questions so you remember to ask them during your visits.    Contact your healthcare provider if:     You have nausea or are vomiting.      You cannot pass gas or a bowel movement.      You have abdominal bloating or pain that does not go away.      You cannot eat.      You have questions or concerns about your condition or care.    Return to the emergency department if:     You have a fever and severe abdominal pain or bloating.      You have blood in your bowel movement.      Your heart is pounding or racing.         © Copyright [employer] 2019 All illustrations and images included in CareNotes are the copyrighted property of [employer] or [employer] [place].      back to top                      © Copyright [employer] 2019

## 2019-08-19 NOTE — ED ADULT NURSE NOTE - NSIMPLEMENTINTERV_GEN_ALL_ED
Implemented All Universal Safety Interventions:  [place] to call system. Call bell, personal items and telephone within reach. Instruct patient to call for assistance. Room bathroom lighting operational. Non-slip footwear when patient is off stretcher. Physically safe environment: no spills, clutter or unnecessary equipment. Stretcher in lowest position, wheels locked, appropriate side rails in place.

## 2019-08-19 NOTE — ED PROVIDER NOTE - PHYSICAL EXAMINATION
VITAL SIGNS: I have reviewed nursing notes and confirm.  CONSTITUTIONAL: Well-developed; well-nourished; in no acute distress.   SKIN:  warm and dry, no acute rash.   HEAD:  normocephalic, atraumatic.  EYES: PERRL, EOM intact; conjunctiva and sclera clear.  ENT: No nasal discharge; airway clear. Mildly dry mucous membranes  NECK: Supple; non tender.  CARD: S1, S2 normal; no murmurs, gallops, or rubs. Regular rate and rhythm.   RESP:  Clear to auscultation b/l, no wheezes, rales or rhonchi.  ABD: Normal bowel sounds; soft; non-distended. Mild LLQ tenderness, no guarding or rebound  : No CVA tenderness  EXT: Normal ROM. No clubbing, cyanosis or edema. 2+ pulses to b/l ue/le.  NEURO: Alert, oriented, grossly unremarkable  PSYCH: Cooperative, mood and affect appropriate. VITAL SIGNS: I have reviewed nursing notes and confirm.  CONSTITUTIONAL: Well-developed; well-nourished; in no acute distress.   SKIN:  warm and dry, no acute rash.   HEAD:  normocephalic, atraumatic.  EYES: EOM intact; conjunctiva and sclera clear.  ENT: No nasal discharge; airway clear. Mildly dry mucous membranes  NECK: Supple; non tender.  CARD: S1, S2 normal; no murmurs, gallops, or rubs. Regular rate and rhythm.   RESP:  Clear to auscultation b/l, no wheezes, rales or rhonchi.  ABD: Normal bowel sounds; soft; non-distended. Mild LLQ tenderness, no guarding or rebound. No masses.   : No CVA tenderness  EXT: Normal ROM. No clubbing, cyanosis or edema. 2+ pulses to b/l ue/le.  NEURO: Alert, oriented, grossly unremarkable  PSYCH: Cooperative, mood and affect appropriate.

## 2019-08-24 DIAGNOSIS — R19.7 DIARRHEA, UNSPECIFIED: ICD-10-CM

## 2019-08-24 DIAGNOSIS — K56.7 ILEUS, UNSPECIFIED: ICD-10-CM

## 2019-08-24 DIAGNOSIS — K52.9 NONINFECTIVE GASTROENTERITIS AND COLITIS, UNSPECIFIED: ICD-10-CM

## 2019-08-24 LAB
CULTURE RESULTS: SIGNIFICANT CHANGE UP
CULTURE RESULTS: SIGNIFICANT CHANGE UP
SPECIMEN SOURCE: SIGNIFICANT CHANGE UP
SPECIMEN SOURCE: SIGNIFICANT CHANGE UP

## 2019-11-04 ENCOUNTER — RX RENEWAL (OUTPATIENT)
Age: 74
End: 2019-11-04

## 2019-11-05 ENCOUNTER — RX RENEWAL (OUTPATIENT)
Age: 74
End: 2019-11-05

## 2019-11-12 ENCOUNTER — APPOINTMENT (OUTPATIENT)
Dept: HEART AND VASCULAR | Facility: CLINIC | Age: 74
End: 2019-11-12
Payer: MEDICARE

## 2019-11-12 VITALS
HEART RATE: 61 BPM | HEIGHT: 65 IN | SYSTOLIC BLOOD PRESSURE: 162 MMHG | TEMPERATURE: 97.7 F | WEIGHT: 150 LBS | DIASTOLIC BLOOD PRESSURE: 90 MMHG | OXYGEN SATURATION: 98 % | BODY MASS INDEX: 24.99 KG/M2

## 2019-11-12 DIAGNOSIS — I25.10 ATHEROSCLEROTIC HEART DISEASE OF NATIVE CORONARY ARTERY W/OUT ANGINA PECTORIS: ICD-10-CM

## 2019-11-12 DIAGNOSIS — R00.2 PALPITATIONS: ICD-10-CM

## 2019-11-12 PROCEDURE — 93000 ELECTROCARDIOGRAM COMPLETE: CPT

## 2019-11-12 PROCEDURE — G0008: CPT

## 2019-11-12 PROCEDURE — 99214 OFFICE O/P EST MOD 30 MIN: CPT

## 2019-11-12 NOTE — ASSESSMENT
[FreeTextEntry1] : HPL - stable and check labs\par CAD - stable and check labs\par Palpitations - this is a new problem.  Will check lytes and apply Ziopatch

## 2019-11-12 NOTE — REASON FOR VISIT
[Coronary Artery Disease] : coronary artery disease [Hyperlipidemia] : hyperlipidemia [Medication Management] : Medication management [Palpitations] : palpitations [FreeTextEntry1] : She is seeing an orthopod tomorrow for possible THR.  Last week she had <1 minute episode of palpitaton - a racing HR with no dizziness or syncope.  Had a less severe and briefer event last night.\par \par EKG is NSR and non-ischemic.

## 2019-11-12 NOTE — PHYSICAL EXAM
[General Appearance - Well Developed] : well developed [Normal Appearance] : normal appearance [General Appearance - Well Nourished] : well nourished [Well Groomed] : well groomed [General Appearance - In No Acute Distress] : no acute distress [No Deformities] : no deformities [Normal Conjunctiva] : the conjunctiva exhibited no abnormalities [Eyelids - No Xanthelasma] : the eyelids demonstrated no xanthelasmas [Normal Oral Mucosa] : normal oral mucosa [No Oral Pallor] : no oral pallor [No Oral Cyanosis] : no oral cyanosis [Normal Jugular Venous A Waves Present] : normal jugular venous A waves present [Normal Jugular Venous V Waves Present] : normal jugular venous V waves present [No Jugular Venous Ricks A Waves] : no jugular venous ricks A waves [Respiration, Rhythm And Depth] : normal respiratory rhythm and effort [Exaggerated Use Of Accessory Muscles For Inspiration] : no accessory muscle use [Auscultation Breath Sounds / Voice Sounds] : lungs were clear to auscultation bilaterally [Heart Rate And Rhythm] : heart rate and rhythm were normal [Abdomen Soft] : soft [Heart Sounds] : normal S1 and S2 [Murmurs] : no murmurs present [Abdomen Tenderness] : non-tender [Abdomen Mass (___ Cm)] : no abdominal mass palpated [Nail Clubbing] : no clubbing of the fingernails [Abnormal Walk] : normal gait [Gait - Sufficient For Exercise Testing] : the gait was sufficient for exercise testing [Petechial Hemorrhages (___cm)] : no petechial hemorrhages [Cyanosis, Localized] : no localized cyanosis [Skin Color & Pigmentation] : normal skin color and pigmentation [] : no ischemic changes [Skin Lesions] : no skin lesions [No Skin Ulcers] : no skin ulcer [No Venous Stasis] : no venous stasis [Oriented To Time, Place, And Person] : oriented to person, place, and time [No Xanthoma] : no  xanthoma was observed [Mood] : the mood was normal [No Anxiety] : not feeling anxious [Affect] : the affect was normal

## 2019-11-14 LAB
ALBUMIN SERPL ELPH-MCNC: 4.6 G/DL
ALP BLD-CCNC: 110 U/L
ALT SERPL-CCNC: 19 U/L
ANION GAP SERPL CALC-SCNC: 16 MMOL/L
AST SERPL-CCNC: 20 U/L
BILIRUB SERPL-MCNC: 0.7 MG/DL
BUN SERPL-MCNC: 13 MG/DL
CALCIUM SERPL-MCNC: 9.5 MG/DL
CHLORIDE SERPL-SCNC: 105 MMOL/L
CHOLEST SERPL-MCNC: 201 MG/DL
CHOLEST/HDLC SERPL: 4.1 RATIO
CO2 SERPL-SCNC: 22 MMOL/L
CREAT SERPL-MCNC: 0.69 MG/DL
GLUCOSE SERPL-MCNC: 108 MG/DL
HDLC SERPL-MCNC: 49 MG/DL
LDLC SERPL CALC-MCNC: 107 MG/DL
POTASSIUM SERPL-SCNC: 4.3 MMOL/L
PROT SERPL-MCNC: 7 G/DL
SODIUM SERPL-SCNC: 143 MMOL/L
TRIGL SERPL-MCNC: 223 MG/DL

## 2019-11-26 ENCOUNTER — RX CHANGE (OUTPATIENT)
Age: 74
End: 2019-11-26

## 2020-01-27 NOTE — PROGRESS NOTE ADULT - PROBLEM/PLAN-1
-- DO NOT REPLY / DO NOT REPLY ALL --  -- Message is from the [facility]--    General Patient Message      Reason for Call: Patient is calling to see did PCP set up appointment for his wife to see him for her pain. Thank you.     Caller Information       Type Contact Phone    01/27/2020 02:44 PM Phone (Incoming) [address] (Self) [phone] (H)          Alternative phone number: [phone]    Turnaround time given to caller:   \"This message will be sent to [state Provider's name]. The clinical team will fulfill your request as soon as they review your message.\"}    
DISPLAY PLAN FREE TEXT

## 2020-01-28 ENCOUNTER — APPOINTMENT (OUTPATIENT)
Dept: HEART AND VASCULAR | Facility: CLINIC | Age: 75
End: 2020-01-28
Payer: MEDICARE

## 2020-01-28 VITALS
HEART RATE: 69 BPM | SYSTOLIC BLOOD PRESSURE: 134 MMHG | HEIGHT: 65 IN | TEMPERATURE: 96.4 F | WEIGHT: 145 LBS | DIASTOLIC BLOOD PRESSURE: 63 MMHG | OXYGEN SATURATION: 99 % | BODY MASS INDEX: 24.16 KG/M2

## 2020-01-28 PROCEDURE — 93000 ELECTROCARDIOGRAM COMPLETE: CPT

## 2020-01-28 PROCEDURE — 99214 OFFICE O/P EST MOD 30 MIN: CPT

## 2020-01-28 NOTE — PHYSICAL EXAM
[Well Groomed] : well groomed [General Appearance - Well Developed] : well developed [Normal Appearance] : normal appearance [General Appearance - Well Nourished] : well nourished [General Appearance - In No Acute Distress] : no acute distress [No Deformities] : no deformities [Normal Conjunctiva] : the conjunctiva exhibited no abnormalities [Eyelids - No Xanthelasma] : the eyelids demonstrated no xanthelasmas [Normal Oral Mucosa] : normal oral mucosa [No Oral Cyanosis] : no oral cyanosis [No Oral Pallor] : no oral pallor [Normal Jugular Venous A Waves Present] : normal jugular venous A waves present [Normal Jugular Venous V Waves Present] : normal jugular venous V waves present [No Jugular Venous Ricks A Waves] : no jugular venous ricks A waves [Respiration, Rhythm And Depth] : normal respiratory rhythm and effort [Auscultation Breath Sounds / Voice Sounds] : lungs were clear to auscultation bilaterally [Exaggerated Use Of Accessory Muscles For Inspiration] : no accessory muscle use [Heart Rate And Rhythm] : heart rate and rhythm were normal [Heart Sounds] : normal S1 and S2 [Abdomen Tenderness] : non-tender [Abdomen Soft] : soft [Murmurs] : no murmurs present [Abdomen Mass (___ Cm)] : no abdominal mass palpated [Abnormal Walk] : normal gait [Gait - Sufficient For Exercise Testing] : the gait was sufficient for exercise testing [Nail Clubbing] : no clubbing of the fingernails [Cyanosis, Localized] : no localized cyanosis [Petechial Hemorrhages (___cm)] : no petechial hemorrhages [] : no rash [Skin Color & Pigmentation] : normal skin color and pigmentation [No Xanthoma] : no  xanthoma was observed [No Venous Stasis] : no venous stasis [Skin Lesions] : no skin lesions [No Skin Ulcers] : no skin ulcer [Affect] : the affect was normal [Oriented To Time, Place, And Person] : oriented to person, place, and time [Mood] : the mood was normal [No Anxiety] : not feeling anxious

## 2020-01-28 NOTE — REVIEW OF SYSTEMS
[Joint Stiffness] : joint stiffness [see HPI] : see HPI [Joint Pain] : joint pain [Negative] : Heme/Lymph

## 2020-01-28 NOTE — DISCUSSION/SUMMARY
[As per surgery] : as per surgery [Optimized for Surgery] : the patient is optimized for surgery [Procedure Low Risk] : the procedure risk is low [Continue] : Continue medications as currently directed

## 2020-01-28 NOTE — HISTORY OF PRESENT ILLNESS
[Scheduled Procedure ___] : a [unfilled] [Preoperative Visit] : for a medical evaluation prior to surgery [Date of Surgery ___] : on [unfilled] [Fever] : no fever [Chills] : no chills [Chest Pain] : no chest pain [Fatigue] : no fatigue [Dyspnea] : no dyspnea [Cough] : no cough [Dysuria] : no dysuria [Urinary Frequency] : no urinary frequency [Diarrhea] : no diarrhea [Nausea] : no nausea [Vomiting] : no vomiting [Abdominal Pain] : no abdominal pain [Lower Extremity Swelling] : no lower extremity swelling [Easy Bruising] : no easy bruising [Poor Exercise Tolerance] : no poor exercise tolerance [Diabetes] : no diabetes [Cardiovascular Disease] : cardiovascular disease [Pulmonary Disease] : no pulmonary disease [Alcohol Use] : no  alcohol use [Anti-Platelet Agents] : anti-platelet agents [Nicotine Dependence] : no nicotine dependence [Sleep Apnea] : no sleep apnea [Renal Disease] : no renal disease [GI Disease] : no gastrointestinal disease [Thromboembolic Problems] : no thromboembolic problems [Frequent use of NSAIDs] : no use of NSAIDs [Transfusion Reaction] : no transfusion reaction [Impaired Immunity] : no impaired immunity [Steroid Use in Last 6 Months] : no steroid use in the last six months [Prior Anesthesia] : Prior anesthesia [Frequent Aspirin Use] : frequent aspirin use [Cardiovascular Stress Test] : a cardiac stress test ~T ~C was performed [Prev Anesthesia Reaction] : no previous anesthesia reaction [Electrocardiogram] : ~T an ECG ~C was performed [Anesthesia Reaction] : no anesthesia reaction [Sudden Death] : no sudden death [Good] : Good [Clotting Disorder] : no clotting disorder [Bleeding Disorder] : no bleeding disorder [FreeTextEntry1] : Cardiac status stable for many years.  Recently developed some palpitations due to APCs - this has been completely resolved with metoprolol 25 od.\par \par She has no chest pain and no history of HF [de-identified] : HSS

## 2020-01-28 NOTE — ASSESSMENT
[FreeTextEntry1] : From a CV view, she is stable and cleared for THR. She has been instructed to take metoprolol on the morning of surgery with a sip of water.

## 2020-04-06 ENCOUNTER — RX RENEWAL (OUTPATIENT)
Age: 75
End: 2020-04-06

## 2020-06-19 ENCOUNTER — APPOINTMENT (OUTPATIENT)
Dept: HEART AND VASCULAR | Facility: CLINIC | Age: 75
End: 2020-06-19
Payer: MEDICARE

## 2020-06-19 PROCEDURE — 99442: CPT | Mod: 95

## 2020-06-19 NOTE — REASON FOR VISIT
[Coronary Artery Disease] : coronary artery disease [Hyperlipidemia] : hyperlipidemia [Medication Management] : Medication management [Palpitations] : palpitations [FreeTextEntry1] : She is seeing an orthopod tomorrow for possible THR.  Last week she had <1 minute episode of palpitaton - a racing HR with no dizziness or syncope.  Had a less severe and briefer event last night.\par \par EKG is NSR and non-ischemic.\par \par 6/19/20  Telemed for renewal of Ambien

## 2020-06-19 NOTE — PHYSICAL EXAM
[General Appearance - Well Developed] : well developed [Normal Appearance] : normal appearance [Well Groomed] : well groomed [General Appearance - Well Nourished] : well nourished [No Deformities] : no deformities [General Appearance - In No Acute Distress] : no acute distress [Normal Conjunctiva] : the conjunctiva exhibited no abnormalities [Eyelids - No Xanthelasma] : the eyelids demonstrated no xanthelasmas [Normal Oral Mucosa] : normal oral mucosa [No Oral Pallor] : no oral pallor [No Oral Cyanosis] : no oral cyanosis [Normal Jugular Venous A Waves Present] : normal jugular venous A waves present [Normal Jugular Venous V Waves Present] : normal jugular venous V waves present [No Jugular Venous Ricks A Waves] : no jugular venous ricks A waves [Respiration, Rhythm And Depth] : normal respiratory rhythm and effort [Exaggerated Use Of Accessory Muscles For Inspiration] : no accessory muscle use [Auscultation Breath Sounds / Voice Sounds] : lungs were clear to auscultation bilaterally [Heart Rate And Rhythm] : heart rate and rhythm were normal [Murmurs] : no murmurs present [Heart Sounds] : normal S1 and S2 [Abdomen Soft] : soft [Abdomen Tenderness] : non-tender [Abdomen Mass (___ Cm)] : no abdominal mass palpated [Abnormal Walk] : normal gait [Gait - Sufficient For Exercise Testing] : the gait was sufficient for exercise testing [Nail Clubbing] : no clubbing of the fingernails [Cyanosis, Localized] : no localized cyanosis [Petechial Hemorrhages (___cm)] : no petechial hemorrhages [Skin Color & Pigmentation] : normal skin color and pigmentation [] : no rash [No Venous Stasis] : no venous stasis [Skin Lesions] : no skin lesions [No Skin Ulcers] : no skin ulcer [No Xanthoma] : no  xanthoma was observed [Oriented To Time, Place, And Person] : oriented to person, place, and time [Affect] : the affect was normal [Mood] : the mood was normal [No Anxiety] : not feeling anxious [FreeTextEntry1] : no exam 6/19/20 Telemed

## 2020-12-22 ENCOUNTER — RX CHANGE (OUTPATIENT)
Age: 75
End: 2020-12-22

## 2021-01-26 NOTE — PHYSICAL THERAPY INITIAL EVALUATION ADULT - BED MOBILITY LIMITATIONS, REHAB EVAL
LMTCB to review NP responses.   decreased ability to use legs for bridging/pushing/decreased ability to use arms for pushing/pulling/impaired ability to control trunk for mobility

## 2021-08-20 ENCOUNTER — EMERGENCY (EMERGENCY)
Facility: HOSPITAL | Age: 76
LOS: 1 days | Discharge: HOME | End: 2021-08-20
Attending: EMERGENCY MEDICINE | Admitting: EMERGENCY MEDICINE
Payer: MEDICARE

## 2021-08-20 VITALS
RESPIRATION RATE: 18 BRPM | OXYGEN SATURATION: 95 % | DIASTOLIC BLOOD PRESSURE: 80 MMHG | HEIGHT: 66 IN | SYSTOLIC BLOOD PRESSURE: 115 MMHG | HEART RATE: 89 BPM | TEMPERATURE: 99 F

## 2021-08-20 VITALS
HEART RATE: 62 BPM | TEMPERATURE: 98 F | DIASTOLIC BLOOD PRESSURE: 99 MMHG | OXYGEN SATURATION: 95 % | SYSTOLIC BLOOD PRESSURE: 167 MMHG | RESPIRATION RATE: 18 BRPM

## 2021-08-20 DIAGNOSIS — S50.312A ABRASION OF LEFT ELBOW, INITIAL ENCOUNTER: ICD-10-CM

## 2021-08-20 DIAGNOSIS — Z90.49 ACQUIRED ABSENCE OF OTHER SPECIFIED PARTS OF DIGESTIVE TRACT: Chronic | ICD-10-CM

## 2021-08-20 DIAGNOSIS — Z90.710 ACQUIRED ABSENCE OF BOTH CERVIX AND UTERUS: ICD-10-CM

## 2021-08-20 DIAGNOSIS — Z98.890 OTHER SPECIFIED POSTPROCEDURAL STATES: ICD-10-CM

## 2021-08-20 DIAGNOSIS — Z98.890 OTHER SPECIFIED POSTPROCEDURAL STATES: Chronic | ICD-10-CM

## 2021-08-20 DIAGNOSIS — Z20.822 CONTACT WITH AND (SUSPECTED) EXPOSURE TO COVID-19: ICD-10-CM

## 2021-08-20 DIAGNOSIS — I25.10 ATHEROSCLEROTIC HEART DISEASE OF NATIVE CORONARY ARTERY WITHOUT ANGINA PECTORIS: ICD-10-CM
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
## 2021-08-20 DIAGNOSIS — D32.9 BENIGN NEOPLASM OF MENINGES, UNSPECIFIED: ICD-10-CM

## 2021-08-20 DIAGNOSIS — S80.211A ABRASION, RIGHT KNEE, INITIAL ENCOUNTER: ICD-10-CM

## 2021-08-20 DIAGNOSIS — E78.00 PURE HYPERCHOLESTEROLEMIA, UNSPECIFIED: ICD-10-CM

## 2021-08-20 DIAGNOSIS — M25.551 PAIN IN RIGHT HIP: ICD-10-CM

## 2021-08-20 DIAGNOSIS — Z93.3 COLOSTOMY STATUS: Chronic | ICD-10-CM

## 2021-08-20 DIAGNOSIS — Z90.710 ACQUIRED ABSENCE OF BOTH CERVIX AND UTERUS: Chronic | ICD-10-CM

## 2021-08-20 DIAGNOSIS — M25.561 PAIN IN RIGHT KNEE: ICD-10-CM
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
## 2021-08-20 DIAGNOSIS — R00.1 BRADYCARDIA, UNSPECIFIED: ICD-10-CM

## 2021-08-20 DIAGNOSIS — Z90.49 ACQUIRED ABSENCE OF OTHER SPECIFIED PARTS OF DIGESTIVE TRACT: ICD-10-CM

## 2021-08-20 DIAGNOSIS — Z87.09 PERSONAL HISTORY OF OTHER DISEASES OF THE RESPIRATORY SYSTEM: ICD-10-CM

## 2021-08-20 LAB
ALBUMIN SERPL ELPH-MCNC: 4 G/DL — SIGNIFICANT CHANGE UP (ref 3.4–5)
ALP SERPL-CCNC: 85 U/L — SIGNIFICANT CHANGE UP (ref 40–120)
ALT FLD-CCNC: 23 U/L — SIGNIFICANT CHANGE UP (ref 12–42)
ANION GAP SERPL CALC-SCNC: 13 MMOL/L — SIGNIFICANT CHANGE UP (ref 9–16)
APTT BLD: 28.9 SEC — SIGNIFICANT CHANGE UP (ref 27.5–35.5)
AST SERPL-CCNC: 19 U/L — SIGNIFICANT CHANGE UP (ref 15–37)
BILIRUB SERPL-MCNC: 0.9 MG/DL — SIGNIFICANT CHANGE UP (ref 0.2–1.2)
BUN SERPL-MCNC: 25 MG/DL — HIGH (ref 7–23)
CALCIUM SERPL-MCNC: 9.1 MG/DL — SIGNIFICANT CHANGE UP (ref 8.5–10.5)
CHLORIDE SERPL-SCNC: 108 MMOL/L — SIGNIFICANT CHANGE UP (ref 96–108)
CO2 SERPL-SCNC: 24 MMOL/L — SIGNIFICANT CHANGE UP (ref 22–31)
CREAT SERPL-MCNC: 1.01 MG/DL — SIGNIFICANT CHANGE UP (ref 0.5–1.3)
GLUCOSE SERPL-MCNC: 159 MG/DL — HIGH (ref 70–99)
HCT VFR BLD CALC: 40.5 % — SIGNIFICANT CHANGE UP (ref 34.5–45)
HGB BLD-MCNC: 14 G/DL — SIGNIFICANT CHANGE UP (ref 11.5–15.5)
INR BLD: 1.07 — SIGNIFICANT CHANGE UP (ref 0.88–1.16)
MCHC RBC-ENTMCNC: 31.7 PG — SIGNIFICANT CHANGE UP (ref 27–34)
MCHC RBC-ENTMCNC: 34.6 GM/DL — SIGNIFICANT CHANGE UP (ref 32–36)
MCV RBC AUTO: 91.6 FL — SIGNIFICANT CHANGE UP (ref 80–100)
NRBC # BLD: 0 /100 WBCS — SIGNIFICANT CHANGE UP (ref 0–0)
PLATELET # BLD AUTO: 190 K/UL — SIGNIFICANT CHANGE UP (ref 150–400)
POTASSIUM SERPL-MCNC: 3.3 MMOL/L — LOW (ref 3.5–5.3)
POTASSIUM SERPL-SCNC: 3.3 MMOL/L — LOW (ref 3.5–5.3)
PROT SERPL-MCNC: 7.3 G/DL — SIGNIFICANT CHANGE UP (ref 6.4–8.2)
PROTHROM AB SERPL-ACNC: 12.8 SEC — SIGNIFICANT CHANGE UP (ref 10.6–13.6)
RBC # BLD: 4.42 M/UL — SIGNIFICANT CHANGE UP (ref 3.8–5.2)
RBC # FLD: 13.2 % — SIGNIFICANT CHANGE UP (ref 10.3–14.5)
SARS-COV-2 RNA SPEC QL NAA+PROBE: SIGNIFICANT CHANGE UP
SODIUM SERPL-SCNC: 145 MMOL/L — SIGNIFICANT CHANGE UP (ref 132–145)
WBC # BLD: 5.94 K/UL — SIGNIFICANT CHANGE UP (ref 3.8–10.5)
WBC # FLD AUTO: 5.94 K/UL — SIGNIFICANT CHANGE UP (ref 3.8–10.5)

## 2021-08-20 PROCEDURE — 73700 CT LOWER EXTREMITY W/O DYE: CPT | Mod: 26,RT,MH

## 2021-08-20 PROCEDURE — 73562 X-RAY EXAM OF KNEE 3: CPT | Mod: 26,RT

## 2021-08-20 PROCEDURE — 99284 EMERGENCY DEPT VISIT MOD MDM: CPT

## 2021-08-20 PROCEDURE — 93010 ELECTROCARDIOGRAM REPORT: CPT

## 2021-08-20 PROCEDURE — 73502 X-RAY EXAM HIP UNI 2-3 VIEWS: CPT | Mod: 26,RT

## 2021-08-20 RX ORDER — MORPHINE SULFATE 50 MG/1
2 CAPSULE, EXTENDED RELEASE ORAL ONCE
Refills: 0 | Status: DISCONTINUED | OUTPATIENT
Start: 2021-08-20 | End: 2021-08-20

## 2021-08-20 RX ORDER — KETOROLAC TROMETHAMINE 30 MG/ML
15 SYRINGE (ML) INJECTION ONCE
Refills: 0 | Status: DISCONTINUED | OUTPATIENT
Start: 2021-08-20 | End: 2021-08-20

## 2021-08-20 RX ORDER — POTASSIUM CHLORIDE 20 MEQ
40 PACKET (EA) ORAL ONCE
Refills: 0 | Status: COMPLETED | OUTPATIENT
Start: 2021-08-20 | End: 2021-08-20

## 2021-08-20 RX ORDER — SODIUM CHLORIDE 9 MG/ML
1000 INJECTION INTRAMUSCULAR; INTRAVENOUS; SUBCUTANEOUS
Refills: 0 | Status: DISCONTINUED | OUTPATIENT
Start: 2021-08-20 | End: 2021-08-24

## 2021-08-20 RX ADMIN — Medication 15 MILLIGRAM(S): at 23:10

## 2021-08-20 RX ADMIN — MORPHINE SULFATE 2 MILLIGRAM(S): 50 CAPSULE, EXTENDED RELEASE ORAL at 19:31

## 2021-08-20 RX ADMIN — SODIUM CHLORIDE 150 MILLILITER(S): 9 INJECTION INTRAMUSCULAR; INTRAVENOUS; SUBCUTANEOUS at 20:49

## 2021-08-20 RX ADMIN — Medication 40 MILLIEQUIVALENT(S): at 20:48

## 2021-08-20 NOTE — ED ADULT NURSE REASSESSMENT NOTE - NS ED NURSE REASSESS COMMENT FT1
Received pt from pervious RN. Pt resting, aox4, denies any acute pain or complaints. Small abrasion/bruising to right knee and slight redness to right hip noted. No obvious deformities. Full ROM to all extremities w/ pain to right hip w/ movement per pt.

## 2021-08-20 NOTE — ED ADULT NURSE NOTE - OBJECTIVE STATEMENT
Pt BIB EMS  c/o R hip pain and R knee pain s/p trip and fall [facility] denies LOC on baby aspirin " once in a while " in no acute distress claudine akins

## 2021-08-20 NOTE — ED PROVIDER NOTE - NSICDXPASTSURGICALHX_GEN_ALL_CORE_FT
PAST SURGICAL HISTORY:  Colostomy in place     History of appendectomy     History of craniotomy for menigioma, 2017    History of laparoscopy for endometriosis x 2    S/P EVERARDO (total abdominal hysterectomy)

## 2021-08-20 NOTE — ED PROVIDER NOTE - CARE PROVIDER_API CALL
Cristóbal Omalley)  Orthopaedic Surgery  [address] [place] [address], [address]  [place] [address]  Phone: [phone]  Fax: [phone]  Follow Up Time:

## 2021-08-20 NOTE — ED PROVIDER NOTE - CLINICAL SUMMARY MEDICAL DECISION MAKING FREE TEXT BOX
75 yo female with mechanical fall with R knee/thigh/elbow pain.  On initial assessment no evidence of deformity, bony tenderness.  Limited knee flexion ROM due to pain, no joint effusion present.  Knee x-ray unremarkable.  Hip x-ray with age indeterminate mildly displaced fracture.  Patient reassessed and now with R groin pain.  Endorses hx of R hip injury ~ 2 years ago and now ambulates with a cane.  States that she was told she needs a hip replacement but unsure of fracture history.  Reviewed with Dr. Barnard and ordered CT scan, labs, COVID testing, EKG.  Anticipate possible admission.

## 2021-08-20 NOTE — ED PROVIDER NOTE - PHYSICAL EXAMINATION
MSK:    No deformities or bony tenderness. superficial lateral epicondylar abrasion.  No pain elicited with supination/pronation of the forearm or flexion/extension at elbow.  Normal radial pulse, cap refill of the digits < 3 seconds.  FCR, FCU, ECR intact.  Sensation intact to light touch first webspace, lateral hand, and palm.     No deformities or bony tenderness.  + anterior knee abrasions.   No achilles tendon tenderness.  Negative squeeze test.  No pain elicited with inversion and eversion of the ankle.   Normal DP and PT pulses.  Cap refill < 3 seconds.  EHL, FHL, TA, GS intact.  Sensation intact to light touch T/S/S/SPN/DPN.  No shortening or rotation of the RLE

## 2021-08-20 NOTE — ED PROVIDER NOTE - PATIENT PORTAL LINK FT
You can access the FollowMyHealth Patient Portal offered by [facility] by registering at the following website: [email or web address]. By joining [employer]’s FollowMyHealth portal, you will also be able to view your health information using other applications (apps) compatible with our system.

## 2021-08-20 NOTE — ED PROVIDER NOTE - NSICDXFAMILYHX_GEN_ALL_CORE_FT
FAMILY HISTORY:  Mother  Still living? Unknown  Family history of chronic ischemic heart disease, Age at diagnosis: Age Unknown    Sibling  Still living? Unknown  Family history of chronic ischemic heart disease, Age at diagnosis: Age Unknown

## 2021-08-20 NOTE — ED PROVIDER NOTE - OBJECTIVE STATEMENT
BIBEMS after mechanical fall earlier today.  Denies premonitory symptoms.  Pain in her R knee, R leg, R elbow, and L hand.  Denies LOC.  bearing weight after the fall.  denies blood thinning medication use.  No headache or dizziness.

## 2021-08-20 NOTE — ED ADULT NURSE NOTE - NSIMPLEMENTINTERV_GEN_ALL_ED
Implemented All Fall Risk Interventions:  [place] to call system. Call bell, personal items and telephone within reach. Instruct patient to call for assistance. Room bathroom lighting operational. Non-slip footwear when patient is off stretcher. Physically safe environment: no spills, clutter or unnecessary equipment. Stretcher in lowest position, wheels locked, appropriate side rails in place. Provide visual cue, wrist band, yellow gown, etc. Monitor gait and stability. Monitor for mental status changes and reorient to person, place, and time. Review medications for side effects contributing to fall risk. Reinforce activity limits and safety measures with patient and family.

## 2021-08-20 NOTE — ED ADULT TRIAGE NOTE - CHIEF COMPLAINT QUOTE
Pt BIBA c/o mechanical fall. Endorsing pain to R knee, R hip, and L hand. Denies head trauma or loc. Takes baby aspirin daily.

## 2021-08-20 NOTE — ED ADULT NURSE NOTE - NSICDXPASTMEDICALHX_GEN_ALL_CORE_FT
PAST MEDICAL HISTORY:  Bowel obstruction     CAD (coronary artery disease) cardaic stent x 1    H/O pleural effusion     High cholesterol     Lyme disease     Meningioma     
(1) More than 48 hours/None

## 2021-09-03 ENCOUNTER — RX RENEWAL (OUTPATIENT)
Age: 76
End: 2021-09-03

## 2021-09-09 ENCOUNTER — APPOINTMENT (OUTPATIENT)
Dept: HEART AND VASCULAR | Facility: CLINIC | Age: 76
End: 2021-09-09
Payer: MEDICARE

## 2021-09-09 DIAGNOSIS — G47.00 INSOMNIA, UNSPECIFIED: ICD-10-CM

## 2021-09-09 DIAGNOSIS — E78.5 HYPERLIPIDEMIA, UNSPECIFIED: ICD-10-CM

## 2021-09-09 DIAGNOSIS — I25.10 ATHEROSCLEROTIC HEART DISEASE OF NATIVE CORONARY ARTERY W/OUT ANGINA PECTORIS: ICD-10-CM

## 2021-09-09 PROCEDURE — 99442: CPT | Mod: 95

## 2021-09-09 NOTE — REASON FOR VISIT
[Coronary Artery Disease] : coronary artery disease [Hyperlipidemia] : hyperlipidemia [Medication Management] : Medication management [Palpitations] : palpitations [Home] : at home, [unfilled] , at the time of the visit. [Medical Office: ([facility])___] : at the medical office located in  [Verbal consent obtained from patient] : the patient, [unfilled] [FreeTextEntry1] : She is seeing an orthopod tomorrow for possible THR.  Last week she had <1 minute episode of palpitaton - a racing HR with no dizziness or syncope.  Had a less severe and briefer event last night.\par \par EKG is NSR and non-ischemic.\par \par 6/19/20  Telemed for renewal of Ambien\par 9/9/21 Telemed , for renewal of Ambien

## 2021-09-09 NOTE — PHYSICAL EXAM
[General Appearance - Well Developed] : well developed [Normal Appearance] : normal appearance [Well Groomed] : well groomed [General Appearance - Well Nourished] : well nourished [No Deformities] : no deformities [General Appearance - In No Acute Distress] : no acute distress [Normal Conjunctiva] : the conjunctiva exhibited no abnormalities [Eyelids - No Xanthelasma] : the eyelids demonstrated no xanthelasmas [Normal Oral Mucosa] : normal oral mucosa [No Oral Pallor] : no oral pallor [No Oral Cyanosis] : no oral cyanosis [Normal Jugular Venous A Waves Present] : normal jugular venous A waves present [Normal Jugular Venous V Waves Present] : normal jugular venous V waves present [No Jugular Venous Ricks A Waves] : no jugular venous ricks A waves [Respiration, Rhythm And Depth] : normal respiratory rhythm and effort [Exaggerated Use Of Accessory Muscles For Inspiration] : no accessory muscle use [Auscultation Breath Sounds / Voice Sounds] : lungs were clear to auscultation bilaterally [Heart Rate And Rhythm] : heart rate and rhythm were normal [Heart Sounds] : normal S1 and S2 [Murmurs] : no murmurs present [Abdomen Soft] : soft [Abdomen Tenderness] : non-tender [Abdomen Mass (___ Cm)] : no abdominal mass palpated [Abnormal Walk] : normal gait [Gait - Sufficient For Exercise Testing] : the gait was sufficient for exercise testing [Nail Clubbing] : no clubbing of the fingernails [Cyanosis, Localized] : no localized cyanosis [Petechial Hemorrhages (___cm)] : no petechial hemorrhages [Skin Color & Pigmentation] : normal skin color and pigmentation [] : no rash [No Venous Stasis] : no venous stasis [Skin Lesions] : no skin lesions [No Skin Ulcers] : no skin ulcer [No Xanthoma] : no  xanthoma was observed [Oriented To Time, Place, And Person] : oriented to person, place, and time [Affect] : the affect was normal [Mood] : the mood was normal [No Anxiety] : not feeling anxious [FreeTextEntry1] : no exam  Telemed

## 2021-09-09 NOTE — ASSESSMENT
[FreeTextEntry1] : Coronary artery disease, angina presence unspecified, unspecified vessel or lesion\par type, unspecified whether native or transplanted heart (414.00) (I25.10)\par Insomnia (780.52) (G47.00)\par \par HPL - stable, recommend she come in especially if planning hip surgery \par CAD - stable\par Palpitations - this is a new problem. Will check lytes and apply Ziopatch. SVT documented on ZIO \par Hip pain- s/p fall in kitchen last year\par \par Insomnia- iSTOP 9/9/21 Ambien renewed.

## 2021-10-15 NOTE — DISCHARGE NOTE ADULT - PLAN OF CARE
normal (ped)... To continue antibiotic treatment as prescribed and follow up with your PCP after discharge. To continue your home medications as prescribed and follow You presented to the hospital with symptoms of nausea, vomiting, abdominal pain and loose bowel movements. You were admitted to our medicine floors for further management. You were managed with IV fluids, antibiotics and your diet was advanced as tolerated.   You had a CT abdomen pelvis done which showed nonspecific colitis which could be either secondary to ischemic colitis vs infectious or inflammatory colitis.   We had our surgery team follow you, as per their expert opinion they recommend colonoscopy after discharge for further evaluation.  This morning, your nausea, vomiting and diarrhea have resolved and you are tolerating a regualr diet.   You are stable to be discharged. You have a follow up appointment with Dr. Qureshi on July 5th at 7:30 am who may refer you to a gastroenterologist for further evaluation. To continue your home medications as prescribed and follow up with your PCP after discharge. You presented to the hospital with symptoms of nausea, vomiting, abdominal pain and loose bowel movements. You were admitted to our medicine floors for further management. You were managed with IV fluids, antibiotics and your diet was advanced as tolerated.   You had a CT abdomen pelvis done which showed nonspecific colitis which could be either secondary to ischemic colitis vs infectious or inflammatory colitis.   We had our surgery team follow you, as per their expert opinion they recommend colonoscopy after discharge for further evaluation.  This morning, your nausea, vomiting and diarrhea have resolved and you are tolerating a regualr diet.   You are stable to be discharged. Appointment with PCP You have a follow up appointment with Dr. Qureshi on July 5th at 7:30 am who may refer you to a gastroenterologist for further evaluation. You presented to the hospital with symptoms of nausea, vomiting, abdominal pain and loose bowel movements. You were admitted to our medicine floors for further management. You were managed with IV fluids, antibiotics and your diet was advanced as tolerated.   You had a CT abdomen pelvis done which showed nonspecific colitis which could be either secondary to ischemic colitis vs infectious or inflammatory colitis.   We had our surgery team follow you, as per their expert opinion they recommend colonoscopy after discharge for further evaluation.  This morning, your nausea, vomiting and diarrhea have resolved and you are tolerating a regualr diet.   You are stable to be discharged. Please take both metronidazole and ciprofloxacin every 8 hours for 3 more days.

## 2021-11-05 NOTE — PATIENT PROFILE ADULT. - URINARY CATHETER
Up as desired. Gradually resume light activity. 10 pound limit on lifting. May shower or bathe as desired. May resume driving when no longer taking narcotic pain medications.
no

## 2021-12-14 ENCOUNTER — APPOINTMENT (OUTPATIENT)
Dept: HEART AND VASCULAR | Facility: CLINIC | Age: 76
End: 2021-12-14

## 2022-02-07 NOTE — H&P ADULT - ASSESSMENT
74 yo F with extensive past medical and surgical history presenting with non-specific LLQ pain. Patient was recently discharged on 07/09 after loop colostomy creation.     Will admit to regional for Dr. Green  Adequate hydration  Low-residue diet  Home medication  DVT PPX  IS/OOB right hip ROM WFL maintaining anterior hip precautions./bilateral upper extremity Active ROM was WFL (within functional limits)/bilateral  lower extremity Active ROM was WFL (within functional limits)

## 2022-05-10 ENCOUNTER — APPOINTMENT (OUTPATIENT)
Dept: HEART AND VASCULAR | Facility: CLINIC | Age: 77
End: 2022-05-10
Payer: MEDICARE

## 2022-05-10 VITALS
HEART RATE: 65 BPM | HEIGHT: 65 IN | DIASTOLIC BLOOD PRESSURE: 80 MMHG | SYSTOLIC BLOOD PRESSURE: 130 MMHG | OXYGEN SATURATION: 98 % | BODY MASS INDEX: 24.82 KG/M2 | WEIGHT: 148.99 LBS

## 2022-05-10 PROCEDURE — 99214 OFFICE O/P EST MOD 30 MIN: CPT

## 2022-05-10 PROCEDURE — 93000 ELECTROCARDIOGRAM COMPLETE: CPT

## 2022-05-10 RX ORDER — OXYCODONE AND ACETAMINOPHEN 5; 325 MG/1; MG/1
5-325 TABLET ORAL
Qty: 42 | Refills: 0 | Status: DISCONTINUED | COMMUNITY
Start: 2017-09-03 | End: 2022-05-10

## 2022-05-10 RX ORDER — METOPROLOL SUCCINATE 25 MG/1
25 TABLET, EXTENDED RELEASE ORAL
Qty: 90 | Refills: 3 | Status: DISCONTINUED | COMMUNITY
Start: 2019-11-26 | End: 2022-05-10

## 2022-05-10 RX ORDER — ERYTHROMYCIN 5 MG/G
5 OINTMENT OPHTHALMIC
Qty: 4 | Refills: 0 | Status: DISCONTINUED | COMMUNITY
Start: 2016-10-25 | End: 2022-05-10

## 2022-05-10 RX ORDER — NITROGLYCERIN 0.4 MG/1
0.4 TABLET SUBLINGUAL
Qty: 30 | Refills: 3 | Status: DISCONTINUED | COMMUNITY
Start: 2019-03-25 | End: 2022-05-10

## 2022-05-10 NOTE — REASON FOR VISIT
[Coronary Artery Disease] : coronary artery disease [FreeTextEntry1] : No c/o CP SOB.  Lots of orthopedic issues.  Also having memory issues.\par \par EKG is normal

## 2022-05-10 NOTE — ASSESSMENT
[FreeTextEntry1] : No cardiac issues.  Clearly she has some mild to mod dementia.  Will see a neurologist.  Labs sent.

## 2022-05-11 LAB
ALBUMIN SERPL ELPH-MCNC: 4.7 G/DL
ALP BLD-CCNC: 101 U/L
ALT SERPL-CCNC: 20 U/L
ANION GAP SERPL CALC-SCNC: 14 MMOL/L
AST SERPL-CCNC: 18 U/L
BASOPHILS # BLD AUTO: 0.04 K/UL
BASOPHILS NFR BLD AUTO: 0.7 %
BILIRUB SERPL-MCNC: 0.5 MG/DL
BUN SERPL-MCNC: 15 MG/DL
CALCIUM SERPL-MCNC: 9.7 MG/DL
CHLORIDE SERPL-SCNC: 105 MMOL/L
CHOLEST SERPL-MCNC: 237 MG/DL
CO2 SERPL-SCNC: 23 MMOL/L
CREAT SERPL-MCNC: 0.67 MG/DL
EGFR: 90 ML/MIN/1.73M2
EOSINOPHIL # BLD AUTO: 0.1 K/UL
EOSINOPHIL NFR BLD AUTO: 1.7 %
ESTIMATED AVERAGE GLUCOSE: 114 MG/DL
GLUCOSE SERPL-MCNC: 101 MG/DL
HBA1C MFR BLD HPLC: 5.6 %
HCT VFR BLD CALC: 43.2 %
HDLC SERPL-MCNC: 49 MG/DL
HGB BLD-MCNC: 14.2 G/DL
IMM GRANULOCYTES NFR BLD AUTO: 0.2 %
LDLC SERPL CALC-MCNC: 120 MG/DL
LYMPHOCYTES # BLD AUTO: 1.44 K/UL
LYMPHOCYTES NFR BLD AUTO: 25 %
MAN DIFF?: NORMAL
MCHC RBC-ENTMCNC: 31.8 PG
MCHC RBC-ENTMCNC: 32.9 GM/DL
MCV RBC AUTO: 96.6 FL
MONOCYTES # BLD AUTO: 0.48 K/UL
MONOCYTES NFR BLD AUTO: 8.3 %
NEUTROPHILS # BLD AUTO: 3.69 K/UL
NEUTROPHILS NFR BLD AUTO: 64.1 %
NONHDLC SERPL-MCNC: 189 MG/DL
PLATELET # BLD AUTO: 220 K/UL
POTASSIUM SERPL-SCNC: 4.6 MMOL/L
PROT SERPL-MCNC: 7.1 G/DL
RBC # BLD: 4.47 M/UL
RBC # FLD: 14.6 %
SODIUM SERPL-SCNC: 143 MMOL/L
TRIGL SERPL-MCNC: 343 MG/DL
WBC # FLD AUTO: 5.76 K/UL

## 2022-05-11 RX ORDER — ATORVASTATIN CALCIUM 20 MG/1
20 TABLET, FILM COATED ORAL
Qty: 90 | Refills: 3 | Status: ACTIVE | COMMUNITY
Start: 2019-03-08 | End: 1900-01-01

## 2022-06-29 ENCOUNTER — APPOINTMENT (OUTPATIENT)
Dept: VASCULAR SURGERY | Facility: CLINIC | Age: 77
End: 2022-06-29

## 2022-06-29 ENCOUNTER — NON-APPOINTMENT (OUTPATIENT)
Age: 77
End: 2022-06-29

## 2022-06-29 VITALS
BODY MASS INDEX: 20.89 KG/M2 | SYSTOLIC BLOOD PRESSURE: 105 MMHG | WEIGHT: 130 LBS | HEIGHT: 66 IN | DIASTOLIC BLOOD PRESSURE: 73 MMHG | HEART RATE: 86 BPM

## 2022-06-29 DIAGNOSIS — Z78.9 OTHER SPECIFIED HEALTH STATUS: ICD-10-CM

## 2022-06-29 DIAGNOSIS — Z01.810 ENCOUNTER FOR PREPROCEDURAL CARDIOVASCULAR EXAMINATION: ICD-10-CM

## 2022-06-29 DIAGNOSIS — Z91.89 OTHER SPECIFIED PERSONAL RISK FACTORS, NOT ELSEWHERE CLASSIFIED: ICD-10-CM

## 2022-06-29 PROCEDURE — 93970 EXTREMITY STUDY: CPT

## 2022-06-29 PROCEDURE — 99203 OFFICE O/P NEW LOW 30 MIN: CPT

## 2022-06-29 RX ORDER — NAPROXEN 375 MG/1
375 TABLET ORAL
Qty: 60 | Refills: 0 | Status: DISCONTINUED | COMMUNITY
Start: 2016-12-15 | End: 2022-06-29

## 2022-06-29 RX ORDER — SODIUM FLUORIDE 6 MG/ML
1.1 PASTE, DENTIFRICE DENTAL
Qty: 100 | Refills: 0 | Status: DISCONTINUED | COMMUNITY
Start: 2017-05-01 | End: 2022-06-29

## 2022-06-29 RX ORDER — DICLOFENAC SODIUM 100 MG/1
100 TABLET, FILM COATED, EXTENDED RELEASE ORAL
Qty: 90 | Refills: 0 | Status: DISCONTINUED | COMMUNITY
Start: 2017-07-06 | End: 2022-06-29

## 2022-06-29 RX ORDER — CARISOPRODOL 350 MG/1
350 TABLET ORAL
Qty: 60 | Refills: 0 | Status: DISCONTINUED | COMMUNITY
Start: 2017-07-06 | End: 2022-06-29

## 2022-06-29 RX ORDER — BENZONATATE 100 MG/1
100 CAPSULE ORAL
Qty: 45 | Refills: 0 | Status: DISCONTINUED | COMMUNITY
Start: 2017-06-21 | End: 2022-06-29

## 2022-06-29 RX ORDER — ZOLPIDEM TARTRATE 5 MG/1
5 TABLET ORAL
Qty: 30 | Refills: 4 | Status: DISCONTINUED | COMMUNITY
Start: 2020-06-19 | End: 2022-06-29

## 2022-06-29 RX ORDER — ASPIRIN 81 MG/1
81 TABLET ORAL DAILY
Qty: 90 | Refills: 3 | Status: DISCONTINUED | COMMUNITY
Start: 2018-10-10 | End: 2022-06-29

## 2022-06-29 RX ORDER — OLOPATADINE HYDROCHLORIDE 2 MG/ML
0.2 SOLUTION OPHTHALMIC
Qty: 2 | Refills: 0 | Status: DISCONTINUED | COMMUNITY
Start: 2016-09-21 | End: 2022-06-29

## 2022-07-13 NOTE — CONSULT LETTER
[Dear  ___] : Dear  [unfilled], [FreeTextEntry2] : Sheldon Gutierrez Jr., MD\par [address]\[place] [address]\par \par Jose Irving MD\par [address]\[place] [address] [FreeTextEntry1] : Thank you fro referring Ms Joel Encarnacion for evaluation. She reports having a hip injury in 2019. Hip surgery was scheduled for 2020 however, due to the pandemic the procedure was cancelled. She explains she is now ready to proceed and needs clearance. Denies any history of bleeding or clotting disorders, DVT/PE, rest pain, wounds, swelling or leg coolness. She explains an CUCO/PVR has been requested. \par \par On exam, legs warm, well perfused with palpable femoral, popliteal and pedal pulses bilaterally. No edema. Skin intact. Blood pressure 105/73, heart rate 86 b/min.\par \par Venous duplex today showed no evidence of deep or superficial thrombosis bilaterally. \par \par Ms Encarnacion is cleared to proceed with hip surgery from a vascular perspective. She has normal arterial circulation with strong palpable pulses throughout and no evidence of venous thrombosis. CUCO/PVR is not indicated in the presence of palpable strong pulses. She may followup as needed. \par \par Warm regards.  \par \par My complete EMR office note is below for your records.  [FreeTextEntry3] : Sincerely, \par \par Domingo Campos M.D. \par , Surgical Services [facility]\par , Department of Surgery [facility]\par Professor of Surgery, [employer] at [employer]

## 2022-07-13 NOTE — ADDENDUM
[FreeTextEntry1] : I, Dr. Domingo Campos, personally performed the evaluation and management (E/M) services for this new patient.  That E/M includes conducting the initial examination, assessing all conditions, and establishing the plan of care.  Today, my ACP, Renu Sumner NP, was here to observe my evaluation and management services for this patient to be followed going forward. \par \par The documentation for this encounter was entered by Ahmet Xiong acting as a scribe for Dr.Gary Campos.\par

## 2022-07-13 NOTE — ASSESSMENT
[FreeTextEntry1] : 78 y/o F who is scheduled for hip replacement and presents for vascular clearance and circulation evaluation. On exam, LEs well perfused with palpable fem, pop and pedal pulses bilaterally. Skin intact. No leg edema. Bilateral LE venous duplex with no evidence of DVT. Pt has adequate circulation to LEs with no evidence of venous disease. CUCO/PVRs not indicated as pt has strong palpable pulses. Pt cleared to proceed with hip surgery from a vascular stand-point. She may f/u as needed.

## 2022-07-13 NOTE — PHYSICAL EXAM
[2+] : left 2+ [Ankle Swelling (On Exam)] : not present [Varicose Veins Of Lower Extremities] : not present [] : not present [de-identified] : WN/WD, uses a walker  [FreeTextEntry1] : No LE edema present. Feet warm to touch and appropriate capillary refill. No skin breakdown.\par  [de-identified] : FROM, ambulates with walker
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
## 2022-07-20 NOTE — DISCHARGE NOTE ADULT - NS MD DC FALL RISK RISK
For information on Fall & Injury Prevention, visit [email or web address]

## 2022-08-18 ENCOUNTER — APPOINTMENT (OUTPATIENT)
Dept: NEUROLOGY | Facility: CLINIC | Age: 77
End: 2022-08-18

## 2022-08-18 ENCOUNTER — NON-APPOINTMENT (OUTPATIENT)
Age: 77
End: 2022-08-18

## 2022-08-18 VITALS
OXYGEN SATURATION: 97 % | TEMPERATURE: 98.3 F | BODY MASS INDEX: 23.14 KG/M2 | DIASTOLIC BLOOD PRESSURE: 79 MMHG | SYSTOLIC BLOOD PRESSURE: 125 MMHG | WEIGHT: 144 LBS | HEIGHT: 66 IN | HEART RATE: 71 BPM

## 2022-08-18 DIAGNOSIS — R41.3 OTHER AMNESIA: ICD-10-CM

## 2022-08-18 PROCEDURE — 99205 OFFICE O/P NEW HI 60 MIN: CPT

## 2022-08-18 RX ORDER — MEMANTINE HYDROCHLORIDE 5 MG/1
5 TABLET, FILM COATED ORAL
Qty: 60 | Refills: 4 | Status: ACTIVE | COMMUNITY
Start: 2022-08-18 | End: 1900-01-01

## 2022-11-08 ENCOUNTER — APPOINTMENT (OUTPATIENT)
Dept: HEART AND VASCULAR | Facility: CLINIC | Age: 77
End: 2022-11-08

## 2022-11-11 NOTE — H&P ADULT - PMH
Fever started today. Pt was at [facility] on 10/25/22 ED for conjuntivitis and uri.   Last Tylenol was at 3pm.  Last Motrin was at 12pm.
CAD (coronary artery disease)    High cholesterol    Lyme disease    Meningioma

## 2022-11-11 NOTE — DISCHARGE NOTE ADULT - NS MD DC PLAN IMMU FLU REF OTH
What Type Of Note Output Would You Prefer (Optional)?: Standard Output How Severe Is Your Rash?: severe Is This A New Presentation, Or A Follow-Up?: Rash Out of season

## 2022-11-17 NOTE — DIETITIAN INITIAL EVALUATION ADULT. - INDICATOR
Care Transitions Follow Up Call    Challenges to be reviewed by the provider   Additional needs identified to be addressed with provider: no  none           Method of communication with provider : none    Care Transition Nurse (CTN) contacted the patient by telephone to follow up after admission on 11/3/22. Verified name and  with patient as identifiers. Addressed changes since last contact:  mri of abd ordered and  scheduled for 22  Follow up appointment completed? yes. Was follow up appointment scheduled within 7 days of discharge? yes. Advance Care Planning:   Does patient have an Advance Directive:  currently not on file; patient declined education    CTN reviewed discharge instructions, medical action plan and red flags with patient and discussed any barriers to care and/or understanding of plan of care after discharge. Discussed appropriate site of care based on symptoms and resources available to patient including: PCP. The patient agrees to contact the PCP office for questions related to their healthcare. Patients top risk factors for readmission: functional physical ability and polypharmacy   Interventions to address risk factors: Scheduled appointment with PCP-11/15/22 and Obtained and reviewed discharge summary and/or continuity of care documents    [address] follow up appointment(s):   Future Appointments   Date Time Provider Kita Kim   2022  1:45 PM [address] [place] [address] MRI 1 [facility]   2023 10:40 AM Michael Tanner MD [facility]     Non-[facility] follow up appointment(s): none at this time    CTN provided contact information for future needs. Plan for follow-up call in 5-7 days based on severity of symptoms and risk factors. Plan for next call:  follow up 3rd call, breathing and oxygen level checks. Goals Addressed                   This Visit's Progress     Prevent complications post hospitalization.    On track     22  Patient to complete antibiotics as ordered. Patient to use cane or rollator at all times for safety. Patient to attend pcp follow up on 11/15/22. CTN to follow up in one week   Ashutosh Jolley RN    11/17/22  Patient to have mri completed. Patient attended pcp appt on 11/15  Patient to push po fluids to ensure 3 to 4 voids per day. Ctn to follow up in one week.    Ashutosh Jolley RN Nutrition related labs WNL (hypophosphatemia being repleted)

## 2023-01-10 ENCOUNTER — APPOINTMENT (OUTPATIENT)
Dept: NEUROLOGY | Facility: CLINIC | Age: 78
End: 2023-01-10

## 2023-01-19 NOTE — ED ADULT NURSE NOTE - NS ED NURSE LEVEL OF CONSCIOUSNESS MENTAL STATUS
Alert/Awake/Cooperative Implemented All Universal Safety Interventions:  [place] to call system. Call bell, personal items and telephone within reach. Instruct patient to call for assistance. Room bathroom lighting operational. Non-slip footwear when patient is off stretcher. Physically safe environment: no spills, clutter or unnecessary equipment. Stretcher in lowest position, wheels locked, appropriate side rails in place.

## 2023-04-07 NOTE — PROGRESS NOTE ADULT - ASSESSMENT
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 Likely infectious, low concern for ischemic, but has since resolved.    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 Likely infectious, low concern for ischemic, but has since resolved.    Recs:  - recommend GI consult for colonoscopy now that symptoms have resolved  - Continue IVF, NPO for now  - if no plans for colonoscopy, may start clear liquids in afternoon  - Continue IV antibiotics  - f/u stool studies  - General Surgery Team 1 to follow Normal

## 2023-08-07 ENCOUNTER — APPOINTMENT (OUTPATIENT)
Dept: NEUROLOGY | Facility: CLINIC | Age: 78
End: 2023-08-07

## 2024-03-25 NOTE — PROCEDURE
Pt denies use of long acting nitrates like, Imdur, Cialis, Levitra and Viagra.   O2 applied via nasal cannula @ 2LPM. Procedure explained and questions answered.     Contrast injected followed by saline flush at 1522  Contrast = 70 ml  0.9 NS flush = 68 ml  Average HR = 54    Patient tolerated the procedure without complication. Denies any contrast reaction. Report to Vane PRYOR      
[FreeTextEntry1] : LE Venous Duplex ordered to r/o SVT, shows: negative DVT bilaterally.

## 2024-06-21 NOTE — ED ADULT NURSE NOTE - NS ED NURSE PATIENT LEFT UNIT TIME
Medication: Yong  Last office visit date: 6/11/24  Medication Refill Protocol Failed.  Failed criteria:  . Sent to clinician to review.     
00:28

## 2024-10-07 NOTE — H&P ADULT - PROBLEM SELECTOR PLAN 3
Recommendations  1. Advance diet as tolerated to Regular.   2. Add Ensure TID.  3. Add ProTgold daily.      Goals: Diet to advance by follow up note.   Nutrition Goal Status: progressing towards goal  Communication of RD Recs: reviewed with RN     Nutrition Diagnosis PES Statement:   Inadequate (suboptimal) protein-energy intake related to catabolism energy increases as evidenced by patient with history of CLL and lung cancer post-op, [place] diet.    Pt had resection of meningioma August 2017 and is on seizure ppx with Keppra 500mg PO daily.

## 2024-10-17 NOTE — ED ADULT TRIAGE NOTE - MEANS OF ARRIVAL
Last Appointment:  9/25/2024  Future Appointments   Date Time Provider Department Center   12/17/2024  9:00 AM GinaebDmitri mancilla MD [facility] DEP       stretcher

## 2024-11-06 NOTE — PROGRESS NOTE ADULT - ASSESSMENT
11/06/2024  Mira Gracia is a 25 y.o., female.    Surgical History    Procedure Laterality Date Comment Source   LAPAROSCOPIC LIGATION OF FALLOPIAN TUBE Bilateral 6/22/2023 Procedure: LIGATION, FALLOPIAN TUBE, LAPAROSCOPIC;  Surgeon: Angelo Allison MD;  Location: [facility];  Service: OB/GYN;  Laterality: Bilateral;      Medical History    Diagnosis Date Comment Source   Anxiety disorder, unspecified      Carpal tunnel syndrome      Depression      History of pre-eclampsia        Pre-op Assessment    I have reviewed the Patient Summary Reports.     I have reviewed the Nursing Notes. I have reviewed the NPO Status.   I have reviewed the Medications.     Review of Systems  Anesthesia Hx:  No problems with previous Anesthesia             Denies Family Hx of Anesthesia complications.    Denies Personal Hx of Anesthesia complications.                    Social:  Smoker   Tobacco Use:  of cigarette    Hematology/Oncology:  Hematology Normal   Oncology Normal                                   EENT/Dental:  EENT/Dental Normal           Cardiovascular:  Cardiovascular Normal Exercise tolerance: poor                                             Pulmonary:  Pulmonary Normal                       Renal/:  Renal/ Normal                 Hepatic/GI:  Hepatic/GI Normal                    Musculoskeletal:  Musculoskeletal Normal                Neurological:    Neuromuscular Disease,                                   Endocrine:  Endocrine Normal          Morbid Obesity / BMI > 40  Dermatological:  Skin Normal    Psych:   anxiety depression  Anxiety Disorder.               Physical Exam  General: Well nourished, Cooperative, Alert and Oriented    Airway:  Mallampati: II / II  Mouth Opening: Normal  TM Distance: Normal  Tongue: Normal  Neck ROM: Normal ROM    Dental:  Intact        Anesthesia Plan  Type of  Anesthesia, risks & benefits discussed:    Anesthesia Type: Gen ETT  Intra-op Monitoring Plan: Standard ASA Monitors  Post Op Pain Control Plan: multimodal analgesia  Induction:  IV  Airway Plan: Direct  Informed Consent: Informed consent signed with the Patient and all parties understand the risks and agree with anesthesia plan.  All questions answered. Patient consented to blood products? Yes  ASA Score: 3  Day of Surgery Review of History & Physical: H&P Update referred to the surgeon/provider.I have interviewed and examined the patient. I have reviewed the patient's H&P dated: There are no significant changes.     Ready For Surgery From Anesthesia Perspective.     .       73F pt with PMH Large bowel obstruction s/p descending loop colostomy s/p ex lap, CONNIE, colostomy take down, sigmoidectomy primary anastomosis 11/9    Regional  LR  No abx  Voids  LRD  SQH  Levetiracetam for previous meningioma resection  ASA 81 every other day  Toradol until 11/15  pain PRN  home today